# Patient Record
Sex: MALE | Race: WHITE | Employment: OTHER | URBAN - METROPOLITAN AREA
[De-identification: names, ages, dates, MRNs, and addresses within clinical notes are randomized per-mention and may not be internally consistent; named-entity substitution may affect disease eponyms.]

---

## 2017-01-12 ENCOUNTER — HOSPITAL ENCOUNTER (INPATIENT)
Age: 62
Setting detail: SURGERY ADMIT
End: 2017-01-12
Attending: ORTHOPAEDIC SURGERY | Admitting: ORTHOPAEDIC SURGERY

## 2017-02-13 ENCOUNTER — HOSPITAL ENCOUNTER (OUTPATIENT)
Dept: PREADMISSION TESTING | Age: 62
Discharge: HOME | End: 2017-02-13
Payer: COMMERCIAL

## 2017-02-13 VITALS
WEIGHT: 250 LBS | SYSTOLIC BLOOD PRESSURE: 129 MMHG | TEMPERATURE: 97.4 F | BODY MASS INDEX: 32.08 KG/M2 | HEART RATE: 70 BPM | DIASTOLIC BLOOD PRESSURE: 71 MMHG | HEIGHT: 74 IN

## 2017-02-13 LAB
ABO + RH BLD: NORMAL
ALBUMIN SERPL BCP-MCNC: 3.1 G/DL (ref 3.5–5)
ALBUMIN/GLOB SERPL: 0.8 {RATIO} (ref 1.1–2.2)
ALP SERPL-CCNC: 138 U/L (ref 45–117)
ALT SERPL-CCNC: 27 U/L (ref 12–78)
ANION GAP BLD CALC-SCNC: 10 MMOL/L (ref 5–15)
APPEARANCE UR: CLEAR
APTT PPP: 34.9 SEC (ref 22.1–32.5)
AST SERPL W P-5'-P-CCNC: 53 U/L (ref 15–37)
ATRIAL RATE: 66 BPM
BACTERIA URNS QL MICRO: NEGATIVE /HPF
BILIRUB SERPL-MCNC: 1 MG/DL (ref 0.2–1)
BILIRUB UR QL: NEGATIVE
BLOOD BANK CMNT PATIENT-IMP: NORMAL
BLOOD GROUP ANTIBODIES SERPL: NORMAL
BUN SERPL-MCNC: 26 MG/DL (ref 6–20)
BUN/CREAT SERPL: 11 (ref 12–20)
CALCIUM SERPL-MCNC: 8.2 MG/DL (ref 8.5–10.1)
CALCULATED P AXIS, ECG09: 29 DEGREES
CALCULATED R AXIS, ECG10: -37 DEGREES
CALCULATED T AXIS, ECG11: 10 DEGREES
CHLORIDE SERPL-SCNC: 107 MMOL/L (ref 97–108)
CO2 SERPL-SCNC: 21 MMOL/L (ref 21–32)
COLOR UR: NORMAL
CREAT SERPL-MCNC: 2.31 MG/DL (ref 0.7–1.3)
DIAGNOSIS, 93000: NORMAL
EPITH CASTS URNS QL MICRO: NORMAL /LPF
GLOBULIN SER CALC-MCNC: 3.9 G/DL (ref 2–4)
GLUCOSE SERPL-MCNC: 132 MG/DL (ref 65–100)
GLUCOSE UR STRIP.AUTO-MCNC: NEGATIVE MG/DL
HGB UR QL STRIP: NEGATIVE
HYALINE CASTS URNS QL MICRO: NORMAL /LPF (ref 0–5)
INR PPP: 1.6 (ref 0.9–1.1)
KETONES UR QL STRIP.AUTO: NEGATIVE MG/DL
LEUKOCYTE ESTERASE UR QL STRIP.AUTO: NEGATIVE
NITRITE UR QL STRIP.AUTO: NEGATIVE
P-R INTERVAL, ECG05: 236 MS
PH UR STRIP: 5 [PH] (ref 5–8)
POTASSIUM SERPL-SCNC: 4.6 MMOL/L (ref 3.5–5.1)
PROT SERPL-MCNC: 7 G/DL (ref 6.4–8.2)
PROT UR STRIP-MCNC: NEGATIVE MG/DL
PROTHROMBIN TIME: 16.8 SEC (ref 9–11.1)
Q-T INTERVAL, ECG07: 464 MS
QRS DURATION, ECG06: 112 MS
QTC CALCULATION (BEZET), ECG08: 486 MS
RBC #/AREA URNS HPF: NORMAL /HPF (ref 0–5)
SODIUM SERPL-SCNC: 138 MMOL/L (ref 136–145)
SP GR UR REFRACTOMETRY: 1.02 (ref 1–1.03)
SPECIMEN EXP DATE BLD: NORMAL
THERAPEUTIC RANGE,PTTT: ABNORMAL SECS (ref 58–77)
UA: UC IF INDICATED,UAUC: NORMAL
UROBILINOGEN UR QL STRIP.AUTO: 1 EU/DL (ref 0.2–1)
VENTRICULAR RATE, ECG03: 66 BPM
WBC URNS QL MICRO: NORMAL /HPF (ref 0–4)

## 2017-02-13 PROCEDURE — 85610 PROTHROMBIN TIME: CPT | Performed by: ORTHOPAEDIC SURGERY

## 2017-02-13 PROCEDURE — 85730 THROMBOPLASTIN TIME PARTIAL: CPT | Performed by: ORTHOPAEDIC SURGERY

## 2017-02-13 PROCEDURE — 86900 BLOOD TYPING SEROLOGIC ABO: CPT | Performed by: NURSE PRACTITIONER

## 2017-02-13 PROCEDURE — 81001 URINALYSIS AUTO W/SCOPE: CPT | Performed by: ORTHOPAEDIC SURGERY

## 2017-02-13 PROCEDURE — 80053 COMPREHEN METABOLIC PANEL: CPT | Performed by: ORTHOPAEDIC SURGERY

## 2017-02-13 PROCEDURE — 93005 ELECTROCARDIOGRAM TRACING: CPT

## 2017-02-13 PROCEDURE — 86870 RBC ANTIBODY IDENTIFICATION: CPT | Performed by: NURSE PRACTITIONER

## 2017-02-13 RX ORDER — METOPROLOL SUCCINATE 25 MG/1
25 TABLET, EXTENDED RELEASE ORAL
COMMUNITY
End: 2017-05-11

## 2017-02-13 NOTE — ADVANCED PRACTICE NURSE
Preoperative instructions reviewed with patient. Patient given 2-6 packs of CHG wipes. Instructions reviewed on use of CHG wipes. Patient given SSI infection FAQS sheet, as well as a  MRSA/MSSA treatment instruction sheet  With an explanation to patient that they will be notified if treatment instructions need to be initiated. Patient was given the opportunity to ask questions on the information provided. NOTICE OF SERVICES FOR DEAF AND HARD OF HEARING  FORM COMPLETED AND PLACED ON CHART.

## 2017-02-14 LAB
BACTERIA SPEC CULT: NORMAL
BACTERIA SPEC CULT: NORMAL
SERVICE CMNT-IMP: NORMAL

## 2017-02-14 RX ORDER — DILTIAZEM HYDROCHLORIDE 180 MG/1
180 CAPSULE, EXTENDED RELEASE ORAL DAILY
COMMUNITY
End: 2017-05-11

## 2017-02-14 RX ORDER — WARFARIN SODIUM 5 MG/1
5 TABLET ORAL DAILY
Status: ON HOLD | COMMUNITY
End: 2017-05-22

## 2017-02-14 NOTE — ADVANCED PRACTICE NURSE
Patient called to inform he needs to return to PAT 2/27 to have additional blood work completed (per Jcarlos from blood bank). Patient states he will return morning of 2/27, will call back to confirm time.

## 2017-02-14 NOTE — ADVANCED PRACTICE NURSE
Faxed PAT testing reports (and fax confirmation received) to Dr. Elizabeth Tapia office. Called at 0930 on 2/14/17(left message on Александр's voice mail) RE: abnormal EKG, BMP, PT/INR, CBC. Sending EKG to anesthesia for review with Nephrology notes and labs.     All labs and EKG faxed to patient PCP for preop exam.

## 2017-02-15 NOTE — ADVANCED PRACTICE NURSE
EKG returned from anesthesia review. OK to proceed without further orders per Dr. Yareli Keene, anesthesiologist. EKG faxed to Dr. Collins Bones office with confirmation of receipt.

## 2017-02-24 ENCOUNTER — HOSPITAL ENCOUNTER (OUTPATIENT)
Dept: INFUSION THERAPY | Age: 62
Discharge: HOME | End: 2017-02-24
Payer: COMMERCIAL

## 2017-02-24 VITALS
DIASTOLIC BLOOD PRESSURE: 61 MMHG | HEART RATE: 66 BPM | TEMPERATURE: 97.8 F | SYSTOLIC BLOOD PRESSURE: 109 MMHG | RESPIRATION RATE: 18 BRPM

## 2017-02-24 PROCEDURE — 86922 COMPATIBILITY TEST ANTIGLOB: CPT | Performed by: INTERNAL MEDICINE

## 2017-02-24 PROCEDURE — 86156 COLD AGGLUTININ SCREEN: CPT

## 2017-02-24 PROCEDURE — 86902 BLOOD TYPE ANTIGEN DONOR EA: CPT | Performed by: INTERNAL MEDICINE

## 2017-02-24 PROCEDURE — 86870 RBC ANTIBODY IDENTIFICATION: CPT | Performed by: INTERNAL MEDICINE

## 2017-02-24 PROCEDURE — 86880 COOMBS TEST DIRECT: CPT | Performed by: INTERNAL MEDICINE

## 2017-02-24 PROCEDURE — 86976 RBC SERUM PRETX ID DILUTION: CPT

## 2017-02-24 PROCEDURE — 86900 BLOOD TYPING SEROLOGIC ABO: CPT | Performed by: INTERNAL MEDICINE

## 2017-02-24 PROCEDURE — 86850 RBC ANTIBODY SCREEN: CPT

## 2017-02-24 PROCEDURE — 86870 RBC ANTIBODY IDENTIFICATION: CPT

## 2017-02-24 PROCEDURE — 86904 BLOOD TYPING PATIENT SERUM: CPT

## 2017-02-24 PROCEDURE — 86880 COOMBS TEST DIRECT: CPT

## 2017-02-24 PROCEDURE — 86920 COMPATIBILITY TEST SPIN: CPT | Performed by: INTERNAL MEDICINE

## 2017-02-24 PROCEDURE — 86921 COMPATIBILITY TEST INCUBATE: CPT | Performed by: INTERNAL MEDICINE

## 2017-02-24 PROCEDURE — 36415 COLL VENOUS BLD VENIPUNCTURE: CPT | Performed by: INTERNAL MEDICINE

## 2017-02-24 PROCEDURE — 86886 COOMBS TEST INDIRECT TITER: CPT

## 2017-02-24 RX ORDER — SODIUM CHLORIDE 9 MG/ML
250 INJECTION, SOLUTION INTRAVENOUS AS NEEDED
Status: DISCONTINUED | OUTPATIENT
Start: 2017-02-24 | End: 2017-02-28 | Stop reason: HOSPADM

## 2017-02-24 RX ORDER — SODIUM CHLORIDE 9 MG/ML
25 INJECTION, SOLUTION INTRAVENOUS CONTINUOUS
Status: DISCONTINUED | OUTPATIENT
Start: 2017-02-27 | End: 2017-03-03 | Stop reason: HOSPADM

## 2017-02-24 RX ORDER — DIPHENHYDRAMINE HCL 25 MG
25 CAPSULE ORAL ONCE
Status: COMPLETED | OUTPATIENT
Start: 2017-02-27 | End: 2017-02-27

## 2017-02-24 RX ORDER — ACETAMINOPHEN 325 MG/1
650 TABLET ORAL ONCE
Status: COMPLETED | OUTPATIENT
Start: 2017-02-27 | End: 2017-02-27

## 2017-02-24 RX ORDER — FUROSEMIDE 10 MG/ML
20 INJECTION INTRAMUSCULAR; INTRAVENOUS ONCE
Status: DISPENSED | OUTPATIENT
Start: 2017-02-27 | End: 2017-02-27

## 2017-02-24 NOTE — PROGRESS NOTES
[facility] short consult note:  65 Pt arrived to [facility] ambulatory and in no distress for Type and Cross. Denies any new complaints. Labs drawn x 3 tubes due to antibodies. /61  Pulse 66  Temp 97.8 °F (36.6 °C)  Resp 18  Medication given:  none  1600 Discharged home ambulatory and in no distress. Tolerated procedure well. Next appointment 2/27 for transfusion. Lab called after pt left requesting 5 total tubes. Lab called back stating that 3 full ones should be enough.

## 2017-02-27 ENCOUNTER — HOSPITAL ENCOUNTER (OUTPATIENT)
Dept: INFUSION THERAPY | Age: 62
Discharge: HOME | End: 2017-02-27
Payer: COMMERCIAL

## 2017-02-27 VITALS
DIASTOLIC BLOOD PRESSURE: 60 MMHG | RESPIRATION RATE: 18 BRPM | TEMPERATURE: 96.7 F | HEART RATE: 64 BPM | SYSTOLIC BLOOD PRESSURE: 115 MMHG

## 2017-02-27 LAB
ALBUMIN SERPL BCP-MCNC: 2.7 G/DL (ref 3.5–5)
ANION GAP BLD CALC-SCNC: 12 MMOL/L (ref 5–15)
BASOPHILS # BLD AUTO: 0.1 K/UL (ref 0–0.1)
BASOPHILS # BLD: 1 % (ref 0–1)
BUN SERPL-MCNC: 19 MG/DL (ref 6–20)
BUN/CREAT SERPL: 10 (ref 12–20)
CALCIUM SERPL-MCNC: 8.1 MG/DL (ref 8.5–10.1)
CHLORIDE SERPL-SCNC: 111 MMOL/L (ref 97–108)
CO2 SERPL-SCNC: 20 MMOL/L (ref 21–32)
CREAT SERPL-MCNC: 1.81 MG/DL (ref 0.7–1.3)
EOSINOPHIL # BLD: 0.1 K/UL (ref 0–0.4)
EOSINOPHIL NFR BLD: 4 % (ref 0–7)
ERYTHROCYTE [DISTWIDTH] IN BLOOD BY AUTOMATED COUNT: 16.5 % (ref 11.5–14.5)
GLUCOSE SERPL-MCNC: 112 MG/DL (ref 65–100)
HCT VFR BLD AUTO: 22 % (ref 36.6–50.3)
HGB BLD-MCNC: 7.6 G/DL (ref 12.1–17)
LYMPHOCYTES # BLD AUTO: 22 % (ref 12–49)
LYMPHOCYTES # BLD: 0.8 K/UL (ref 0.8–3.5)
MCH RBC QN AUTO: 30.5 PG (ref 26–34)
MCHC RBC AUTO-ENTMCNC: 34.5 G/DL (ref 30–36.5)
MCV RBC AUTO: 88.4 FL (ref 80–99)
MONOCYTES # BLD: 0.5 K/UL (ref 0–1)
MONOCYTES NFR BLD AUTO: 14 % (ref 5–13)
NEUTS SEG # BLD: 2.2 K/UL (ref 1.8–8)
NEUTS SEG NFR BLD AUTO: 59 % (ref 32–75)
PHOSPHATE SERPL-MCNC: 3 MG/DL (ref 2.6–4.7)
PLATELET # BLD AUTO: 91 K/UL (ref 150–400)
POTASSIUM SERPL-SCNC: 4 MMOL/L (ref 3.5–5.1)
RBC # BLD AUTO: 2.49 M/UL (ref 4.1–5.7)
SODIUM SERPL-SCNC: 143 MMOL/L (ref 136–145)
WBC # BLD AUTO: 3.8 K/UL (ref 4.1–11.1)

## 2017-02-27 PROCEDURE — 96374 THER/PROPH/DIAG INJ IV PUSH: CPT

## 2017-02-27 PROCEDURE — 74011250636 HC RX REV CODE- 250/636: Performed by: INTERNAL MEDICINE

## 2017-02-27 PROCEDURE — 85025 COMPLETE CBC W/AUTO DIFF WBC: CPT | Performed by: INTERNAL MEDICINE

## 2017-02-27 PROCEDURE — 80069 RENAL FUNCTION PANEL: CPT | Performed by: INTERNAL MEDICINE

## 2017-02-27 PROCEDURE — 96375 TX/PRO/DX INJ NEW DRUG ADDON: CPT

## 2017-02-27 PROCEDURE — 74011250637 HC RX REV CODE- 250/637: Performed by: INTERNAL MEDICINE

## 2017-02-27 PROCEDURE — 77030013169 SET IV BLD ICUM -A

## 2017-02-27 PROCEDURE — 36430 TRANSFUSION BLD/BLD COMPNT: CPT

## 2017-02-27 PROCEDURE — 36415 COLL VENOUS BLD VENIPUNCTURE: CPT | Performed by: INTERNAL MEDICINE

## 2017-02-27 PROCEDURE — P9016 RBC LEUKOCYTES REDUCED: HCPCS | Performed by: INTERNAL MEDICINE

## 2017-02-27 RX ORDER — SODIUM CHLORIDE 0.9 % (FLUSH) 0.9 %
10-40 SYRINGE (ML) INJECTION AS NEEDED
Status: ACTIVE | OUTPATIENT
Start: 2017-02-27 | End: 2017-02-28

## 2017-02-27 RX ORDER — FUROSEMIDE 10 MG/ML
20 INJECTION INTRAMUSCULAR; INTRAVENOUS ONCE
Status: COMPLETED | OUTPATIENT
Start: 2017-02-27 | End: 2017-02-27

## 2017-02-27 RX ORDER — SODIUM CHLORIDE 9 MG/ML
250 INJECTION, SOLUTION INTRAVENOUS AS NEEDED
Status: DISCONTINUED | OUTPATIENT
Start: 2017-02-27 | End: 2017-03-03 | Stop reason: HOSPADM

## 2017-02-27 RX ORDER — SODIUM CHLORIDE 9 MG/ML
250 INJECTION, SOLUTION INTRAVENOUS AS NEEDED
Status: DISPENSED | OUTPATIENT
Start: 2017-02-27 | End: 2017-02-28

## 2017-02-27 RX ADMIN — Medication 10 ML: at 14:50

## 2017-02-27 RX ADMIN — ACETAMINOPHEN 650 MG: 325 TABLET ORAL at 11:24

## 2017-02-27 RX ADMIN — SODIUM CHLORIDE 25 ML/HR: 900 INJECTION, SOLUTION INTRAVENOUS at 12:05

## 2017-02-27 RX ADMIN — DIPHENHYDRAMINE HYDROCHLORIDE 25 MG: 25 CAPSULE ORAL at 11:24

## 2017-02-27 RX ADMIN — FUROSEMIDE 20 MG: 10 INJECTION, SOLUTION INTRAMUSCULAR; INTRAVENOUS at 14:29

## 2017-02-27 NOTE — PROGRESS NOTES
1115 Pt arrived at [facility] ambulatory and in no distress for transfusion of 1 unit PRBCs. Assessment completed, no new complaints voiced. IV established in R forearm without difficulty. Signs/symptoms of adverse blood reaction discussed with pt, voiced understanding. Patient Vitals for the past 12 hrs:   Temp Pulse Resp BP   02/27/17 1448 - 64 18 115/60   02/27/17 1310 96.7 °F (35.9 °C) 64 18 112/61   02/27/17 1240 96.8 °F (36 °C) - 18 114/57   02/27/17 1225 97.3 °F (36.3 °C) 61 18 116/61   02/27/17 1201 96.8 °F (36 °C) 63 18 118/58     Medications received:   NS @ KVO   Tylenol 650 mg po   Benadryl 25 mg IV   1210: 1st unit PRBCs started and infusing without difficulty, will monitor. 1405: 1st unit completed without adverse reaction noted, NS flushing line. Lasix 20mg IVP    Labs obtained: CBC, Renal Panel    Recent Results (from the past 12 hour(s))   CBC WITH AUTOMATED DIFF    Collection Time: 02/27/17  2:42 PM   Result Value Ref Range    WBC 3.8 (L) 4.1 - 11.1 K/uL    RBC 2.49 (L) 4.10 - 5.70 M/uL    HGB 7.6 (L) 12.1 - 17.0 g/dL    HCT 22.0 (L) 36.6 - 50.3 %    MCV 88.4 80.0 - 99.0 FL    MCH 30.5 26.0 - 34.0 PG    MCHC 34.5 30.0 - 36.5 g/dL    RDW 16.5 (H) 11.5 - 14.5 %    PLATELET 91 (L) 544 - 400 K/uL    NEUTROPHILS 59 32 - 75 %    LYMPHOCYTES 22 12 - 49 %    MONOCYTES 14 (H) 5 - 13 %    EOSINOPHILS 4 0 - 7 %    BASOPHILS 1 0 - 1 %    ABS. NEUTROPHILS 2.2 1.8 - 8.0 K/UL    ABS. LYMPHOCYTES 0.8 0.8 - 3.5 K/UL    ABS. MONOCYTES 0.5 0.0 - 1.0 K/UL    ABS. EOSINOPHILS 0.1 0.0 - 0.4 K/UL    ABS.  BASOPHILS 0.1 0.0 - 0.1 K/UL   RENAL FUNCTION PANEL    Collection Time: 02/27/17  2:42 PM   Result Value Ref Range    Sodium 143 136 - 145 mmol/L    Potassium 4.0 3.5 - 5.1 mmol/L    Chloride 111 (H) 97 - 108 mmol/L    CO2 20 (L) 21 - 32 mmol/L    Anion gap 12 5 - 15 mmol/L    Glucose 112 (H) 65 - 100 mg/dL    BUN 19 6 - 20 MG/DL    Creatinine 1.81 (H) 0.70 - 1.30 MG/DL    BUN/Creatinine ratio 10 (L) 12 - 20      GFR est AA 46 (L) >60 ml/min/1.73m2    GFR est non-AA 38 (L) >60 ml/min/1.73m2    Calcium 8.1 (L) 8.5 - 10.1 MG/DL    Phosphorus 3.0 2.6 - 4.7 MG/DL    Albumin 2.7 (L) 3.5 - 5.0 g/dL     Tolerated transfusion well, no adverse reaction noted. D/C instructions reviewed, copy to pt, voiced understanding. Patient declined 1 hour post transfusion observation. D/Cd from [address] and in no distress accompanied by self. No further appointments.

## 2017-02-27 NOTE — DISCHARGE INSTRUCTIONS
OUTPATIENT INFUSION CENTER    DISCHARGE INSTRUCTIONS FOR:  BLOOD TRANSFUSION    We hope you are feeling better after your blood transfusion. Some mild tenderness or slight bruising at your IV site is normal.  Avoid lifting or heavy use of that extremity for the rest of the day. Drink plenty of fluids, eat a normal diet and get some rest.    There are some important signs that you need to watch for in case you experience a delayed reaction to the blood you have received. Call your physician immediately if you develop any of the following symptoms:    1. Severe headache or backache;    2. Fever above 100 degrees;    3. Chills;    4. Difficulty breathing;    5.  Blood or red color in urine;    6. The feeling of weakness or constant fatigue;    7. Yellowing of the whites of your eyes or skin (jaundice). If your physician is not available, call or go to the nearest emergency room, or dial 911. Paty Dussraine Whaley III, Signature: ___________________________ 2/27/2017  Cindy Machado RN OUTPATIENT INFUSION CENTER    DISCHARGE INSTRUCTIONS FOR:  BLOOD TRANSFUSION    We hope you are feeling better after your blood transfusion. Some mild tenderness or slight bruising at your IV site is normal.  Avoid lifting or heavy use of that extremity for the rest of the day. Drink plenty of fluids, eat a normal diet and get some rest.    There are some important signs that you need to watch for in case you experience a delayed reaction to the blood you have received. Call your physician immediately if you develop any of the following symptoms:    1. Severe headache or backache;    2. Fever above 100 degrees;    3. Chills;    4. Difficulty breathing;    5.  Blood or red color in urine;    6. The feeling of weakness or constant fatigue;    7. Yellowing of the whites of your eyes or skin (jaundice). If your physician is not available, call or go to the nearest emergency room, or dial 911.     Kofi Nguyen III, Signature: ___________________________ 2/27/2017

## 2017-02-28 LAB
ABO + RH BLD: NORMAL
ANTI-COMPLEMENT (C3B,C3D): NORMAL
ANTIGENS PRESENT RBC DONR: NORMAL
BLD PROD TYP BPU: NORMAL
BLOOD BANK CMNT PATIENT-IMP: NORMAL
BLOOD GROUP ANTIBODIES SERPL: NORMAL
BLOOD GROUP ANTIBODIES SERPL: NORMAL
BPU ID: NORMAL
CROSSMATCH RESULT,%XM: NORMAL
DAT IGG-SP REAG RBC QL: NORMAL
DAT POLY-SP REAG RBC QL: NORMAL
PHYSICIAN INSTRUCTIO,%PI: NORMAL
SPECIMEN EXP DATE BLD: NORMAL
STATUS OF UNIT,%ST: NORMAL
UNIT DIVISION, %UDIV: 0

## 2017-03-03 ENCOUNTER — HOSPITAL ENCOUNTER (OUTPATIENT)
Dept: INFUSION THERAPY | Age: 62
Discharge: HOME | End: 2017-03-03
Payer: COMMERCIAL

## 2017-03-03 VITALS
TEMPERATURE: 97.1 F | SYSTOLIC BLOOD PRESSURE: 121 MMHG | HEART RATE: 69 BPM | RESPIRATION RATE: 18 BRPM | DIASTOLIC BLOOD PRESSURE: 63 MMHG | OXYGEN SATURATION: 99 %

## 2017-03-03 LAB
ALBUMIN SERPL BCP-MCNC: 2.7 G/DL (ref 3.5–5)
ANION GAP BLD CALC-SCNC: 10 MMOL/L (ref 5–15)
BUN SERPL-MCNC: 18 MG/DL (ref 6–20)
BUN/CREAT SERPL: 10 (ref 12–20)
CALCIUM SERPL-MCNC: 8.3 MG/DL (ref 8.5–10.1)
CALCIUM SERPL-MCNC: 8.4 MG/DL (ref 8.5–10.1)
CHLORIDE SERPL-SCNC: 106 MMOL/L (ref 97–108)
CO2 SERPL-SCNC: 21 MMOL/L (ref 21–32)
CREAT SERPL-MCNC: 1.78 MG/DL (ref 0.7–1.3)
FERRITIN SERPL-MCNC: 37 NG/ML (ref 26–388)
GLUCOSE SERPL-MCNC: 233 MG/DL (ref 65–100)
HCT VFR BLD AUTO: 25.3 % (ref 36.6–50.3)
HGB BLD-MCNC: 8.1 G/DL (ref 12.1–17)
IRON SATN MFR SERPL: 20 % (ref 20–50)
IRON SERPL-MCNC: 70 UG/DL (ref 35–150)
PHOSPHATE SERPL-MCNC: 2.6 MG/DL (ref 2.6–4.7)
POTASSIUM SERPL-SCNC: 3.4 MMOL/L (ref 3.5–5.1)
PTH-INTACT SERPL-MCNC: 71 PG/ML (ref 14–72)
SODIUM SERPL-SCNC: 137 MMOL/L (ref 136–145)
TIBC SERPL-MCNC: 352 UG/DL (ref 250–450)

## 2017-03-03 PROCEDURE — 36415 COLL VENOUS BLD VENIPUNCTURE: CPT | Performed by: INTERNAL MEDICINE

## 2017-03-03 PROCEDURE — 85018 HEMOGLOBIN: CPT | Performed by: INTERNAL MEDICINE

## 2017-03-03 PROCEDURE — 83970 ASSAY OF PARATHORMONE: CPT | Performed by: INTERNAL MEDICINE

## 2017-03-03 PROCEDURE — 82728 ASSAY OF FERRITIN: CPT | Performed by: INTERNAL MEDICINE

## 2017-03-03 PROCEDURE — 83540 ASSAY OF IRON: CPT | Performed by: INTERNAL MEDICINE

## 2017-03-03 PROCEDURE — 96372 THER/PROPH/DIAG INJ SC/IM: CPT

## 2017-03-03 PROCEDURE — 80069 RENAL FUNCTION PANEL: CPT | Performed by: INTERNAL MEDICINE

## 2017-03-03 PROCEDURE — 74011250636 HC RX REV CODE- 250/636: Performed by: INTERNAL MEDICINE

## 2017-03-03 RX ADMIN — ERYTHROPOIETIN 20000 UNITS: 20000 INJECTION, SOLUTION INTRAVENOUS; SUBCUTANEOUS at 17:10

## 2017-03-03 NOTE — PROGRESS NOTES
Outpatient Infusion Center Short Visit Progress Note    1640 Pt admit to [facility] for Procrit ambulatory with walker in stable condition. Assessment completed. No new concerns voiced. Labs drawn peripherally per order and send for processing. Please review pending lab results in [address] [place] [address]. Visit Vitals    /63    Pulse 69    Temp 97.1 °F (36.2 °C)    Resp 18    SpO2 99%       Recent Results (from the past 12 hour(s))   HGB & HCT    Collection Time: 03/03/17  4:43 PM   Result Value Ref Range    HGB 8.1 (L) 12.1 - 17.0 g/dL    HCT 25.3 (L) 36.6 - 50.3 %   RENAL FUNCTION PANEL    Collection Time: 03/03/17  4:43 PM   Result Value Ref Range    Sodium 137 136 - 145 mmol/L    Potassium 3.4 (L) 3.5 - 5.1 mmol/L    Chloride 106 97 - 108 mmol/L    CO2 21 21 - 32 mmol/L    Anion gap 10 5 - 15 mmol/L    Glucose 233 (H) 65 - 100 mg/dL    BUN 18 6 - 20 MG/DL    Creatinine 1.78 (H) 0.70 - 1.30 MG/DL    BUN/Creatinine ratio 10 (L) 12 - 20      GFR est AA 47 (L) >60 ml/min/1.73m2    GFR est non-AA 39 (L) >60 ml/min/1.73m2    Calcium 8.4 (L) 8.5 - 10.1 MG/DL    Phosphorus 2.6 2.6 - 4.7 MG/DL    Albumin 2.7 (L) 3.5 - 5.0 g/dL   FERRITIN    Collection Time: 03/03/17  4:43 PM   Result Value Ref Range    Ferritin 37 26 - 388 NG/ML   IRON PROFILE    Collection Time: 03/03/17  4:43 PM   Result Value Ref Range    Iron 70 35 - 150 ug/dL    TIBC 352 250 - 450 ug/dL    Iron % saturation 20 20 - 50 %   PTH INTACT    Collection Time: 03/03/17  4:43 PM   Result Value Ref Range    Calcium 8.3 (L) 8.5 - 10.1 MG/DL    PTH, Intact 71.0 14.0 - 72.0 pg/mL         Medications:  Procrit SQ Right Arm    1715 Pt tolerated treatment well. D/c home ambulatory with walker in no distress. Pt aware of next appointment scheduled for 3/9/17.

## 2017-03-06 ENCOUNTER — APPOINTMENT (OUTPATIENT)
Dept: INFUSION THERAPY | Age: 62
End: 2017-03-06
Payer: COMMERCIAL

## 2017-03-09 ENCOUNTER — HOSPITAL ENCOUNTER (OUTPATIENT)
Dept: ULTRASOUND IMAGING | Age: 62
Discharge: HOME | End: 2017-03-09
Attending: INTERNAL MEDICINE
Payer: COMMERCIAL

## 2017-03-09 ENCOUNTER — HOSPITAL ENCOUNTER (OUTPATIENT)
Dept: INFUSION THERAPY | Age: 62
Discharge: HOME | End: 2017-03-09
Payer: COMMERCIAL

## 2017-03-09 VITALS
DIASTOLIC BLOOD PRESSURE: 62 MMHG | TEMPERATURE: 97.2 F | SYSTOLIC BLOOD PRESSURE: 120 MMHG | OXYGEN SATURATION: 99 % | HEART RATE: 72 BPM | RESPIRATION RATE: 18 BRPM

## 2017-03-09 DIAGNOSIS — N18.30 CHRONIC KIDNEY DISEASE, STAGE III (MODERATE) (HCC): ICD-10-CM

## 2017-03-09 PROCEDURE — 36415 COLL VENOUS BLD VENIPUNCTURE: CPT | Performed by: INTERNAL MEDICINE

## 2017-03-09 PROCEDURE — 86880 COOMBS TEST DIRECT: CPT | Performed by: INTERNAL MEDICINE

## 2017-03-09 PROCEDURE — 86870 RBC ANTIBODY IDENTIFICATION: CPT

## 2017-03-09 PROCEDURE — 86860 RBC ANTIBODY ELUTION: CPT

## 2017-03-09 PROCEDURE — 86920 COMPATIBILITY TEST SPIN: CPT | Performed by: INTERNAL MEDICINE

## 2017-03-09 PROCEDURE — 86885 COOMBS TEST INDIRECT QUAL: CPT

## 2017-03-09 PROCEDURE — 86904 BLOOD TYPING PATIENT SERUM: CPT

## 2017-03-09 PROCEDURE — 86850 RBC ANTIBODY SCREEN: CPT | Performed by: INTERNAL MEDICINE

## 2017-03-09 PROCEDURE — 76770 US EXAM ABDO BACK WALL COMP: CPT

## 2017-03-09 PROCEDURE — 86880 COOMBS TEST DIRECT: CPT

## 2017-03-09 PROCEDURE — 86860 RBC ANTIBODY ELUTION: CPT | Performed by: INTERNAL MEDICINE

## 2017-03-09 PROCEDURE — 86870 RBC ANTIBODY IDENTIFICATION: CPT | Performed by: INTERNAL MEDICINE

## 2017-03-09 PROCEDURE — 86850 RBC ANTIBODY SCREEN: CPT

## 2017-03-09 RX ORDER — DIPHENHYDRAMINE HCL 25 MG
25 CAPSULE ORAL ONCE
Status: DISCONTINUED | OUTPATIENT
Start: 2017-03-10 | End: 2017-03-09

## 2017-03-09 RX ORDER — DIPHENHYDRAMINE HCL 25 MG
25 CAPSULE ORAL ONCE
Status: COMPLETED | OUTPATIENT
Start: 2017-03-10 | End: 2017-03-10

## 2017-03-09 RX ORDER — FUROSEMIDE 10 MG/ML
20 INJECTION INTRAMUSCULAR; INTRAVENOUS ONCE
Status: COMPLETED | OUTPATIENT
Start: 2017-03-10 | End: 2017-03-10

## 2017-03-09 RX ORDER — SODIUM CHLORIDE 9 MG/ML
25 INJECTION, SOLUTION INTRAVENOUS CONTINUOUS
Status: DISPENSED | OUTPATIENT
Start: 2017-03-10 | End: 2017-03-10

## 2017-03-09 RX ORDER — SODIUM CHLORIDE 9 MG/ML
25 INJECTION, SOLUTION INTRAVENOUS CONTINUOUS
Status: DISCONTINUED | OUTPATIENT
Start: 2017-03-10 | End: 2017-03-09

## 2017-03-09 RX ORDER — ACETAMINOPHEN 325 MG/1
650 TABLET ORAL ONCE
Status: DISCONTINUED | OUTPATIENT
Start: 2017-03-10 | End: 2017-03-09

## 2017-03-09 RX ORDER — ACETAMINOPHEN 325 MG/1
650 TABLET ORAL ONCE
Status: COMPLETED | OUTPATIENT
Start: 2017-03-10 | End: 2017-03-10

## 2017-03-09 RX ORDER — FUROSEMIDE 10 MG/ML
20 INJECTION INTRAMUSCULAR; INTRAVENOUS ONCE
Status: DISCONTINUED | OUTPATIENT
Start: 2017-03-10 | End: 2017-03-09

## 2017-03-09 NOTE — PROGRESS NOTES
[facility]    1430 Patient arrived ambulatory for type and cross without any acute problems, labs drawn peripherally by Dylan Payan phlebotomist      Patient discharged ambulatory without incident. Patient is aware aware of next Health system appointment on 3/9/2017.      Vital Signs:   Patient Vitals for the past 12 hrs:   Temp Pulse Resp BP SpO2   03/09/17 1434 97.2 °F (36.2 °C) 72 18 120/62 99 %       Lab Results: See connect care

## 2017-03-10 ENCOUNTER — HOSPITAL ENCOUNTER (OUTPATIENT)
Dept: INFUSION THERAPY | Age: 62
Discharge: HOME | End: 2017-03-10
Payer: COMMERCIAL

## 2017-03-10 VITALS
DIASTOLIC BLOOD PRESSURE: 61 MMHG | OXYGEN SATURATION: 98 % | SYSTOLIC BLOOD PRESSURE: 126 MMHG | RESPIRATION RATE: 20 BRPM | TEMPERATURE: 98 F | HEART RATE: 73 BPM

## 2017-03-10 LAB
ALBUMIN SERPL BCP-MCNC: 2.7 G/DL (ref 3.5–5)
ANION GAP BLD CALC-SCNC: 9 MMOL/L (ref 5–15)
BUN SERPL-MCNC: 20 MG/DL (ref 6–20)
BUN/CREAT SERPL: 11 (ref 12–20)
CALCIUM SERPL-MCNC: 8.2 MG/DL (ref 8.5–10.1)
CHLORIDE SERPL-SCNC: 108 MMOL/L (ref 97–108)
CO2 SERPL-SCNC: 25 MMOL/L (ref 21–32)
CREAT SERPL-MCNC: 1.88 MG/DL (ref 0.7–1.3)
ERYTHROCYTE [DISTWIDTH] IN BLOOD BY AUTOMATED COUNT: 15.6 % (ref 11.5–14.5)
GLUCOSE SERPL-MCNC: 112 MG/DL (ref 65–100)
HCT VFR BLD AUTO: 24.3 % (ref 36.6–50.3)
HGB BLD-MCNC: 7.8 G/DL (ref 12.1–17)
MCH RBC QN AUTO: 28.5 PG (ref 26–34)
MCHC RBC AUTO-ENTMCNC: 32.1 G/DL (ref 30–36.5)
MCV RBC AUTO: 88.7 FL (ref 80–99)
PHOSPHATE SERPL-MCNC: 3.2 MG/DL (ref 2.6–4.7)
PLATELET # BLD AUTO: 103 K/UL (ref 150–400)
POTASSIUM SERPL-SCNC: 3.5 MMOL/L (ref 3.5–5.1)
RBC # BLD AUTO: 2.74 M/UL (ref 4.1–5.7)
SODIUM SERPL-SCNC: 142 MMOL/L (ref 136–145)
WBC # BLD AUTO: 3.9 K/UL (ref 4.1–11.1)

## 2017-03-10 PROCEDURE — 86921 COMPATIBILITY TEST INCUBATE: CPT | Performed by: INTERNAL MEDICINE

## 2017-03-10 PROCEDURE — 85027 COMPLETE CBC AUTOMATED: CPT | Performed by: INTERNAL MEDICINE

## 2017-03-10 PROCEDURE — P9016 RBC LEUKOCYTES REDUCED: HCPCS | Performed by: INTERNAL MEDICINE

## 2017-03-10 PROCEDURE — 86922 COMPATIBILITY TEST ANTIGLOB: CPT | Performed by: INTERNAL MEDICINE

## 2017-03-10 PROCEDURE — 96372 THER/PROPH/DIAG INJ SC/IM: CPT

## 2017-03-10 PROCEDURE — 96374 THER/PROPH/DIAG INJ IV PUSH: CPT

## 2017-03-10 PROCEDURE — 36415 COLL VENOUS BLD VENIPUNCTURE: CPT | Performed by: INTERNAL MEDICINE

## 2017-03-10 PROCEDURE — 36430 TRANSFUSION BLD/BLD COMPNT: CPT

## 2017-03-10 PROCEDURE — 74011250636 HC RX REV CODE- 250/636: Performed by: INTERNAL MEDICINE

## 2017-03-10 PROCEDURE — 80069 RENAL FUNCTION PANEL: CPT | Performed by: INTERNAL MEDICINE

## 2017-03-10 PROCEDURE — 74011250637 HC RX REV CODE- 250/637: Performed by: INTERNAL MEDICINE

## 2017-03-10 RX ORDER — SODIUM CHLORIDE 0.9 % (FLUSH) 0.9 %
5-10 SYRINGE (ML) INJECTION EVERY 8 HOURS
Status: DISCONTINUED | OUTPATIENT
Start: 2017-03-10 | End: 2017-03-14 | Stop reason: HOSPADM

## 2017-03-10 RX ORDER — SODIUM CHLORIDE 9 MG/ML
250 INJECTION, SOLUTION INTRAVENOUS AS NEEDED
Status: DISCONTINUED | OUTPATIENT
Start: 2017-03-10 | End: 2017-03-14 | Stop reason: HOSPADM

## 2017-03-10 RX ORDER — HEPARIN 100 UNIT/ML
500 SYRINGE INTRAVENOUS AS NEEDED
Status: ACTIVE | OUTPATIENT
Start: 2017-03-10 | End: 2017-03-11

## 2017-03-10 RX ADMIN — SODIUM CHLORIDE 25 ML/HR: 900 INJECTION, SOLUTION INTRAVENOUS at 10:28

## 2017-03-10 RX ADMIN — ACETAMINOPHEN 650 MG: 325 TABLET ORAL at 10:17

## 2017-03-10 RX ADMIN — ERYTHROPOIETIN 20000 UNITS: 20000 INJECTION, SOLUTION INTRAVENOUS; SUBCUTANEOUS at 14:11

## 2017-03-10 RX ADMIN — FUROSEMIDE 20 MG: 10 INJECTION, SOLUTION INTRAVENOUS at 13:18

## 2017-03-10 RX ADMIN — DIPHENHYDRAMINE HYDROCHLORIDE 25 MG: 25 CAPSULE ORAL at 10:17

## 2017-03-10 NOTE — DISCHARGE INSTRUCTIONS
OUTPATIENT INFUSION CENTER    DISCHARGE INSTRUCTIONS FOR:  BLOOD TRANSFUSION    We hope you are feeling better after your blood transfusion. Some mild tenderness or slight bruising at your IV site is normal.  Avoid lifting or heavy use of that extremity for the rest of the day. Drink plenty of fluids, eat a normal diet and get some rest.    There are some important signs that you need to watch for in case you experience a delayed reaction to the blood you have received. Call your physician immediately if you develop any of the following symptoms:    1. Severe headache or backache;    2. Fever above 100 degrees;    3. Chills;    4. Difficulty breathing;    5.  Blood or red color in urine;    6. The feeling of weakness or constant fatigue;    7. Yellowing of the whites of your eyes or skin (jaundice). If your physician is not available, call or go to the nearest emergency room, or dial 911.     Maurene Oppenheim III, Signature: ___________________________ 3/10/2017  Ricarda Robbins RN

## 2017-03-10 NOTE — PROGRESS NOTES
1000: Pt arrived at [facility] ambulatory and in no distress for transfusion of 1 unit PRBCs and week 2 of 8 Procrit injections. Assessment completed, no new complaints voiced. IV established in RAC without difficulty. Signs/symptoms of adverse blood reaction discussed with pt, voiced understanding. Medications received:  NS @ KVO  Tylenol 650 mg po   Benadryl 25 mg IV  Lasix 20 mg IV  Procrit 20,000 units    1100:  1st unit PRBCs started and infusing without difficulty, will monitor. 1305:  1st unit completed without adverse reaction noted, NS flushing line. Recent Results (from the past 24 hour(s))   CBC W/O DIFF    Collection Time: 03/10/17  1:47 PM   Result Value Ref Range    WBC 3.9 (L) 4.1 - 11.1 K/uL    RBC 2.74 (L) 4.10 - 5.70 M/uL    HGB 7.8 (L) 12.1 - 17.0 g/dL    HCT 24.3 (L) 36.6 - 50.3 %    MCV 88.7 80.0 - 99.0 FL    MCH 28.5 26.0 - 34.0 PG    MCHC 32.1 30.0 - 36.5 g/dL    RDW 15.6 (H) 11.5 - 14.5 %    PLATELET 161 (L) 225 - 400 K/uL   RENAL FUNCTION PANEL    Collection Time: 03/10/17  1:47 PM   Result Value Ref Range    Sodium 142 136 - 145 mmol/L    Potassium 3.5 3.5 - 5.1 mmol/L    Chloride 108 97 - 108 mmol/L    CO2 25 21 - 32 mmol/L    Anion gap 9 5 - 15 mmol/L    Glucose 112 (H) 65 - 100 mg/dL    BUN 20 6 - 20 MG/DL    Creatinine 1.88 (H) 0.70 - 1.30 MG/DL    BUN/Creatinine ratio 11 (L) 12 - 20      GFR est AA 44 (L) >60 ml/min/1.73m2    GFR est non-AA 36 (L) >60 ml/min/1.73m2    Calcium 8.2 (L) 8.5 - 10.1 MG/DL    Phosphorus 3.2 2.6 - 4.7 MG/DL    Albumin 2.7 (L) 3.5 - 5.0 g/dL     1420:  hgb noted to be 7.8 post transfusion, notified MD office, spoke with Jose Melton, no orders obtained at this time. 1430: Tolerated transfusion  well, no adverse reaction noted. D/C instructions reviewed, copy to pt, voiced understanding. D/Cd from [facility] ambulatory and in no distress accompanied by spouse. Next appt 3/17.

## 2017-03-11 LAB
ABO + RH BLD: NORMAL
ANTI-COMPLEMENT (C3B,C3D): NORMAL
BLD GP AB SCN SERPL QL ELUTION: NORMAL
BLD PROD TYP BPU: NORMAL
BLOOD BANK CMNT PATIENT-IMP: NORMAL
BLOOD GROUP ANTIBODIES SERPL: NORMAL
BLOOD GROUP ANTIBODIES SERPL: NORMAL
BPU ID: NORMAL
CROSSMATCH RESULT,%XM: NORMAL
DAT IGG-SP REAG RBC QL: NORMAL
DAT POLY-SP REAG RBC QL: NORMAL
SPECIMEN EXP DATE BLD: NORMAL
STATUS OF UNIT,%ST: NORMAL
UNIT DIVISION, %UDIV: 0

## 2017-03-17 ENCOUNTER — HOSPITAL ENCOUNTER (OUTPATIENT)
Dept: INFUSION THERAPY | Age: 62
Discharge: HOME | End: 2017-03-17
Payer: COMMERCIAL

## 2017-03-17 VITALS
HEART RATE: 64 BPM | SYSTOLIC BLOOD PRESSURE: 120 MMHG | RESPIRATION RATE: 20 BRPM | DIASTOLIC BLOOD PRESSURE: 61 MMHG | TEMPERATURE: 96.8 F

## 2017-03-17 LAB
ALBUMIN SERPL BCP-MCNC: 2.9 G/DL (ref 3.5–5)
ANION GAP BLD CALC-SCNC: 11 MMOL/L (ref 5–15)
BUN SERPL-MCNC: 22 MG/DL (ref 6–20)
BUN/CREAT SERPL: 12 (ref 12–20)
CALCIUM SERPL-MCNC: 8.7 MG/DL (ref 8.5–10.1)
CHLORIDE SERPL-SCNC: 105 MMOL/L (ref 97–108)
CO2 SERPL-SCNC: 22 MMOL/L (ref 21–32)
CREAT SERPL-MCNC: 1.81 MG/DL (ref 0.7–1.3)
GLUCOSE SERPL-MCNC: 121 MG/DL (ref 65–100)
HCT VFR BLD AUTO: 27.1 % (ref 36.6–50.3)
HGB BLD-MCNC: 8.7 G/DL (ref 12.1–17)
PHOSPHATE SERPL-MCNC: 2.8 MG/DL (ref 2.6–4.7)
POTASSIUM SERPL-SCNC: 3.3 MMOL/L (ref 3.5–5.1)
SODIUM SERPL-SCNC: 138 MMOL/L (ref 136–145)

## 2017-03-17 PROCEDURE — 80069 RENAL FUNCTION PANEL: CPT | Performed by: INTERNAL MEDICINE

## 2017-03-17 PROCEDURE — 85018 HEMOGLOBIN: CPT | Performed by: INTERNAL MEDICINE

## 2017-03-17 PROCEDURE — 36415 COLL VENOUS BLD VENIPUNCTURE: CPT | Performed by: INTERNAL MEDICINE

## 2017-03-17 PROCEDURE — 96372 THER/PROPH/DIAG INJ SC/IM: CPT

## 2017-03-17 PROCEDURE — 74011250636 HC RX REV CODE- 250/636: Performed by: INTERNAL MEDICINE

## 2017-03-17 RX ADMIN — ERYTHROPOIETIN 40000 UNITS: 40000 INJECTION, SOLUTION INTRAVENOUS; SUBCUTANEOUS at 16:42

## 2017-03-17 NOTE — PROGRESS NOTES
Outpatient Infusion Center Short Visit Progress Note    1600 Pt admit to [facility] for labs and possible Procrit injection ambulatory in stable condition. Assessment completed. No new concerns voiced. Visit Vitals    /61 (BP 1 Location: Right arm, BP Patient Position: Sitting)    Pulse 64    Temp 96.8 °F (36 °C)    Resp 20       Labs drawn peripherally and sent for processing. Medications:  Procrit SQ    1645 Pt tolerated treatment well. D/c home ambulatory in no distress. Pt aware of next appointment scheduled for 3/24/17 at 4:30 for weekly labs and possible Procrit injection.     Recent Results (from the past 12 hour(s))   HGB & HCT    Collection Time: 03/17/17  4:06 PM   Result Value Ref Range    HGB 8.7 (L) 12.1 - 17.0 g/dL    HCT 27.1 (L) 36.6 - 50.3 %   RENAL FUNCTION PANEL    Collection Time: 03/17/17  4:06 PM   Result Value Ref Range    Sodium 138 136 - 145 mmol/L    Potassium 3.3 (L) 3.5 - 5.1 mmol/L    Chloride 105 97 - 108 mmol/L    CO2 22 21 - 32 mmol/L    Anion gap 11 5 - 15 mmol/L    Glucose 121 (H) 65 - 100 mg/dL    BUN 22 (H) 6 - 20 MG/DL    Creatinine 1.81 (H) 0.70 - 1.30 MG/DL    BUN/Creatinine ratio 12 12 - 20      GFR est AA 46 (L) >60 ml/min/1.73m2    GFR est non-AA 38 (L) >60 ml/min/1.73m2    Calcium 8.7 8.5 - 10.1 MG/DL    Phosphorus 2.8 2.6 - 4.7 MG/DL    Albumin 2.9 (L) 3.5 - 5.0 g/dL

## 2017-03-23 ENCOUNTER — HOSPITAL ENCOUNTER (OUTPATIENT)
Dept: INFUSION THERAPY | Age: 62
Discharge: HOME | End: 2017-03-23
Payer: COMMERCIAL

## 2017-03-23 VITALS
HEART RATE: 63 BPM | DIASTOLIC BLOOD PRESSURE: 54 MMHG | OXYGEN SATURATION: 97 % | SYSTOLIC BLOOD PRESSURE: 113 MMHG | RESPIRATION RATE: 20 BRPM | TEMPERATURE: 97.8 F

## 2017-03-23 LAB
ALBUMIN SERPL BCP-MCNC: 2.8 G/DL (ref 3.5–5)
ANION GAP BLD CALC-SCNC: 7 MMOL/L (ref 5–15)
BUN SERPL-MCNC: 22 MG/DL (ref 6–20)
BUN/CREAT SERPL: 11 (ref 12–20)
CALCIUM SERPL-MCNC: 8.6 MG/DL (ref 8.5–10.1)
CHLORIDE SERPL-SCNC: 109 MMOL/L (ref 97–108)
CO2 SERPL-SCNC: 25 MMOL/L (ref 21–32)
CREAT SERPL-MCNC: 1.96 MG/DL (ref 0.7–1.3)
GLUCOSE SERPL-MCNC: 172 MG/DL (ref 65–100)
HCT VFR BLD AUTO: 28.3 % (ref 36.6–50.3)
HGB BLD-MCNC: 8.9 G/DL (ref 12.1–17)
PHOSPHATE SERPL-MCNC: 2.8 MG/DL (ref 2.6–4.7)
POTASSIUM SERPL-SCNC: 3.5 MMOL/L (ref 3.5–5.1)
SODIUM SERPL-SCNC: 141 MMOL/L (ref 136–145)

## 2017-03-23 PROCEDURE — 85018 HEMOGLOBIN: CPT | Performed by: INTERNAL MEDICINE

## 2017-03-23 PROCEDURE — 74011250636 HC RX REV CODE- 250/636: Performed by: INTERNAL MEDICINE

## 2017-03-23 PROCEDURE — 36415 COLL VENOUS BLD VENIPUNCTURE: CPT | Performed by: INTERNAL MEDICINE

## 2017-03-23 PROCEDURE — 96372 THER/PROPH/DIAG INJ SC/IM: CPT

## 2017-03-23 PROCEDURE — 80069 RENAL FUNCTION PANEL: CPT | Performed by: INTERNAL MEDICINE

## 2017-03-23 RX ADMIN — ERYTHROPOIETIN 40000 UNITS: 40000 INJECTION, SOLUTION INTRAVENOUS; SUBCUTANEOUS at 17:06

## 2017-03-23 NOTE — PROGRESS NOTES
26 Pt admit to [facility] for labs, possible Procrit ambulatory with walker in stable condition. Assessment completed. No new concerns voiced. Labs drawn peripherally and sent. Not all labs resulted at time of note. Visit Vitals    /54 (BP 1 Location: Right arm, BP Patient Position: Sitting)    Pulse 63    Temp 97.8 °F (36.6 °C)    Resp 20    SpO2 97%       Medications:  Procrit 40,000 Units SQ in right upper arm    1515 Pt tolerated treatment well. D/c home ambulatory in no distress. Pt aware of next appointment scheduled for 3/31/17.     Recent Results (from the past 12 hour(s))   HGB & HCT    Collection Time: 03/23/17  4:42 PM   Result Value Ref Range    HGB 8.9 (L) 12.1 - 17.0 g/dL    HCT 28.3 (L) 36.6 - 50.3 %

## 2017-03-24 ENCOUNTER — HOSPITAL ENCOUNTER (OUTPATIENT)
Dept: INFUSION THERAPY | Age: 62
End: 2017-03-24
Payer: COMMERCIAL

## 2017-03-31 ENCOUNTER — HOSPITAL ENCOUNTER (OUTPATIENT)
Dept: INFUSION THERAPY | Age: 62
Discharge: HOME | End: 2017-03-31
Payer: COMMERCIAL

## 2017-03-31 VITALS
DIASTOLIC BLOOD PRESSURE: 65 MMHG | TEMPERATURE: 97.3 F | OXYGEN SATURATION: 97 % | RESPIRATION RATE: 18 BRPM | HEART RATE: 67 BPM | SYSTOLIC BLOOD PRESSURE: 119 MMHG

## 2017-03-31 LAB
ALBUMIN SERPL BCP-MCNC: 2.8 G/DL (ref 3.5–5)
ANION GAP BLD CALC-SCNC: 11 MMOL/L (ref 5–15)
BUN SERPL-MCNC: 23 MG/DL (ref 6–20)
BUN/CREAT SERPL: 13 (ref 12–20)
CALCIUM SERPL-MCNC: 8.5 MG/DL (ref 8.5–10.1)
CHLORIDE SERPL-SCNC: 106 MMOL/L (ref 97–108)
CO2 SERPL-SCNC: 22 MMOL/L (ref 21–32)
CREAT SERPL-MCNC: 1.71 MG/DL (ref 0.7–1.3)
GLUCOSE SERPL-MCNC: 198 MG/DL (ref 65–100)
HCT VFR BLD AUTO: 27.9 % (ref 36.6–50.3)
HGB BLD-MCNC: 8.8 G/DL (ref 12.1–17)
PHOSPHATE SERPL-MCNC: 2.8 MG/DL (ref 2.6–4.7)
POTASSIUM SERPL-SCNC: 3.7 MMOL/L (ref 3.5–5.1)
SODIUM SERPL-SCNC: 139 MMOL/L (ref 136–145)

## 2017-03-31 PROCEDURE — 96365 THER/PROPH/DIAG IV INF INIT: CPT

## 2017-03-31 PROCEDURE — 36415 COLL VENOUS BLD VENIPUNCTURE: CPT | Performed by: INTERNAL MEDICINE

## 2017-03-31 PROCEDURE — 96374 THER/PROPH/DIAG INJ IV PUSH: CPT

## 2017-03-31 PROCEDURE — 74011250636 HC RX REV CODE- 250/636: Performed by: INTERNAL MEDICINE

## 2017-03-31 PROCEDURE — 80069 RENAL FUNCTION PANEL: CPT | Performed by: INTERNAL MEDICINE

## 2017-03-31 PROCEDURE — 96372 THER/PROPH/DIAG INJ SC/IM: CPT

## 2017-03-31 PROCEDURE — 85018 HEMOGLOBIN: CPT | Performed by: INTERNAL MEDICINE

## 2017-03-31 PROCEDURE — 74011000258 HC RX REV CODE- 258: Performed by: INTERNAL MEDICINE

## 2017-03-31 RX ORDER — SODIUM CHLORIDE 9 MG/ML
25 INJECTION, SOLUTION INTRAVENOUS AS NEEDED
Status: DISPENSED | OUTPATIENT
Start: 2017-03-31 | End: 2017-04-01

## 2017-03-31 RX ORDER — SODIUM CHLORIDE 0.9 % (FLUSH) 0.9 %
5-10 SYRINGE (ML) INJECTION AS NEEDED
Status: ACTIVE | OUTPATIENT
Start: 2017-03-31 | End: 2017-04-01

## 2017-03-31 RX ADMIN — ERYTHROPOIETIN 40000 UNITS: 40000 INJECTION, SOLUTION INTRAVENOUS; SUBCUTANEOUS at 16:17

## 2017-03-31 RX ADMIN — FERUMOXYTOL 510 MG: 510 INJECTION INTRAVENOUS at 16:10

## 2017-03-31 RX ADMIN — SODIUM CHLORIDE 25 ML/HR: 900 INJECTION, SOLUTION INTRAVENOUS at 16:12

## 2017-03-31 RX ADMIN — Medication 10 ML: at 16:12

## 2017-03-31 NOTE — PROGRESS NOTES
Outpatient Infusion Center - Chemotherapy Progress Note    4477 Pt admit to [facility] for Procrit/Feraheme ambulatory with walker in stable condition. Assessment completed. No new concerns voiced. PIV started with positive blood return. Labs drawn per order and sent. Line flushed, NS infusing KVO. Visit Vitals    /65 (BP 1 Location: Right arm, BP Patient Position: At rest)    Pulse 67    Temp 97.3 °F (36.3 °C)    Resp 18    SpO2 97%       Medications:  Procrit 40,000 Units SQ in R upper arm. Feraheme    1645 Pt tolerated treatment well. D/c home ambulatory in no distress. Pt aware of next [facility] appointment scheduled for 4/7/17.     Recent Results (from the past 12 hour(s))   HGB & HCT    Collection Time: 03/31/17  3:34 PM   Result Value Ref Range    HGB 8.8 (L) 12.1 - 17.0 g/dL    HCT 27.9 (L) 36.6 - 50.3 %   RENAL FUNCTION PANEL    Collection Time: 03/31/17  3:34 PM   Result Value Ref Range    Sodium 139 136 - 145 mmol/L    Potassium 3.7 3.5 - 5.1 mmol/L    Chloride 106 97 - 108 mmol/L    CO2 22 21 - 32 mmol/L    Anion gap 11 5 - 15 mmol/L    Glucose 198 (H) 65 - 100 mg/dL    BUN 23 (H) 6 - 20 MG/DL    Creatinine 1.71 (H) 0.70 - 1.30 MG/DL    BUN/Creatinine ratio 13 12 - 20      GFR est AA 49 (L) >60 ml/min/1.73m2    GFR est non-AA 41 (L) >60 ml/min/1.73m2    Calcium 8.5 8.5 - 10.1 MG/DL    Phosphorus 2.8 2.6 - 4.7 MG/DL    Albumin 2.8 (L) 3.5 - 5.0 g/dL

## 2017-04-06 ENCOUNTER — HOSPITAL ENCOUNTER (OUTPATIENT)
Dept: INFUSION THERAPY | Age: 62
Discharge: HOME | End: 2017-04-06
Payer: COMMERCIAL

## 2017-04-06 VITALS
HEART RATE: 66 BPM | SYSTOLIC BLOOD PRESSURE: 120 MMHG | DIASTOLIC BLOOD PRESSURE: 77 MMHG | RESPIRATION RATE: 18 BRPM | TEMPERATURE: 98 F

## 2017-04-06 LAB
ALBUMIN SERPL BCP-MCNC: 2.8 G/DL (ref 3.5–5)
ANION GAP BLD CALC-SCNC: 9 MMOL/L (ref 5–15)
BUN SERPL-MCNC: 20 MG/DL (ref 6–20)
BUN/CREAT SERPL: 11 (ref 12–20)
CALCIUM SERPL-MCNC: 8.7 MG/DL (ref 8.5–10.1)
CHLORIDE SERPL-SCNC: 107 MMOL/L (ref 97–108)
CO2 SERPL-SCNC: 23 MMOL/L (ref 21–32)
CREAT SERPL-MCNC: 1.77 MG/DL (ref 0.7–1.3)
GLUCOSE SERPL-MCNC: 134 MG/DL (ref 65–100)
HCT VFR BLD AUTO: 28.7 % (ref 36.6–50.3)
HGB BLD-MCNC: 8.9 G/DL (ref 12.1–17)
PHOSPHATE SERPL-MCNC: 3.5 MG/DL (ref 2.6–4.7)
POTASSIUM SERPL-SCNC: 3.6 MMOL/L (ref 3.5–5.1)
SODIUM SERPL-SCNC: 139 MMOL/L (ref 136–145)

## 2017-04-06 PROCEDURE — 85018 HEMOGLOBIN: CPT

## 2017-04-06 PROCEDURE — 96374 THER/PROPH/DIAG INJ IV PUSH: CPT

## 2017-04-06 PROCEDURE — 96372 THER/PROPH/DIAG INJ SC/IM: CPT

## 2017-04-06 PROCEDURE — 36415 COLL VENOUS BLD VENIPUNCTURE: CPT

## 2017-04-06 PROCEDURE — 74011000258 HC RX REV CODE- 258: Performed by: INTERNAL MEDICINE

## 2017-04-06 PROCEDURE — 74011250636 HC RX REV CODE- 250/636: Performed by: INTERNAL MEDICINE

## 2017-04-06 PROCEDURE — 80069 RENAL FUNCTION PANEL: CPT

## 2017-04-06 RX ORDER — SODIUM CHLORIDE 0.9 % (FLUSH) 0.9 %
5-10 SYRINGE (ML) INJECTION AS NEEDED
Status: ACTIVE | OUTPATIENT
Start: 2017-04-06 | End: 2017-04-07

## 2017-04-06 RX ORDER — SODIUM CHLORIDE 9 MG/ML
25 INJECTION, SOLUTION INTRAVENOUS AS NEEDED
Status: DISPENSED | OUTPATIENT
Start: 2017-04-06 | End: 2017-04-07

## 2017-04-06 RX ADMIN — FERUMOXYTOL 510 MG: 510 INJECTION INTRAVENOUS at 18:18

## 2017-04-06 RX ADMIN — ERYTHROPOIETIN 40000 UNITS: 40000 INJECTION, SOLUTION INTRAVENOUS; SUBCUTANEOUS at 18:41

## 2017-04-06 NOTE — PROGRESS NOTES
Outpatient Infusion Center Short Visit Progress Note    4647 Pt admit to [facility] for Procrit and Feraheme ambulatory in stable condition. Assessment completed. No new concerns voiced. Please review pending lab results in [address] [place] [address]. Visit Vitals    /77    Pulse 66    Temp 98 °F (36.7 °C)    Resp 18       PIV with positive blood return. Lab drawn, flushed and de-accessed per protocol. Medications:  Procrit  Feraheme     1845 Pt tolerated treatment well. D/c home ambulatory in no distress. Pt aware of next appointment scheduled for 4/13/17.     Recent Results (from the past 12 hour(s))   HGB & HCT    Collection Time: 04/06/17  6:01 PM   Result Value Ref Range    HGB 8.9 (L) 12.1 - 17.0 g/dL    HCT 28.7 (L) 36.6 - 50.3 %   RENAL FUNCTION PANEL    Collection Time: 04/06/17  6:01 PM   Result Value Ref Range    Sodium 139 136 - 145 mmol/L    Potassium 3.6 3.5 - 5.1 mmol/L    Chloride 107 97 - 108 mmol/L    CO2 23 21 - 32 mmol/L    Anion gap 9 5 - 15 mmol/L    Glucose 134 (H) 65 - 100 mg/dL    BUN 20 6 - 20 MG/DL    Creatinine 1.77 (H) 0.70 - 1.30 MG/DL    BUN/Creatinine ratio 11 (L) 12 - 20      GFR est AA 47 (L) >60 ml/min/1.73m2    GFR est non-AA 39 (L) >60 ml/min/1.73m2    Calcium 8.7 8.5 - 10.1 MG/DL    Phosphorus 3.5 2.6 - 4.7 MG/DL    Albumin 2.8 (L) 3.5 - 5.0 g/dL

## 2017-04-07 ENCOUNTER — APPOINTMENT (OUTPATIENT)
Dept: INFUSION THERAPY | Age: 62
End: 2017-04-07
Payer: COMMERCIAL

## 2017-04-13 ENCOUNTER — HOSPITAL ENCOUNTER (OUTPATIENT)
Dept: INFUSION THERAPY | Age: 62
Discharge: HOME | End: 2017-04-13
Payer: COMMERCIAL

## 2017-04-13 VITALS
TEMPERATURE: 96.8 F | OXYGEN SATURATION: 98 % | SYSTOLIC BLOOD PRESSURE: 115 MMHG | RESPIRATION RATE: 18 BRPM | DIASTOLIC BLOOD PRESSURE: 62 MMHG | HEART RATE: 67 BPM

## 2017-04-13 LAB
ALBUMIN SERPL BCP-MCNC: 2.7 G/DL (ref 3.5–5)
ANION GAP BLD CALC-SCNC: 6 MMOL/L (ref 5–15)
BUN SERPL-MCNC: 17 MG/DL (ref 6–20)
BUN/CREAT SERPL: 10 (ref 12–20)
CALCIUM SERPL-MCNC: 8.7 MG/DL (ref 8.5–10.1)
CHLORIDE SERPL-SCNC: 108 MMOL/L (ref 97–108)
CO2 SERPL-SCNC: 26 MMOL/L (ref 21–32)
CREAT SERPL-MCNC: 1.64 MG/DL (ref 0.7–1.3)
GLUCOSE SERPL-MCNC: 139 MG/DL (ref 65–100)
HCT VFR BLD AUTO: 32.2 % (ref 36.6–50.3)
HGB BLD-MCNC: 10.1 G/DL (ref 12.1–17)
PHOSPHATE SERPL-MCNC: 2.9 MG/DL (ref 2.6–4.7)
POTASSIUM SERPL-SCNC: 3.4 MMOL/L (ref 3.5–5.1)
SODIUM SERPL-SCNC: 140 MMOL/L (ref 136–145)

## 2017-04-13 PROCEDURE — 96372 THER/PROPH/DIAG INJ SC/IM: CPT

## 2017-04-13 PROCEDURE — 80069 RENAL FUNCTION PANEL: CPT | Performed by: INTERNAL MEDICINE

## 2017-04-13 PROCEDURE — 74011250636 HC RX REV CODE- 250/636: Performed by: INTERNAL MEDICINE

## 2017-04-13 PROCEDURE — 36415 COLL VENOUS BLD VENIPUNCTURE: CPT | Performed by: INTERNAL MEDICINE

## 2017-04-13 PROCEDURE — 85018 HEMOGLOBIN: CPT | Performed by: INTERNAL MEDICINE

## 2017-04-13 RX ADMIN — ERYTHROPOIETIN 40000 UNITS: 40000 INJECTION, SOLUTION INTRAVENOUS; SUBCUTANEOUS at 17:40

## 2017-04-13 NOTE — PROGRESS NOTES
Outpatient Infusion Center Short Visit Progress Note    0031 Pt admit to [facility] for Procrit ambulatory in stable condition. Assessment completed. No new concerns voiced. Labs drawn peripherally. Labs reviewed and procrit required per order. Hgb 10.1 and Hct 32.2. Visit Vitals    /62    Pulse 67    Temp 96.8 °F (36 °C)    Resp 18    SpO2 98%       Medications:  Procrit SQ right arm    1740 Pt tolerated treatment well. D/c home ambulatory in no distress. Pt aware of next appointment scheduled for 4/20/17.

## 2017-04-14 ENCOUNTER — APPOINTMENT (OUTPATIENT)
Dept: INFUSION THERAPY | Age: 62
End: 2017-04-14
Payer: COMMERCIAL

## 2017-04-20 ENCOUNTER — HOSPITAL ENCOUNTER (OUTPATIENT)
Dept: INFUSION THERAPY | Age: 62
Discharge: HOME | End: 2017-04-20
Payer: COMMERCIAL

## 2017-04-20 VITALS
SYSTOLIC BLOOD PRESSURE: 120 MMHG | DIASTOLIC BLOOD PRESSURE: 74 MMHG | HEART RATE: 70 BPM | RESPIRATION RATE: 18 BRPM | TEMPERATURE: 96.7 F

## 2017-04-20 LAB
ALBUMIN SERPL BCP-MCNC: 2.7 G/DL (ref 3.5–5)
ANION GAP BLD CALC-SCNC: 9 MMOL/L (ref 5–15)
BUN SERPL-MCNC: 26 MG/DL (ref 6–20)
BUN/CREAT SERPL: 14 (ref 12–20)
CALCIUM SERPL-MCNC: 8.6 MG/DL (ref 8.5–10.1)
CHLORIDE SERPL-SCNC: 107 MMOL/L (ref 97–108)
CO2 SERPL-SCNC: 23 MMOL/L (ref 21–32)
CREAT SERPL-MCNC: 1.81 MG/DL (ref 0.7–1.3)
GLUCOSE SERPL-MCNC: 154 MG/DL (ref 65–100)
HCT VFR BLD AUTO: 31 % (ref 36.6–50.3)
HGB BLD-MCNC: 10 G/DL (ref 12.1–17)
PHOSPHATE SERPL-MCNC: 2.7 MG/DL (ref 2.6–4.7)
POTASSIUM SERPL-SCNC: 3.8 MMOL/L (ref 3.5–5.1)
SODIUM SERPL-SCNC: 139 MMOL/L (ref 136–145)

## 2017-04-20 PROCEDURE — 85018 HEMOGLOBIN: CPT | Performed by: INTERNAL MEDICINE

## 2017-04-20 PROCEDURE — 80069 RENAL FUNCTION PANEL: CPT | Performed by: INTERNAL MEDICINE

## 2017-04-20 PROCEDURE — 74011250636 HC RX REV CODE- 250/636: Performed by: INTERNAL MEDICINE

## 2017-04-20 PROCEDURE — 36415 COLL VENOUS BLD VENIPUNCTURE: CPT | Performed by: INTERNAL MEDICINE

## 2017-04-20 PROCEDURE — 96372 THER/PROPH/DIAG INJ SC/IM: CPT

## 2017-04-20 RX ADMIN — ERYTHROPOIETIN 40000 UNITS: 40000 INJECTION, SOLUTION INTRAVENOUS; SUBCUTANEOUS at 17:41

## 2017-04-21 ENCOUNTER — APPOINTMENT (OUTPATIENT)
Dept: INFUSION THERAPY | Age: 62
End: 2017-04-21
Payer: COMMERCIAL

## 2017-05-11 PROBLEM — T84.012A FAILED TOTAL RIGHT KNEE REPLACEMENT (HCC): Status: ACTIVE | Noted: 2017-05-11

## 2017-05-11 RX ORDER — FUROSEMIDE 40 MG/1
40 TABLET ORAL EVERY OTHER DAY
COMMUNITY
End: 2017-05-23 | Stop reason: ALTCHOICE

## 2017-05-11 RX ORDER — METOPROLOL TARTRATE 25 MG/1
12.5 TABLET, FILM COATED ORAL 2 TIMES DAILY
COMMUNITY

## 2017-05-11 NOTE — PERIOP NOTES
Pre-operative instructions reviewed and patient verbalizes understanding of instructions. Patient has been given the opportunity to ask additional questions. Patient STATES HAS SIX packs of CHG wipes FORM PREVIOUS CANCELED SURGERY. STATES HAS NO QUESTIONS on use of CHG wipes.

## 2017-05-12 ENCOUNTER — ANESTHESIA EVENT (OUTPATIENT)
Dept: SURGERY | Age: 62
DRG: 466 | End: 2017-05-12
Payer: COMMERCIAL

## 2017-05-12 ENCOUNTER — HOSPITAL ENCOUNTER (INPATIENT)
Age: 62
LOS: 10 days | Discharge: HOME HEALTH | DRG: 466 | End: 2017-05-22
Attending: ORTHOPAEDIC SURGERY | Admitting: ORTHOPAEDIC SURGERY
Payer: COMMERCIAL

## 2017-05-12 ENCOUNTER — ANESTHESIA (OUTPATIENT)
Dept: SURGERY | Age: 62
DRG: 466 | End: 2017-05-12
Payer: COMMERCIAL

## 2017-05-12 ENCOUNTER — APPOINTMENT (OUTPATIENT)
Dept: GENERAL RADIOLOGY | Age: 62
DRG: 466 | End: 2017-05-12
Attending: ORTHOPAEDIC SURGERY
Payer: COMMERCIAL

## 2017-05-12 LAB
ALBUMIN SERPL BCP-MCNC: 2.9 G/DL (ref 3.5–5)
ALBUMIN/GLOB SERPL: 0.7 {RATIO} (ref 1.1–2.2)
ALP SERPL-CCNC: 136 U/L (ref 45–117)
ALT SERPL-CCNC: 17 U/L (ref 12–78)
ANION GAP BLD CALC-SCNC: 7 MMOL/L (ref 5–15)
ARTERIAL PATENCY WRIST A: YES
AST SERPL W P-5'-P-CCNC: 28 U/L (ref 15–37)
BASE DEFICIT BLD-SCNC: 6 MMOL/L
BDY SITE: ABNORMAL
BILIRUB DIRECT SERPL-MCNC: 0.8 MG/DL (ref 0–0.2)
BILIRUB SERPL-MCNC: 1.8 MG/DL (ref 0.2–1)
BUN SERPL-MCNC: 32 MG/DL (ref 6–20)
BUN/CREAT SERPL: 17 (ref 12–20)
CALCIUM SERPL-MCNC: 8.6 MG/DL (ref 8.5–10.1)
CHLORIDE SERPL-SCNC: 108 MMOL/L (ref 97–108)
CO2 SERPL-SCNC: 24 MMOL/L (ref 21–32)
CREAT SERPL-MCNC: 1.84 MG/DL (ref 0.7–1.3)
ERYTHROCYTE [DISTWIDTH] IN BLOOD BY AUTOMATED COUNT: 18.1 % (ref 11.5–14.5)
GAS FLOW.O2 O2 DELIVERY SYS: ABNORMAL L/MIN
GLOBULIN SER CALC-MCNC: 3.9 G/DL (ref 2–4)
GLUCOSE BLD STRIP.AUTO-MCNC: 115 MG/DL (ref 65–100)
GLUCOSE BLD STRIP.AUTO-MCNC: 119 MG/DL (ref 65–100)
GLUCOSE BLD STRIP.AUTO-MCNC: 143 MG/DL (ref 65–100)
GLUCOSE SERPL-MCNC: 114 MG/DL (ref 65–100)
HCO3 BLD-SCNC: 20.4 MMOL/L (ref 22–26)
HCT VFR BLD AUTO: 30.2 % (ref 36.6–50.3)
HGB BLD-MCNC: 9.8 G/DL (ref 12.1–17)
INR BLD: 1.3 (ref 0.9–1.2)
MCH RBC QN AUTO: 27.4 PG (ref 26–34)
MCHC RBC AUTO-ENTMCNC: 32.5 G/DL (ref 30–36.5)
MCV RBC AUTO: 84.4 FL (ref 80–99)
O2/TOTAL GAS SETTING VFR VENT: 100 %
PCO2 BLD: 40.6 MMHG (ref 35–45)
PH BLD: 7.31 [PH] (ref 7.35–7.45)
PLATELET # BLD AUTO: 83 K/UL (ref 150–400)
PO2 BLD: 132 MMHG (ref 80–100)
POTASSIUM SERPL-SCNC: 3.8 MMOL/L (ref 3.5–5.1)
PROT SERPL-MCNC: 6.8 G/DL (ref 6.4–8.2)
RBC # BLD AUTO: 3.58 M/UL (ref 4.1–5.7)
SAO2 % BLD: 99 % (ref 92–97)
SERVICE CMNT-IMP: ABNORMAL
SODIUM SERPL-SCNC: 139 MMOL/L (ref 136–145)
SPECIMEN TYPE: ABNORMAL
TOTAL RESP. RATE, ITRR: 19
VENTILATION MODE VENT: ABNORMAL
WBC # BLD AUTO: 4.5 K/UL (ref 4.1–11.1)

## 2017-05-12 PROCEDURE — 82962 GLUCOSE BLOOD TEST: CPT

## 2017-05-12 PROCEDURE — 86900 BLOOD TYPING SEROLOGIC ABO: CPT | Performed by: ORTHOPAEDIC SURGERY

## 2017-05-12 PROCEDURE — 86870 RBC ANTIBODY IDENTIFICATION: CPT | Performed by: ORTHOPAEDIC SURGERY

## 2017-05-12 PROCEDURE — 77030018822 HC SLV COMPR FT COVD -B

## 2017-05-12 PROCEDURE — 86920 COMPATIBILITY TEST SPIN: CPT | Performed by: ORTHOPAEDIC SURGERY

## 2017-05-12 PROCEDURE — 77030011640 HC PAD GRND REM COVD -A: Performed by: ORTHOPAEDIC SURGERY

## 2017-05-12 PROCEDURE — 0SPC0JZ REMOVAL OF SYNTHETIC SUBSTITUTE FROM RIGHT KNEE JOINT, OPEN APPROACH: ICD-10-PCS | Performed by: ORTHOPAEDIC SURGERY

## 2017-05-12 PROCEDURE — 77030034850: Performed by: ORTHOPAEDIC SURGERY

## 2017-05-12 PROCEDURE — 77030010788: Performed by: ORTHOPAEDIC SURGERY

## 2017-05-12 PROCEDURE — 86904 BLOOD TYPING PATIENT SERUM: CPT

## 2017-05-12 PROCEDURE — 86885 COOMBS TEST INDIRECT QUAL: CPT

## 2017-05-12 PROCEDURE — 77030012935 HC DRSG AQUACEL BMS -B: Performed by: ORTHOPAEDIC SURGERY

## 2017-05-12 PROCEDURE — 77030028224 HC PDNG CST BSNM -A: Performed by: ORTHOPAEDIC SURGERY

## 2017-05-12 PROCEDURE — 74011000250 HC RX REV CODE- 250: Performed by: PHYSICIAN ASSISTANT

## 2017-05-12 PROCEDURE — 74011000258 HC RX REV CODE- 258: Performed by: PHYSICIAN ASSISTANT

## 2017-05-12 PROCEDURE — 74011000250 HC RX REV CODE- 250: Performed by: ANESTHESIOLOGY

## 2017-05-12 PROCEDURE — 85610 PROTHROMBIN TIME: CPT

## 2017-05-12 PROCEDURE — 86922 COMPATIBILITY TEST ANTIGLOB: CPT | Performed by: ORTHOPAEDIC SURGERY

## 2017-05-12 PROCEDURE — 36600 WITHDRAWAL OF ARTERIAL BLOOD: CPT

## 2017-05-12 PROCEDURE — 86880 COOMBS TEST DIRECT: CPT

## 2017-05-12 PROCEDURE — 77030012894

## 2017-05-12 PROCEDURE — 76060000038 HC ANESTHESIA 3.5 TO 4 HR: Performed by: ORTHOPAEDIC SURGERY

## 2017-05-12 PROCEDURE — 77030020365 HC SOL INJ SOD CL 0.9% 50ML: Performed by: ORTHOPAEDIC SURGERY

## 2017-05-12 PROCEDURE — 77030008467 HC STPLR SKN COVD -B: Performed by: ORTHOPAEDIC SURGERY

## 2017-05-12 PROCEDURE — 86921 COMPATIBILITY TEST INCUBATE: CPT | Performed by: ORTHOPAEDIC SURGERY

## 2017-05-12 PROCEDURE — 74011250636 HC RX REV CODE- 250/636

## 2017-05-12 PROCEDURE — 82248 BILIRUBIN DIRECT: CPT | Performed by: ANESTHESIOLOGY

## 2017-05-12 PROCEDURE — 77030006835 HC BLD SAW SAG STRY -B: Performed by: ORTHOPAEDIC SURGERY

## 2017-05-12 PROCEDURE — 77030000032 HC CUF TRNQT ZIMM -B: Performed by: ORTHOPAEDIC SURGERY

## 2017-05-12 PROCEDURE — 77030031139 HC SUT VCRL2 J&J -A: Performed by: ORTHOPAEDIC SURGERY

## 2017-05-12 PROCEDURE — 82803 BLOOD GASES ANY COMBINATION: CPT

## 2017-05-12 PROCEDURE — 36415 COLL VENOUS BLD VENIPUNCTURE: CPT | Performed by: ORTHOPAEDIC SURGERY

## 2017-05-12 PROCEDURE — 77030013079 HC BLNKT BAIR HGGR 3M -A: Performed by: ANESTHESIOLOGY

## 2017-05-12 PROCEDURE — L1830 KO IMMOB CANVAS LONG PRE OTS: HCPCS | Performed by: ORTHOPAEDIC SURGERY

## 2017-05-12 PROCEDURE — 85027 COMPLETE CBC AUTOMATED: CPT | Performed by: ANESTHESIOLOGY

## 2017-05-12 PROCEDURE — 74011250637 HC RX REV CODE- 250/637: Performed by: ORTHOPAEDIC SURGERY

## 2017-05-12 PROCEDURE — 74011250636 HC RX REV CODE- 250/636: Performed by: ORTHOPAEDIC SURGERY

## 2017-05-12 PROCEDURE — 74011250636 HC RX REV CODE- 250/636: Performed by: PHYSICIAN ASSISTANT

## 2017-05-12 PROCEDURE — 80053 COMPREHEN METABOLIC PANEL: CPT | Performed by: ANESTHESIOLOGY

## 2017-05-12 PROCEDURE — 74011250637 HC RX REV CODE- 250/637: Performed by: PHYSICIAN ASSISTANT

## 2017-05-12 PROCEDURE — 77030020788: Performed by: ORTHOPAEDIC SURGERY

## 2017-05-12 PROCEDURE — C9290 INJ, BUPIVACAINE LIPOSOME: HCPCS | Performed by: PHYSICIAN ASSISTANT

## 2017-05-12 PROCEDURE — 74011000250 HC RX REV CODE- 250: Performed by: ORTHOPAEDIC SURGERY

## 2017-05-12 PROCEDURE — C1776 JOINT DEVICE (IMPLANTABLE): HCPCS | Performed by: ORTHOPAEDIC SURGERY

## 2017-05-12 PROCEDURE — 65270000029 HC RM PRIVATE

## 2017-05-12 PROCEDURE — 77030018836 HC SOL IRR NACL ICUM -A: Performed by: ORTHOPAEDIC SURGERY

## 2017-05-12 PROCEDURE — 0SRC0J9 REPLACEMENT OF RIGHT KNEE JOINT WITH SYNTHETIC SUBSTITUTE, CEMENTED, OPEN APPROACH: ICD-10-PCS | Performed by: ORTHOPAEDIC SURGERY

## 2017-05-12 PROCEDURE — 86850 RBC ANTIBODY SCREEN: CPT

## 2017-05-12 PROCEDURE — 76210000002 HC OR PH I REC 3 TO 3.5 HR: Performed by: ORTHOPAEDIC SURGERY

## 2017-05-12 PROCEDURE — 76010000174 HC OR TIME 3.5 TO 4 HR INTENSV-TIER 1: Performed by: ORTHOPAEDIC SURGERY

## 2017-05-12 PROCEDURE — 77030008684 HC TU ET CUF COVD -B: Performed by: ANESTHESIOLOGY

## 2017-05-12 PROCEDURE — 73560 X-RAY EXAM OF KNEE 1 OR 2: CPT

## 2017-05-12 PROCEDURE — 77030012406 HC DRN WND PENRS BARD -A: Performed by: ORTHOPAEDIC SURGERY

## 2017-05-12 PROCEDURE — P9045 ALBUMIN (HUMAN), 5%, 250 ML: HCPCS

## 2017-05-12 PROCEDURE — 77030026438 HC STYL ET INTUB CARD -A: Performed by: ANESTHESIOLOGY

## 2017-05-12 PROCEDURE — 86870 RBC ANTIBODY IDENTIFICATION: CPT

## 2017-05-12 PROCEDURE — 74011000250 HC RX REV CODE- 250

## 2017-05-12 PROCEDURE — 77030018846 HC SOL IRR STRL H20 ICUM -A: Performed by: ORTHOPAEDIC SURGERY

## 2017-05-12 PROCEDURE — 74011250636 HC RX REV CODE- 250/636: Performed by: ANESTHESIOLOGY

## 2017-05-12 PROCEDURE — C1713 ANCHOR/SCREW BN/BN,TIS/BN: HCPCS | Performed by: ORTHOPAEDIC SURGERY

## 2017-05-12 DEVICE — IMPLANTABLE DEVICE: Type: IMPLANTABLE DEVICE | Site: KNEE | Status: FUNCTIONAL

## 2017-05-12 DEVICE — CEMENT BNE 40GM FULL DOSE PMMA W/ GENT HI VISC RADPQ LNG: Type: IMPLANTABLE DEVICE | Site: KNEE | Status: FUNCTIONAL

## 2017-05-12 DEVICE — PAT INST NXGN 38MM POLYETH --: Type: IMPLANTABLE DEVICE | Site: KNEE | Status: FUNCTIONAL

## 2017-05-12 RX ORDER — HYDROXYZINE HYDROCHLORIDE 10 MG/1
10 TABLET, FILM COATED ORAL
Status: DISCONTINUED | OUTPATIENT
Start: 2017-05-12 | End: 2017-05-22 | Stop reason: HOSPADM

## 2017-05-12 RX ORDER — MIDAZOLAM HYDROCHLORIDE 1 MG/ML
0.5 INJECTION, SOLUTION INTRAMUSCULAR; INTRAVENOUS
Status: DISCONTINUED | OUTPATIENT
Start: 2017-05-12 | End: 2017-05-12 | Stop reason: HOSPADM

## 2017-05-12 RX ORDER — MIDAZOLAM HYDROCHLORIDE 1 MG/ML
1 INJECTION, SOLUTION INTRAMUSCULAR; INTRAVENOUS AS NEEDED
Status: DISCONTINUED | OUTPATIENT
Start: 2017-05-12 | End: 2017-05-12 | Stop reason: HOSPADM

## 2017-05-12 RX ORDER — DIPHENHYDRAMINE HYDROCHLORIDE 50 MG/ML
12.5 INJECTION, SOLUTION INTRAMUSCULAR; INTRAVENOUS AS NEEDED
Status: DISCONTINUED | OUTPATIENT
Start: 2017-05-12 | End: 2017-05-12 | Stop reason: HOSPADM

## 2017-05-12 RX ORDER — METOPROLOL TARTRATE 25 MG/1
25 TABLET, FILM COATED ORAL
Status: DISCONTINUED | OUTPATIENT
Start: 2017-05-12 | End: 2017-05-22 | Stop reason: HOSPADM

## 2017-05-12 RX ORDER — VANCOMYCIN 2 GRAM/500 ML IN 0.9 % SODIUM CHLORIDE INTRAVENOUS
2000 ONCE
Status: COMPLETED | OUTPATIENT
Start: 2017-05-12 | End: 2017-05-12

## 2017-05-12 RX ORDER — NALOXONE HYDROCHLORIDE 0.4 MG/ML
0.4 INJECTION, SOLUTION INTRAMUSCULAR; INTRAVENOUS; SUBCUTANEOUS AS NEEDED
Status: DISCONTINUED | OUTPATIENT
Start: 2017-05-12 | End: 2017-05-22 | Stop reason: HOSPADM

## 2017-05-12 RX ORDER — SODIUM CHLORIDE 0.9 % (FLUSH) 0.9 %
5-10 SYRINGE (ML) INJECTION EVERY 8 HOURS
Status: DISCONTINUED | OUTPATIENT
Start: 2017-05-13 | End: 2017-05-22 | Stop reason: HOSPADM

## 2017-05-12 RX ORDER — ONDANSETRON 2 MG/ML
4 INJECTION INTRAMUSCULAR; INTRAVENOUS AS NEEDED
Status: DISCONTINUED | OUTPATIENT
Start: 2017-05-12 | End: 2017-05-12 | Stop reason: HOSPADM

## 2017-05-12 RX ORDER — FENTANYL CITRATE 50 UG/ML
25 INJECTION, SOLUTION INTRAMUSCULAR; INTRAVENOUS
Status: DISCONTINUED | OUTPATIENT
Start: 2017-05-12 | End: 2017-05-12 | Stop reason: HOSPADM

## 2017-05-12 RX ORDER — MORPHINE SULFATE 10 MG/ML
2 INJECTION, SOLUTION INTRAMUSCULAR; INTRAVENOUS
Status: DISCONTINUED | OUTPATIENT
Start: 2017-05-12 | End: 2017-05-12 | Stop reason: HOSPADM

## 2017-05-12 RX ORDER — FENTANYL CITRATE 50 UG/ML
50 INJECTION, SOLUTION INTRAMUSCULAR; INTRAVENOUS AS NEEDED
Status: DISCONTINUED | OUTPATIENT
Start: 2017-05-12 | End: 2017-05-12 | Stop reason: HOSPADM

## 2017-05-12 RX ORDER — SODIUM CHLORIDE 9 MG/ML
125 INJECTION, SOLUTION INTRAVENOUS CONTINUOUS
Status: DISCONTINUED | OUTPATIENT
Start: 2017-05-12 | End: 2017-05-13

## 2017-05-12 RX ORDER — NEOSTIGMINE METHYLSULFATE 1 MG/ML
INJECTION INTRAVENOUS AS NEEDED
Status: DISCONTINUED | OUTPATIENT
Start: 2017-05-12 | End: 2017-05-12 | Stop reason: HOSPADM

## 2017-05-12 RX ORDER — GLYCOPYRROLATE 0.2 MG/ML
INJECTION INTRAMUSCULAR; INTRAVENOUS AS NEEDED
Status: DISCONTINUED | OUTPATIENT
Start: 2017-05-12 | End: 2017-05-12 | Stop reason: HOSPADM

## 2017-05-12 RX ORDER — WARFARIN SODIUM 5 MG/1
5 TABLET ORAL ONCE
Status: COMPLETED | OUTPATIENT
Start: 2017-05-12 | End: 2017-05-12

## 2017-05-12 RX ORDER — SODIUM CHLORIDE, SODIUM LACTATE, POTASSIUM CHLORIDE, CALCIUM CHLORIDE 600; 310; 30; 20 MG/100ML; MG/100ML; MG/100ML; MG/100ML
125 INJECTION, SOLUTION INTRAVENOUS CONTINUOUS
Status: DISCONTINUED | OUTPATIENT
Start: 2017-05-12 | End: 2017-05-12 | Stop reason: HOSPADM

## 2017-05-12 RX ORDER — FUROSEMIDE 40 MG/1
40 TABLET ORAL EVERY OTHER DAY
Status: DISCONTINUED | OUTPATIENT
Start: 2017-05-12 | End: 2017-05-13

## 2017-05-12 RX ORDER — KETAMINE HYDROCHLORIDE 10 MG/ML
INJECTION, SOLUTION INTRAMUSCULAR; INTRAVENOUS AS NEEDED
Status: DISCONTINUED | OUTPATIENT
Start: 2017-05-12 | End: 2017-05-12 | Stop reason: HOSPADM

## 2017-05-12 RX ORDER — ALBUMIN HUMAN 50 G/1000ML
SOLUTION INTRAVENOUS AS NEEDED
Status: DISCONTINUED | OUTPATIENT
Start: 2017-05-12 | End: 2017-05-12 | Stop reason: HOSPADM

## 2017-05-12 RX ORDER — KETOROLAC TROMETHAMINE 30 MG/ML
30 INJECTION, SOLUTION INTRAMUSCULAR; INTRAVENOUS EVERY 6 HOURS
Status: DISCONTINUED | OUTPATIENT
Start: 2017-05-12 | End: 2017-05-13

## 2017-05-12 RX ORDER — LIDOCAINE HYDROCHLORIDE 10 MG/ML
0.1 INJECTION, SOLUTION EPIDURAL; INFILTRATION; INTRACAUDAL; PERINEURAL AS NEEDED
Status: DISCONTINUED | OUTPATIENT
Start: 2017-05-12 | End: 2017-05-12 | Stop reason: HOSPADM

## 2017-05-12 RX ORDER — ONDANSETRON 2 MG/ML
INJECTION INTRAMUSCULAR; INTRAVENOUS AS NEEDED
Status: DISCONTINUED | OUTPATIENT
Start: 2017-05-12 | End: 2017-05-12 | Stop reason: HOSPADM

## 2017-05-12 RX ORDER — DEXAMETHASONE SODIUM PHOSPHATE 10 MG/ML
10 INJECTION INTRAMUSCULAR; INTRAVENOUS ONCE
Status: ACTIVE | OUTPATIENT
Start: 2017-05-13 | End: 2017-05-13

## 2017-05-12 RX ORDER — PANTOPRAZOLE SODIUM 40 MG/1
40 TABLET, DELAYED RELEASE ORAL
Status: DISCONTINUED | OUTPATIENT
Start: 2017-05-13 | End: 2017-05-22 | Stop reason: HOSPADM

## 2017-05-12 RX ORDER — ACETAMINOPHEN 325 MG/1
650 TABLET ORAL
Status: DISCONTINUED | OUTPATIENT
Start: 2017-05-12 | End: 2017-05-22 | Stop reason: HOSPADM

## 2017-05-12 RX ORDER — OXYCODONE HYDROCHLORIDE 5 MG/1
5-10 TABLET ORAL
Qty: 90 TAB | Refills: 0 | OUTPATIENT
Start: 2017-05-12 | End: 2017-05-22

## 2017-05-12 RX ORDER — PROPOFOL 10 MG/ML
INJECTION, EMULSION INTRAVENOUS AS NEEDED
Status: DISCONTINUED | OUTPATIENT
Start: 2017-05-12 | End: 2017-05-12 | Stop reason: HOSPADM

## 2017-05-12 RX ORDER — ACETAMINOPHEN 325 MG/1
650 TABLET ORAL EVERY 6 HOURS
Status: DISCONTINUED | OUTPATIENT
Start: 2017-05-12 | End: 2017-05-22 | Stop reason: HOSPADM

## 2017-05-12 RX ORDER — SODIUM CHLORIDE, SODIUM LACTATE, POTASSIUM CHLORIDE, CALCIUM CHLORIDE 600; 310; 30; 20 MG/100ML; MG/100ML; MG/100ML; MG/100ML
INJECTION, SOLUTION INTRAVENOUS
Status: DISCONTINUED | OUTPATIENT
Start: 2017-05-12 | End: 2017-05-12 | Stop reason: HOSPADM

## 2017-05-12 RX ORDER — OXYCODONE HYDROCHLORIDE 5 MG/1
10 TABLET ORAL
Status: DISCONTINUED | OUTPATIENT
Start: 2017-05-12 | End: 2017-05-17

## 2017-05-12 RX ORDER — ALLOPURINOL 100 MG/1
100 TABLET ORAL DAILY
Status: DISCONTINUED | OUTPATIENT
Start: 2017-05-13 | End: 2017-05-22 | Stop reason: HOSPADM

## 2017-05-12 RX ORDER — ROCURONIUM BROMIDE 10 MG/ML
INJECTION, SOLUTION INTRAVENOUS AS NEEDED
Status: DISCONTINUED | OUTPATIENT
Start: 2017-05-12 | End: 2017-05-12 | Stop reason: HOSPADM

## 2017-05-12 RX ORDER — SODIUM CHLORIDE 0.9 % (FLUSH) 0.9 %
5-10 SYRINGE (ML) INJECTION AS NEEDED
Status: DISCONTINUED | OUTPATIENT
Start: 2017-05-12 | End: 2017-05-22 | Stop reason: HOSPADM

## 2017-05-12 RX ORDER — MIDAZOLAM HYDROCHLORIDE 1 MG/ML
INJECTION, SOLUTION INTRAMUSCULAR; INTRAVENOUS AS NEEDED
Status: DISCONTINUED | OUTPATIENT
Start: 2017-05-12 | End: 2017-05-12 | Stop reason: HOSPADM

## 2017-05-12 RX ORDER — HYDROMORPHONE HYDROCHLORIDE 1 MG/ML
0.5 INJECTION, SOLUTION INTRAMUSCULAR; INTRAVENOUS; SUBCUTANEOUS
Status: ACTIVE | OUTPATIENT
Start: 2017-05-12 | End: 2017-05-13

## 2017-05-12 RX ORDER — SODIUM CHLORIDE 0.9 % (FLUSH) 0.9 %
5-10 SYRINGE (ML) INJECTION AS NEEDED
Status: DISCONTINUED | OUTPATIENT
Start: 2017-05-12 | End: 2017-05-12 | Stop reason: HOSPADM

## 2017-05-12 RX ORDER — SIMVASTATIN 40 MG/1
40 TABLET, FILM COATED ORAL
Status: DISCONTINUED | OUTPATIENT
Start: 2017-05-12 | End: 2017-05-13

## 2017-05-12 RX ORDER — DILTIAZEM HYDROCHLORIDE 120 MG/1
120 CAPSULE, COATED, EXTENDED RELEASE ORAL DAILY
Status: DISCONTINUED | OUTPATIENT
Start: 2017-05-13 | End: 2017-05-22 | Stop reason: HOSPADM

## 2017-05-12 RX ORDER — VANCOMYCIN 2 GRAM/500 ML IN 0.9 % SODIUM CHLORIDE INTRAVENOUS
2000 EVERY 12 HOURS
Status: COMPLETED | OUTPATIENT
Start: 2017-05-12 | End: 2017-05-12

## 2017-05-12 RX ORDER — SODIUM CHLORIDE 9 MG/ML
250 INJECTION, SOLUTION INTRAVENOUS AS NEEDED
Status: DISCONTINUED | OUTPATIENT
Start: 2017-05-12 | End: 2017-05-12 | Stop reason: HOSPADM

## 2017-05-12 RX ORDER — PREGABALIN 75 MG/1
75 CAPSULE ORAL ONCE
Status: COMPLETED | OUTPATIENT
Start: 2017-05-12 | End: 2017-05-12

## 2017-05-12 RX ORDER — ACETAMINOPHEN 325 MG/1
650 TABLET ORAL ONCE
Status: DISCONTINUED | OUTPATIENT
Start: 2017-05-12 | End: 2017-05-12 | Stop reason: HOSPADM

## 2017-05-12 RX ORDER — HYDROMORPHONE HYDROCHLORIDE 2 MG/ML
INJECTION, SOLUTION INTRAMUSCULAR; INTRAVENOUS; SUBCUTANEOUS AS NEEDED
Status: DISCONTINUED | OUTPATIENT
Start: 2017-05-12 | End: 2017-05-12 | Stop reason: HOSPADM

## 2017-05-12 RX ORDER — AMOXICILLIN 250 MG
1 CAPSULE ORAL 2 TIMES DAILY
Status: DISCONTINUED | OUTPATIENT
Start: 2017-05-12 | End: 2017-05-17

## 2017-05-12 RX ORDER — ONDANSETRON 2 MG/ML
4 INJECTION INTRAMUSCULAR; INTRAVENOUS
Status: ACTIVE | OUTPATIENT
Start: 2017-05-12 | End: 2017-05-13

## 2017-05-12 RX ORDER — OXYCODONE HYDROCHLORIDE 5 MG/1
5 TABLET ORAL
Status: DISCONTINUED | OUTPATIENT
Start: 2017-05-12 | End: 2017-05-17

## 2017-05-12 RX ORDER — SODIUM CHLORIDE, SODIUM LACTATE, POTASSIUM CHLORIDE, CALCIUM CHLORIDE 600; 310; 30; 20 MG/100ML; MG/100ML; MG/100ML; MG/100ML
25 INJECTION, SOLUTION INTRAVENOUS CONTINUOUS
Status: DISCONTINUED | OUTPATIENT
Start: 2017-05-12 | End: 2017-05-12 | Stop reason: HOSPADM

## 2017-05-12 RX ORDER — URSODIOL 300 MG/1
300 CAPSULE ORAL 2 TIMES DAILY
Status: DISCONTINUED | OUTPATIENT
Start: 2017-05-12 | End: 2017-05-22 | Stop reason: HOSPADM

## 2017-05-12 RX ORDER — CELECOXIB 200 MG/1
200 CAPSULE ORAL ONCE
Status: DISCONTINUED | OUTPATIENT
Start: 2017-05-12 | End: 2017-05-12 | Stop reason: HOSPADM

## 2017-05-12 RX ORDER — SODIUM CHLORIDE 0.9 % (FLUSH) 0.9 %
5-10 SYRINGE (ML) INJECTION EVERY 8 HOURS
Status: DISCONTINUED | OUTPATIENT
Start: 2017-05-12 | End: 2017-05-12 | Stop reason: HOSPADM

## 2017-05-12 RX ORDER — PHENOL/SODIUM PHENOLATE
20 AEROSOL, SPRAY (ML) MUCOUS MEMBRANE DAILY
Status: DISCONTINUED | OUTPATIENT
Start: 2017-05-13 | End: 2017-05-12 | Stop reason: CLARIF

## 2017-05-12 RX ORDER — SODIUM CHLORIDE 9 MG/ML
25 INJECTION, SOLUTION INTRAVENOUS CONTINUOUS
Status: DISCONTINUED | OUTPATIENT
Start: 2017-05-12 | End: 2017-05-12 | Stop reason: HOSPADM

## 2017-05-12 RX ORDER — SODIUM CHLORIDE 9 MG/ML
INJECTION, SOLUTION INTRAVENOUS
Status: DISCONTINUED | OUTPATIENT
Start: 2017-05-12 | End: 2017-05-12 | Stop reason: HOSPADM

## 2017-05-12 RX ORDER — POLYETHYLENE GLYCOL 3350 17 G/17G
17 POWDER, FOR SOLUTION ORAL DAILY
Status: DISCONTINUED | OUTPATIENT
Start: 2017-05-13 | End: 2017-05-17

## 2017-05-12 RX ORDER — DEXAMETHASONE SODIUM PHOSPHATE 4 MG/ML
4 INJECTION, SOLUTION INTRA-ARTICULAR; INTRALESIONAL; INTRAMUSCULAR; INTRAVENOUS; SOFT TISSUE ONCE
Status: DISCONTINUED | OUTPATIENT
Start: 2017-05-12 | End: 2017-05-12 | Stop reason: HOSPADM

## 2017-05-12 RX ORDER — FACIAL-BODY WIPES
10 EACH TOPICAL DAILY PRN
Status: DISCONTINUED | OUTPATIENT
Start: 2017-05-14 | End: 2017-05-22 | Stop reason: HOSPADM

## 2017-05-12 RX ORDER — LANOLIN ALCOHOL/MO/W.PET/CERES
325 CREAM (GRAM) TOPICAL
Status: DISCONTINUED | OUTPATIENT
Start: 2017-05-12 | End: 2017-05-22 | Stop reason: HOSPADM

## 2017-05-12 RX ORDER — HYDROMORPHONE HYDROCHLORIDE 1 MG/ML
0.2 INJECTION, SOLUTION INTRAMUSCULAR; INTRAVENOUS; SUBCUTANEOUS
Status: DISCONTINUED | OUTPATIENT
Start: 2017-05-12 | End: 2017-05-12 | Stop reason: HOSPADM

## 2017-05-12 RX ORDER — OXYCODONE HYDROCHLORIDE 5 MG/1
5 TABLET ORAL AS NEEDED
Status: DISCONTINUED | OUTPATIENT
Start: 2017-05-12 | End: 2017-05-12 | Stop reason: HOSPADM

## 2017-05-12 RX ADMIN — KETAMINE HYDROCHLORIDE 10 MG: 10 INJECTION, SOLUTION INTRAMUSCULAR; INTRAVENOUS at 11:29

## 2017-05-12 RX ADMIN — ACETAMINOPHEN 650 MG: 325 TABLET, FILM COATED ORAL at 19:14

## 2017-05-12 RX ADMIN — SODIUM CHLORIDE 125 ML/HR: 900 INJECTION, SOLUTION INTRAVENOUS at 14:48

## 2017-05-12 RX ADMIN — ROCURONIUM BROMIDE 20 MG: 10 INJECTION, SOLUTION INTRAVENOUS at 10:48

## 2017-05-12 RX ADMIN — Medication 325 MG: at 21:42

## 2017-05-12 RX ADMIN — HYDROMORPHONE HYDROCHLORIDE 0.5 MG: 2 INJECTION, SOLUTION INTRAMUSCULAR; INTRAVENOUS; SUBCUTANEOUS at 10:06

## 2017-05-12 RX ADMIN — MIDAZOLAM HYDROCHLORIDE 3 MG: 1 INJECTION, SOLUTION INTRAMUSCULAR; INTRAVENOUS at 10:02

## 2017-05-12 RX ADMIN — SIMVASTATIN 40 MG: 40 TABLET, FILM COATED ORAL at 21:42

## 2017-05-12 RX ADMIN — SODIUM CHLORIDE: 9 INJECTION, SOLUTION INTRAVENOUS at 10:25

## 2017-05-12 RX ADMIN — ALBUMIN HUMAN 250 ML: 50 SOLUTION INTRAVENOUS at 13:44

## 2017-05-12 RX ADMIN — VANCOMYCIN HYDROCHLORIDE 2000 MG: 10 INJECTION, POWDER, LYOPHILIZED, FOR SOLUTION INTRAVENOUS at 19:28

## 2017-05-12 RX ADMIN — RIFAXIMIN 550 MG: 550 TABLET ORAL at 21:41

## 2017-05-12 RX ADMIN — ALBUMIN HUMAN 250 ML: 50 SOLUTION INTRAVENOUS at 10:50

## 2017-05-12 RX ADMIN — PREGABALIN 75 MG: 75 CAPSULE ORAL at 08:09

## 2017-05-12 RX ADMIN — KETAMINE HYDROCHLORIDE 60 MG: 10 INJECTION, SOLUTION INTRAMUSCULAR; INTRAVENOUS at 10:15

## 2017-05-12 RX ADMIN — WARFARIN SODIUM 5 MG: 5 TABLET ORAL at 19:15

## 2017-05-12 RX ADMIN — SODIUM CHLORIDE, SODIUM LACTATE, POTASSIUM CHLORIDE, CALCIUM CHLORIDE: 600; 310; 30; 20 INJECTION, SOLUTION INTRAVENOUS at 10:02

## 2017-05-12 RX ADMIN — ROCURONIUM BROMIDE 40 MG: 10 INJECTION, SOLUTION INTRAVENOUS at 10:15

## 2017-05-12 RX ADMIN — PROPOFOL 150 MG: 10 INJECTION, EMULSION INTRAVENOUS at 10:15

## 2017-05-12 RX ADMIN — ONDANSETRON 4 MG: 2 INJECTION INTRAMUSCULAR; INTRAVENOUS at 13:15

## 2017-05-12 RX ADMIN — NEOSTIGMINE METHYLSULFATE 1 MG: 1 INJECTION INTRAVENOUS at 13:33

## 2017-05-12 RX ADMIN — HYDROMORPHONE HYDROCHLORIDE 0.5 MG: 2 INJECTION, SOLUTION INTRAMUSCULAR; INTRAVENOUS; SUBCUTANEOUS at 10:02

## 2017-05-12 RX ADMIN — URSODIOL 300 MG: 300 CAPSULE ORAL at 19:14

## 2017-05-12 RX ADMIN — VANCOMYCIN HYDROCHLORIDE 2000 MG: 10 INJECTION, POWDER, LYOPHILIZED, FOR SOLUTION INTRAVENOUS at 08:15

## 2017-05-12 RX ADMIN — KETOROLAC TROMETHAMINE 30 MG: 30 INJECTION, SOLUTION INTRAMUSCULAR at 19:15

## 2017-05-12 RX ADMIN — LACTULOSE 10 G: 10 SOLUTION ORAL at 19:24

## 2017-05-12 RX ADMIN — LIDOCAINE HYDROCHLORIDE 0.1 ML: 10 INJECTION, SOLUTION EPIDURAL; INFILTRATION; INTRACAUDAL; PERINEURAL at 08:01

## 2017-05-12 RX ADMIN — GLYCOPYRROLATE 0.2 MG: 0.2 INJECTION INTRAMUSCULAR; INTRAVENOUS at 13:33

## 2017-05-12 RX ADMIN — METOPROLOL TARTRATE 25 MG: 25 TABLET ORAL at 21:42

## 2017-05-12 RX ADMIN — ROCURONIUM BROMIDE 10 MG: 10 INJECTION, SOLUTION INTRAVENOUS at 10:25

## 2017-05-12 RX ADMIN — DOCUSATE SODIUM AND SENNOSIDES 1 TABLET: 8.6; 5 TABLET, FILM COATED ORAL at 19:15

## 2017-05-12 RX ADMIN — SODIUM CHLORIDE, SODIUM LACTATE, POTASSIUM CHLORIDE, AND CALCIUM CHLORIDE 25 ML/HR: 600; 310; 30; 20 INJECTION, SOLUTION INTRAVENOUS at 08:01

## 2017-05-12 RX ADMIN — RIFAXIMIN 550 MG: 550 TABLET ORAL at 19:25

## 2017-05-12 NOTE — BRIEF OP NOTE
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  Postoperative Diagnosis: STATUS POST RIGHT TOTAL KNEE REPLACEMENT    Procedure(s):  RIGHT TOTAL KNEE REVISION WITH HARDWARE REMOVAL  Surgeon(s) and Role:     * Argenis Ambrocio MD - Primary         Assistant Staff:  Physician Assistant: Vanessa Arzola PA-C    Surgical Staff:  Circ-1: Sharyle Erps, RN  Circ-Relief: Aníbal Quan RN  Physician Assistant: Vanessa Arzola PA-C  Scrub RN-1: Bryn Conde RN  Surg Asst-1: [place] List  Surg Asst-Relief: Daniellindaya Allen  Event Time In   Incision Start 1050   Incision Close      Anesthesia: Spinal   Estimated Blood Loss: 150 mL  Specimens: * No specimens in log *   Findings: prostalac antibiotic spacer s/p right total knee infection  Complications: none  Implants:   Implant Name Type Inv.  Item Serial No.  Lot No. LRB No. Used Action   CEMENT BNE GENTAMC GHV 40GM -- SMARTSET - SNA  CEMENT BNE GENTAMC GHV 40GM -- SMARTSET NA [facility] [employer] [id number] Right 2 Implanted   CEMENT BNE GENTAMC GHV 40GM -- SMARTSET - SNA  CEMENT BNE GENTAMC GHV 40GM -- SMARTSET NA [facility] [employer] [id number] Right 2 Implanted   SPACER TIB AUG NXGN SZ 6 10MM --  - SNA  SPACER TIB AUG NXGN SZ 6 10MM --  NA [employer] [id number] Right 1 Implanted   SPACER TIB AUG NXGN SZ 6 10MM --  - SNA  SPACER TIB AUG NXGN SZ 6 10MM --  NA [employer] [id number] Right 1 Implanted   Stem Extension, Offset, Long, 15mm diameter, 155mm length Joint Component  [employer] [id number] Right 1 Implanted   Tibial Cone, medium, 46mm x 34mm, 25mm height Joint Component  [employer] [id number] Right 1 Implanted   PLATE TIB WDGD NXGN PC 6 TIV --  - SNA  PLATE TIB WDGD NXGN PC 6 TIV --  NA [employer] [id number] Right 1 Implanted   SPACER FEM POST PC NXGN SZ G --  - SNA  SPACER FEM POST PC NXGN SZ G --  NA [employer] [id number] Right 1 Implanted   SPACER FEM POST PC NXGN SZ G --  - SNA  SPACER FEM POST PC NXGN SZ G --  [employer] [id number] Right 1 Implanted   Stem Extension, straight, long, 17mm diameter, 155mm length Joint Component  [employer] [id number] Right 1 Implanted   BLOCK FEM AUG DST PC G 5MM --  - SNA  BLOCK FEM AUG DST PC G 5MM --  [employer] [id number] Right 1 Implanted   COMPNT KNEE FEM LCCK SZ G RT --  - SNA  COMPNT KNEE FEM LCCK SZ G RT --  NA [employer] [id number] Right 1 Implanted

## 2017-05-12 NOTE — DISCHARGE INSTRUCTIONS
After Hospital Care Plan:  Discharge Instructions Knee Replacement  Dr. Jo Ann Brandt    Patient Name: Ayo Mcdaniels III  Date of procedure: 5/12/2017   Procedure: Procedure(s):  RIGHT TOTAL KNEE REVISION WITH HARDWARE REMOVAL  Surgeon: Amador Hernandez) and Role:     * Amos Hobbs MD - Primary  PCP: Marguerite Marie MD  Date of discharge: No discharge date for patient encounter. Follow up appointments   Follow up with Dr. Jo Ann Brandt in 3 weeks. Call [phone] to make an appointment.  If home health has been arranged for you the agency will contact you to arrange dates/times for visits. Please call them if you do not hear from them within 24 hours after you are discharged    When to call your Orthopaedic Surgeon: Call [phone]. If you call after 5pm or on a weekend, the on call physician will be contacted   Unrelieved pain   Signs of infection-if your incision is red; continues to have drainage; drainage has a foul odor or if you have a persistent fever over 101 degrees   Signs of a blood clot in your leg-calf pain, tenderness, redness, swelling of lower leg    When to call your Primary Care Physician:   Concerns about medical conditions such as diabetes, high blood pressure, asthma, congestive heart failure   Call if blood sugars are elevated, persistent headache or dizziness, coughing or congestion, constipation or diarrhea, burning with urination, abnormal heart rate    When to call [id number] go to the nearest emergency room   Acute onset of chest pain, shortness of breath, difficulty breathing    Activity   Weight bearing as tolerated with walker or crutches.  Refer to pages 23-31 of your handbook for instructions and pictures   Complete your Home Exercise Program daily as instructed by your therapist.  Refer to pages 33-41 of your handbook for instructions and pictures   Get up every one hour and walk (except at night when sleeping)   Do not drive or operate heavy machinery    Incision Care   The Aquacel (brown, waterproof) surgical dressing is to remain on your knee for 7 days. On the 7th day have someone gently peel the dressing off by carefully lifting the edge and stretching it slightly to break the adhesive seal and leave incision open to air. You may take a shower with the Aquacel dressing in place.  You have staples in your knee incision; they will be removed by the [address] [place] [address] [facility] staff in 10-14 days and steri-strips will be applied. Leave the steri-strips on until they fall off      Preventing blood clots    Take Coumadin as prescribed  by Dr. Reji Moore for one month following surgery   Wear elastic stockings (TEDS) for 4 weeks. You may remove them for approximately 1 hour daily for showering/sponge bathing    Pain management   Take pain medication as prescribed; decrease the amount you use as your pain lessens   Avoid alcoholic beverages while taking pain medication   Do not take any over-the-counter medication for pain except Tylenol (acetaminophen)   Please be aware that many medications contain Tylenol. We do not want you to over medicate so please read the information below as a guide. Do not take more than 4 Grams of Tylenol in a 24 hour period. (There are 1000 milligrams in one Gram)  o Percocet contains 325 mg of Tylenol per tablet (do not take more than 12 tablets in 24 hours)  o Lortab contains 500 mg of Tylenol per tablet (do not take more than 8 tablets in 24 hours)  o Norco contains 325 mg of Tylenol per tablet (do not take more than 12 tablets in 24 hours).  You may place an ice bag on your knee for 15-20 minutes after exercising and as needed throughout the day and night    Diet   Resume usual diet; drink plenty of fluids; eat foods high in fiber   You may want to take a stool softener (such as Senokot-S or Colace) to prevent constipation while you are taking pain medication.   If constipation occurs, take a laxative (such as Dulcolax tablets, Milk of Magnesia, or a suppository)    [facility] Protocol (to be followed by [facility])    Nursing-per physicians order  Roxy Darielo 20 a PT/INR per physicians order and call results to Dr. Wilton Cronin at [phone], extension [phone]    Remove staples on Thursday 5/25/17 and apply steri-strips   Complete head to toe assessment, vital signs   Medication reconciliation   Review pain management   Manage chronic medical conditions    Physical Therapy-per physicians order    Weight bearing status:             Mobility Status:                Gait:                ADL status overall composite:                   Physical Therapy   Assessment and evaluation-bed mobility; functional transfers (bed, chair, bathroom, stairs); ambulation with equipment, car transfers, shower transfers, safety and ability to get out of house in the event of an emergency       ** KNEE IMMOBILIZER x 1 WEEK - GENTLE RANGE OF MOTION UNTIL AFTER PATIENT SEEN IN THE OFFICE ON 5/30/17**   Review and maintain weight bearing as tolerated   Discuss pain management   Review how to do ADLs. Refer to page 42 of patient handbook    Home Exercise Program-refer to pages 33-41 of the patient handbook for exercises         Discharge Instructions       PATIENT ID: Екатерина Fall III  MRN: [id number]   [facility]: 1955    DATE OF ADMISSION: 5/12/2017  6:42 AM    DATE OF DISCHARGE: 5/22/2017    PRIMARY CARE PROVIDER: Mac Hernandez MD     ATTENDING PHYSICIAN: Naya Garnica MD  DISCHARGING PROVIDER: Edger Schlatter, MD    To contact this individual call [phone] and ask the  to page. If unavailable ask to be transferred the Adult Hospitalist Department.     DISCHARGE DIAGNOSES  See dc note    CONSULTATIONS: IP CONSULT TO HOSPITALIST  IP CONSULT TO NEPHROLOGY    PROCEDURES/SURGERIES: Procedure(s):  REMOVAL OF PROSTALAC SPACER, RIGHT TOTAL KNEE REVISION    PENDING TEST RESULTS:   At the time of discharge the following test results are still pending: na    FOLLOW UP APPOINTMENTS:   Follow-up Information     Follow up With Details Comments [address] [place] [facility]   [phone]    Susana Velez MD On 5/22/2017 For discharge follow up at 10:30AM  [facility] Ronald Oliva MD In 1 week  [address]      Jadyn Linda MD In 1 week  [facility] [address] [phone]             ADDITIONAL CARE RECOMMENDATIONS: na    DIET: Cardiac Diet       ACTIVITY: Activity as tolerated    WOUND CARE: as directed by orthopedics    EQUIPMENT needed: na      DISCHARGE MEDICATIONS:   See Medication Reconciliation Form    · It is important that you take the medication exactly as they are prescribed. · Keep your medication in the bottles provided by the pharmacist and keep a list of the medication names, dosages, and times to be taken in your wallet. · Do not take other medications without consulting your doctor. NOTIFY YOUR PHYSICIAN FOR ANY OF THE FOLLOWING:   Fever over 101 degrees for 24 hours. Chest pain, shortness of breath, fever, chills, nausea, vomiting, diarrhea, change in mentation, falling, weakness, bleeding. Severe pain or pain not relieved by medications. Or, any other signs or symptoms that you may have questions about.       DISPOSITION:    Home With:   OT  PT  HH  RN       SNF/Inpatient Rehab/LTAC   x Independent/assisted living    Hospice    Other:           Signed:   Andrés Meyer MD  5/22/2017  2:06 PM

## 2017-05-12 NOTE — OP NOTES
[address]   [address] [place] [address], [address]   OP NOTE       Name:  Zane Brooks   MR#:  [id number]   :  1955   Account #:  [de-identified]    Surgery Date:  2017   Date of Adm:  2017       PREOPERATIVE DIAGNOSES   1. History of right knee replacement. 2. Implant loosening, right knee. POSTOPERATIVE DIAGNOSES   1. History of right knee replacement. 2. Implant loosening, right knee. PROCEDURES PERFORMED: Revision of femoral, tibial, and patellar   components. SURGEON: Gilmer Mcardle, MD     ASSISTANT: Keila Aguilar. Hemalatha Alegre PA-C     ANESTHESIA: General.    ESTIMATED BLOOD LOSS: 150 mL. DRAINS: None. COMPLICATIONS: None. SPECIMENS REMOVED: None. PREOPERATIVE ANTIBIOTIC: Ancef 2 grams. IMPLANT: Orion LCCK size G femur with +5 posterior augments, a   +5 distal medial augment, a size 6 tibia with large trabecular metal   sleeve, and double +10 augments. Both implants were stemmed. COUNTS: Sponge, instrument, and needle counts were correct at the   end of the procedure. INDICATIONS: A 77-year-old man who underwent removal of an   infected knee and placement of a temporary knee. We were leaving his   knee resected with a temporary knee for a significant time due to his   history of chronic liver disease and kidney disease. The patient began   experiencing increasing pain; and, after evaluation, we felt that his   implant was at this point giving him pain secondary to loosening. He   was ultimately stabilized from his liver and kidney issues and deemed   stable for surgery. DESCRIPTION OF PROCEDURE: Anesthetic was initiated. IV   antibiotic dose was given. The right side was confirmed as the   operative side, prepped and draped in the usual sterile fashion. The   limb was exsanguinated, tourniquet was inflated. A medial parapatellar approach using his old incision was made. Complete synovectomy was performed.  Medial and lateral gutters were   reconstituted. The polyethylene was removed and all cement was   removed from the tibia. The femur was exposed and the femoral   component was removed. The knee was exposed, and the medullary   canal of the femur was cleared out. The tibia was exposed. The   medullary canal of the tibia was cleared out. Reamed the tibia to 15   mm. Placed a trial stem. Broached for a large sleeve, and used the size   6 tibial component with 2+10 augments and a 15 stem. Trial was left in   place. Medullary canal of the femur was entered, reamed to 17. And   then, off of a 17 stem, prepared for a size G femoral component with a   +5 distal medial augment and two +5 posterior augments. At this point,   I trialed and the knee was well balanced with a 20 mm polyethylene. Patella was cut freehand. The patellar bone stock was assessed and a   patellar component trial was placed. [place] bearing was trialed and the   knee was stable and tracked well. Bent to about 85 degrees. Trials   were removed. Bony surfaces were lavaged and dried. All excess scar   and debris were removed down to normal-appearing bone. On the   tibial side, I impacted the sleeve and cemented the stem and tray   through the sleeve. This cement was allowed to fully cure. The rotation   of the implant was based on the tibial tubercle. After this cement had   fully cured, I cemented the femur in place and the patella in place. A 17   poly trial was left in place while the cement was hardening. After this   cement had fully cured, I trialed and decided on a 20 polyethylene,   placed a 20 LCCK polyethylene, tightened the screw, copiously   irrigated the deep wound, infiltrated the soft tissues with local   anesthetic, and closed the arthrotomy with #2 Vicryl sutures and 0   Vicryl sutures, watertight.  Skin and subcutaneous were irrigated and   closed in standard fashion, with the final skin closure distally with   horizontal mattress nylon sutures and proximally with staples. Sterile   dressing was applied. A medium Hemovac was left in the deep wound. Each wound layer was irrigated copiously prior to closure. The procedure was revision of femoral, tibial, and patellar components. Gilberto Mendes assisted for the procedure. There were no   complications. A Orion implant was used. The patient was taken to   the recovery room, stable.         Hu Lewis MD MD / Tammy Ayala   D:  05/12/2017   13:22   T:  05/12/2017   14:06   Job #:  [id number]

## 2017-05-12 NOTE — PROGRESS NOTES
TRANSFER - IN REPORT:    Verbal report received from Evangelina(name) on [employer]  being received from PACU(unit) for routine post - op      Report consisted of patients Situation, Background, Assessment and   Recommendations(SBAR). Information from the following report(s) SBAR, Kardex, Intake/Output, MAR and Recent Results was reviewed with the receiving nurse. Opportunity for questions and clarification was provided. Assessment completed upon patients arrival to unit and care assumed.

## 2017-05-12 NOTE — PROGRESS NOTES
TRANSFER - OUT REPORT:    Verbal report given to Roslyn SANTAMARIA(name) on [employer]  being transferred to 5S(unit) for routine post - op       Report consisted of patients Situation, Background, Assessment and   Recommendations(SBAR). Time Pre op antibiotic [id number]  Anesthesia Stop time: 8134  Echavarria Present on Transfer to floor:y  Order for Echavarria on Chart:y    Information from the following report(s) SBAR, Kardex, OR Summary, Procedure Summary, Intake/Output and MAR was reviewed with the receiving nurse. Opportunity for questions and clarification was provided. Is the patient on 02? NO        Is the patient on a monitor? NO    Is the nurse transporting with the patient? NO    Surgical Waiting Area notified of patient's transfer from PACU? YES      The following personal items collected during your admission accompanied patient upon transfer:   Dental Appliance: Dental Appliances: None  Vision: Visual Aid: Glasses  Hearing Aid:    Jewelry:    Clothing: Clothing:  At bedside, With patient (clothing bag, glasses, walker to pacu; immobilizer to OR w/pt)  Other Valuables:    Valuables sent to safe:

## 2017-05-12 NOTE — H&P
Aneudy January III  Location: [place] [address] [address]  Patient #: [id number]  : 1955   / Language: English / Race: White  Male      History of Present Illness   The patient is a 57year old male who presents for a recheck of Post-Operative. Patient is months (Resection of right total knee arthroplasty non 10/31/16) postop procedure. Patient's symptoms are slightly improved compared to preoperative. Post operative pain has been mild. No pain medications are being taken .  The patient is on the following DVT prophylaxis treatment: Coumadin .  Post operative treatment includes splint immobilization. Weight-bearing status: nonweight-bearing. Patient has been compliant with post operative instructions. Note for \"Post-Operative\": Patient presents today after finishing his IV antibiotics for an infected right total knee.  He has done extremely well.  His pain has resolved and swelling has resolved. Problem List/Past Medical   History of total right knee replacement (V43.65  Z96.651)   REVIEW OF SYSTEMS: Systems were reviewed by the provider.   Weight above 97th percentile (V49.89  Z78.9)   Essential Hypertension   Effusion of right knee (719.06  M25.461)   DIETARY COUNSELING (V65.3)   HAND, TENDONITIS (727.05)   Pain due to total left knee replacement (996.77  T84.84XA)   HAND, PAIN (719.43)   Gout (274.9  M10.9)     Allergies  Penicillins   Atropine *CHEMICALS*     Family History  Diabetes Mellitus   Arthritis   Hypercholesterolemia     Social History   Tobacco use  Never smoker. 2013: Never smoker. Seat Belt Use  : always  Illicit drug use  : none  Exercise  2013: Bicycles 3-4 times a week. Marcheta Amend Exposure  2013: occasionally  HIV risk factors  2013: no  Alcohol use  2013: Drinks liquor 2 times per week having 1-2 drinks per occasion, never having more than 5 drinks per occasion.   Caffeine use  2013: Drinks soft drinks 1-2 times a day.    Medication History   Cleocin  (300MG Capsule, 1 (one) Oral four times daily, take until finished, Taken starting 04/02/2014) Active. Colchicine  (0.6MG Tablet, 1 (one) Tablet Oral every 8 hours, as needed, Taken starting 10/19/2016) Active. Hydrocod Polst-CPM Polst ER  (10-8MG/5ML Liquid ER, Oral) Active. Lisinopril  (10MG Tablet, Oral) Active. MetFORMIN HCl ER  (500MG Tablet ER 24HR, Oral) Active. Omeprazole  (20MG Capsule DR, Oral) Active. Warfarin Sodium  (2.5MG Tablet, Oral) Active. Medications Reconciled     Past Surgical History  Total Knee Replacement  right  Arthroscopy of Knee  left  Carpal Tunnel Repair  right    Diagnostic Studies History   Knee X-ray  Date: 4/18/2013, Results: Knee x-rays today 3 views show good position of his total knee without issue. Other Problems  Unspecified Diagnosis   Unspecified Diagnosis   Blood Clot   Gout   Diabetes Mellitus         Review of Systems  General Present- Seasonal Allergies. Not Present- Appetite Loss, Chills, Fatigue, Fever, HIV Exposure, Night Sweats, Persistent Infections, Weight Gain and Weight Loss. Skin Not Present- Itching, Nail Changes, Poor Wound Healing, Rash, Skin Color Changes, Suspicious Lesions and Yellowish Skin Color. HEENT Not Present- Decreased Hearing, Earache, Hoarseness, Loose Teeth, Nose Bleed, Ringing in the Ears and Sore Throat. Respiratory Not Present- Bloody sputum, Chronic Cough, Difficulty Breathing, Snoring, Wakes up from Sleep Wheezing or Short of Breath and Wheezing. Cardiovascular Not Present- Bluish Discoloration Of Lips Or Nails, Chest Pain, Difficulty Breathing Lying Down, Difficulty Breathing On Exertion, Leg Cramps With Exertion, Palpitations and Swelling of Extremities. Gastrointestinal Not Present- Abdominal Pain, Black, Tarry Stool, Change in Bowel Habits, Cirrhosis, Constipation, Diarrhea, Difficulty Swallowing, Nausea and Vomiting.   Male Genitourinary Not Present- Blood in Urine, Frequency, Painful Urination, Pelvic Pain, Trouble Starting Urinary Stream and Urgency. Musculoskeletal Present- Joint Pain and Joint Swelling. Not Present- Back Pain and Joint Stiffness. Neurological Not Present- Fainting, Headaches, Memory Loss, Numbness, Seizures, Tingling, Tremor, Unsteadiness and Weakness. Psychiatric Not Present- Anxiety, Bipolar and Depression. Endocrine Not Present- Cold Intolerance, Excessive Hunger, Excessive Thirst, Excessive Urination and Heat Intolerance. Vitals  Weight: 300 lb   Height: 74 in   Weight was reported by patient. Height was reported by patient. Body Surface Area: 2.58 m²   Body Mass Index: 38.52 kg/m²                Physical Exam  Musculoskeletal  Global Assessment  Right Lower Extremity - Note: Right knee exam shows well-healed incision. Range of motion is about . Assessment & Plan   REVIEW OF SYSTEMS: Systems were reviewed by the provider.(V49.9)  Current Plans  Pt Education - How to access health information online: discussed with patient and provided information. Pt Education - Educational materials were provided.: discussed with patient and provided information. History of total right knee replacement (V43.65  Z96.651)  Impression: Good progress with his treatment. At this point plan is for him to have a him back free interval and had his knee reimplanted 90 days after the initial takeout. Surgery was scheduled.

## 2017-05-12 NOTE — IP AVS SNAPSHOT
[address] 
[phone] Patient: Srini Duran 
MRN: [id number] TFH:2/14/2083 You are allergic to the following Allergen Reactions Pcn (Penicillins) Anaphylaxis Shortness of Breath Atropine Other (comments) UNSURE OF REACTION, 50 YRS AGO Recent Documentation Height Weight BMI Smoking Status 1.854 m 127 kg 36.94 kg/m2 Never Smoker Emergency Contacts Name Discharge Info Relation Home Work Mobile [address] CAREGIVER [3] Spouse [3] [phone] About your hospitalization You were admitted on:  May 12, 2017 You last received care in the:  [address] [place] [address] You were discharged on:  May 22, 2017 Unit phone number:  [phone] Why you were hospitalized 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 
[phone] Russel Galindo MD In 1 week  [facility] [address] [facility] [address] 
[phone] Neil Fernández MD In 1 week  [address] [place] [address] [address] [facility] 7 [address] 
[phone] Your Appointments Tuesday May 23, 2017  8:30 AM EDT  
PT EVALUATION with DEWEY Fraga 39 ([address]) [place] [address] ([address] [facility]) Tuesday May 23, 2017 To Be Determined START OF CARE with HINA Madsen 39 ([address]) Francisco 39 ([address] [facility]) Current Discharge Medication List  
  
START taking these medications Dose & Instructions Dispensing Information Comments Morning Noon Evening Bedtime  
 oxyCODONE IR 5 mg immediate release tablet Commonly known as:  Ranjeet Longs Your last dose was: Your next dose is:    
   
   
 Dose:  5-10 mg Take 1-2 Tabs by mouth every four (4) hours as needed for Pain. Max Daily Amount: 60 mg.  
 Quantity:  90 Tab Refills:  0 CONTINUE these medications which have CHANGED Dose & Instructions Dispensing Information Comments Morning Noon Evening Bedtime  
 furosemide 40 mg tablet Commonly known as:  LASIX What changed:   
- how much to take 
- how to take this - when to take this 
- additional instructions Your last dose was: Your next dose is: One tablet 2 times a day Quantity:  60 Tab Refills:  0  
     
   
   
   
  
 simvastatin 10 mg tablet Commonly known as:  ZOCOR What changed:   
- medication strength 
- how much to take Your last dose was: Your next dose is:    
   
   
 Dose:  10 mg Take 1 Tab by mouth nightly. Quantity:  30 Tab Refills:  0  
     
   
   
   
  
 warfarin 5 mg tablet Commonly known as:  COUMADIN What changed:   
- how much to take 
- how to take this - when to take this 
- additional instructions Your last dose was: Your next dose is: Take one on M, W, F,  Take 1/2 of a tablet on Tuesday. , Thursday, and Saturday;  and none on Sunday. ( note:  no coumadin today 5/22) Quantity:  30 Tab Refills:  0 CONTINUE these medications which have NOT CHANGED Dose & Instructions Dispensing Information Comments Morning Noon Evening Bedtime  
 allopurinol 100 mg tablet Commonly known as:  Geremias Hunterranjeet Your last dose was: Your next dose is:    
   
   
 Dose:  100 mg Take 100 mg by mouth daily. Refills:  0  
     
   
   
   
  
 dilTIAZem  mg tablet Commonly known as:  CARDIZEM LA Your last dose was: Your next dose is:    
   
   
 Dose:  120 mg Take 120 mg by mouth every morning. Refills:  0  
     
   
   
   
  
 lactulose 10 gram/15 mL solution Commonly known as:  Ebbie Pander Your last dose was: Your next dose is: Take  by mouth three (3) times daily. 2 table spoons 3 times per day as needed Refills:  0  
     
   
   
   
  
 metoprolol tartrate 25 mg tablet Commonly known as:  LOPRESSOR Your last dose was: Your next dose is:    
   
   
 Dose:  25 mg Take 25 mg by mouth nightly. Refills:  0 Omeprazole delayed release 20 mg tablet Commonly known as:  PRILOSEC D/R Your last dose was: Your next dose is:    
   
   
 Dose:  20 mg Take 20 mg by mouth daily. Refills:  0 SLOW  mg (45 mg iron) ER tablet Generic drug:  ferrous sulfate Your last dose was: Your next dose is: Take  by mouth nightly. Refills:  0  
     
   
   
   
  
 ursodiol 300 mg capsule Commonly known as:  ACTIGALL Your last dose was: Your next dose is:    
   
   
 Dose:  300 mg Take 300 mg by mouth two (2) times a day. Refills:  0  
     
   
   
   
  
 XIFAXAN 550 mg tablet Generic drug:  rifAXIMin Your last dose was: Your next dose is:    
   
   
 Dose:  550 mg Take 550 mg by mouth three (3) times daily. Indications: HEPATIC ENCEPHALOPATHY Refills:  0 Where to Get Your Medications These medications were sent to [address]  [address] [place] [address], [address] [place] [address] Phone:  [phone]  
  furosemide 40 mg tablet  
 simvastatin 10 mg tablet  
 warfarin 5 mg tablet Information on where to get these meds will be given to you by the nurse or doctor. ! Ask your nurse or doctor about these medications  
  oxyCODONE IR 5 mg immediate release tablet Discharge Instructions After Hospital Care Plan:  Discharge Instructions Knee Replacement Dr. Andrea Sevilla Patient Name: Joseph Jefferson III Date of procedure: 5/12/2017 Procedure: Procedure(s): RIGHT TOTAL KNEE REVISION WITH HARDWARE REMOVAL Surgeon: Surgeon(s) and Role: Evan Castillo MD - Primary PCP: Karina Breaux MD 
Date of discharge: No discharge date for patient encounter. Follow up appointments ? Follow up with Dr. Andrea Sevilla in 3 weeks. Call [phone] to make an appointment. ? If home health has been arranged for you the agency will contact you to arrange dates/times for visits. Please call them if you do not hear from them within 24 hours after you are discharged When to call your Orthopaedic Surgeon: Call [phone]. If you call after 5pm or on a weekend, the on call physician will be contacted ? Unrelieved pain ? Signs of infection-if your incision is red; continues to have drainage; drainage has a foul odor or if you have a persistent fever over 101 degrees ? Signs of a blood clot in your leg-calf pain, tenderness, redness, swelling of lower leg When to call your Primary Care Physician: 
? Concerns about medical conditions such as diabetes, high blood pressure, asthma, congestive heart failure ? Call if blood sugars are elevated, persistent headache or dizziness, coughing or congestion, constipation or diarrhea, burning with urination, abnormal heart rate When to call 911and go to the nearest emergency room ? Acute onset of chest pain, shortness of breath, difficulty breathing Activity ? Weight bearing as tolerated with walker or crutches. Refer to pages 23-31 of your handbook for instructions and pictures ? Complete your Home Exercise Program daily as instructed by your therapist.  Refer to pages 33-41 of your handbook for instructions and pictures ? Get up every one hour and walk (except at night when sleeping) ? Do not drive or operate heavy machinery Incision Care ? The Aquacel (brown, waterproof) surgical dressing is to remain on your knee for 7 days. On the 7th day have someone gently peel the dressing off by carefully lifting the edge and stretching it slightly to break the adhesive seal and leave incision open to air. You may take a shower with the Aquacel dressing in place. ? You have staples in your knee incision; they will be removed by the Home Health staff in 10-14 days and steri-strips will be applied. Leave the steri-strips on until they fall off Preventing blood clots ? Take Coumadin as prescribed  by Dr. Sam Mckenna for one month following surgery ? Wear elastic stockings (TEDS) for 4 weeks. You may remove them for approximately 1 hour daily for showering/sponge bathing Pain management ? Take pain medication as prescribed; decrease the amount you use as your pain lessens ? Avoid alcoholic beverages while taking pain medication ? Do not take any over-the-counter medication for pain except Tylenol (acetaminophen) ? Please be aware that many medications contain Tylenol. We do not want you to over medicate so please read the information below as a guide. Do not take more than 4 Grams of Tylenol in a 24 hour period. (There are 1000 milligrams in one Gram) o Percocet contains 325 mg of Tylenol per tablet (do not take more than 12 tablets in 24 hours) 
o Lortab contains 500 mg of Tylenol per tablet (do not take more than 8 tablets in 24 hours) o Norco contains 325 mg of Tylenol per tablet (do not take more than 12 tablets in 24 hours). ? You may place an ice bag on your knee for 15-20 minutes after exercising and as needed throughout the day and night Diet ? Resume usual diet; drink plenty of fluids; eat foods high in fiber ? You may want to take a stool softener (such as Senokot-S or Colace) to prevent constipation while you are taking pain medication. If constipation occurs, take a laxative (such as Dulcolax tablets, Milk of Magnesia, or a suppository) Home Health Care Protocol (to be followed by [address]) Nursing-per physicians order ? Draw a PT/INR per physicians order and call results to Dr. Mili Eric at [phone], extension [phone] ? Remove staples on Thursday 5/25/17 and apply steri-strips ? Complete head to toe assessment, vital signs ? Medication reconciliation ? Review pain management ? Manage chronic medical conditions Physical Therapy-per physicians order Weight bearing status: 
  
  
  
 
Mobility Status: 
  
  
  
  
 
Gait: 
  
  
  
  
 
ADL status overall composite: 
  
  
  
  
  
 
Physical Therapy ? Assessment and evaluation-bed mobility; functional transfers (bed, chair, bathroom, stairs); ambulation with equipment, car transfers, shower transfers, safety and ability to get out of house in the event of an emergency ** KNEE IMMOBILIZER x 1 WEEK - GENTLE RANGE OF MOTION UNTIL AFTER PATIENT SEEN IN THE OFFICE ON 5/30/17** 
? Review and maintain weight bearing as tolerated ? Discuss pain management ? Review how to do ADLs. Refer to page 42 of patient handbook Home Exercise Program-refer to pages 33-41 of the patient handbook for exercises Discharge Instructions PATIENT ID: Edgar Velazquez III 
MRN: [id number] YOB: 1955 DATE OF ADMISSION: 5/12/2017  6:42 AM   
DATE OF DISCHARGE: 5/22/2017 PRIMARY CARE PROVIDER: Alisa West MD  
 
 ATTENDING PHYSICIAN: Phan Emmanuel MD 
DISCHARGING PROVIDER: José Miguel Osborn MD   
To contact this individual call [phone] and ask the  to page. If unavailable ask to be transferred the Adult Hospitalist Department. DISCHARGE DIAGNOSES  See dc note CONSULTATIONS: IP CONSULT TO HOSPITALIST 
IP CONSULT TO NEPHROLOGY PROCEDURES/SURGERIES: Procedure(s): REMOVAL OF PROSTALAC SPACER, RIGHT TOTAL KNEE REVISION PENDING TEST RESULTS:  
At the time of discharge the following test results are still pending: na 
 
FOLLOW UP APPOINTMENTS:  
Follow-up Information Follow up With Details Comments Contact [address] Office   [phone] Wally Ugarte MD On 5/22/2017 For discharge follow up at 10:30AM  [address] [place] [address] 
[phone] Elli Singh MD In 1 week  [facility] [address] [facility] [address] 
[phone] Phan Emmanuel MD In 1 week  [address] [facility] [address] [facility] [address] 
[phone] ADDITIONAL CARE RECOMMENDATIONS: na 
 
DIET: Cardiac Diet ACTIVITY: Activity as tolerated WOUND CARE: as directed by orthopedics EQUIPMENT needed: na 
 
 
 
 
DISPOSITION: 
  Home With: 
 OT  PT  [facility]  RN  
  
 SNF/Inpatient Rehab/LTAC  
x Independent/assisted living Hospice Other:  
 
   
 
Signed:  
Yaya Stoner MD 
5/22/2017 
2:06 PM 
 
 
Discharge Orders None [employer] Announcement We are excited to announce that we are making your provider's discharge notes available to you in Bramasol. You will see these notes when they are completed and signed by the physician that discharged you from your recent hospital stay. If you have any questions or concerns about any information you see in Bramasol, please call the Health Information Department where you were seen or reach out to your Primary Care Provider for more information about your plan of care. Introducing [facility]! Dear Ángel Blanco: Thank you for requesting a Bramasol account. Our records indicate that you already have an active Bramasol account. You can access your account anytime at [email or web address] Did you know that you can access your hospital and ER discharge instructions at any time in Bramasol? You can also review all of your test results from your hospital stay or ER visit. Additional Information If you have questions, please visit the Frequently Asked Questions section of the Bramasol website at [email or web address] Remember, Bramasol is NOT to be used for urgent needs. For medical emergencies, dial 911. Now available from your iPhone and Android! General Information Please provide this summary of care documentation to your next provider. Patient Signature:  ____________________________________________________________ Date:  ____________________________________________________________  
  
Noelle De Santiago Provider Signature:  ____________________________________________________________ Date:  ____________________________________________________________

## 2017-05-12 NOTE — PERIOP NOTES
12: Dr Shady Dunbar and Dr Ren Aburto aware of blood bank status: having to send type/screen sample to [facility] because of number of antibodies and lack of blood availability today; Also aware of PT/INR=1.3. Await CBC/ Chemistry results;   1402: CBC, chemistry results to Dr Rue Kussmaul; orders rec'd;   1000: Talked w/ Blood bank ([facility]): re: Type/Crossmatch. Pink top tubes being sent to [place] for antibody testing, as there are many. Dr Rue Kussmaul aware of this and will talk w/ Dr Ren Aburto.

## 2017-05-12 NOTE — PROGRESS NOTES
Pharmacist Note  Warfarin Dosing  Consult provided for this 60 y.o. male to manage warfarin for A Fib; s/p ortho surgery    INR Goal: 2-3  Home dose: Warfarin 5 mg daily      Preop Dose: Dobz[place]- none    Drugs that may increase INR: None  Drugs that may decrease INR: None  Other current anticoagulants/ drugs that may increase bleeding risk: None  Risk factors: None  Daily INR ordered: YES    Recent Labs      05/12/17   0757  05/12/17   0751   HGB   --   9.8*   INR  1.3*   --        Date               INR                 Dose  5/12  1.3  5 mg    Assessment/ Plan: Will order warfarin 5 mg PO x 1 dose. Pharmacy will continue to monitor daily and adjust therapy as indicated.

## 2017-05-12 NOTE — ANESTHESIA PREPROCEDURE EVALUATION
Anesthetic History   No history of anesthetic complications            Review of Systems / Medical History  Patient summary reviewed, nursing notes reviewed and pertinent labs reviewed    Pulmonary  Within defined limits                 Neuro/Psych   Within defined limits           Cardiovascular            Dysrhythmias : sinus tachycardia           GI/Hepatic/Renal     GERD    Renal disease: CRI  Liver disease     Endo/Other    Diabetes    Obesity, arthritis and anemia     Other Findings            Physical Exam    Airway  Mallampati: II  TM Distance: > 6 cm  Neck ROM: normal range of motion   Mouth opening: Normal     Cardiovascular  Regular rate and rhythm,  S1 and S2 normal,  no murmur, click, rub, or gallop             Dental  No notable dental hx       Pulmonary  Breath sounds clear to auscultation               Abdominal  GI exam deferred       Other Findings            Anesthetic Plan    ASA: 3  Anesthesia type: general          Induction: Intravenous  Anesthetic plan and risks discussed with: Patient

## 2017-05-12 NOTE — IP AVS SNAPSHOT
Current Discharge Medication List  
  
START taking these medications Dose & Instructions Dispensing Information Comments Morning Noon Evening Bedtime  
 oxyCODONE IR 5 mg immediate release tablet Commonly known as:  Sherial Blade Your last dose was: Your next dose is:    
   
   
 Dose:  5-10 mg Take 1-2 Tabs by mouth every four (4) hours as needed for Pain. Max Daily Amount: 60 mg.  
 Quantity:  90 Tab Refills:  0 CONTINUE these medications which have CHANGED Dose & Instructions Dispensing Information Comments Morning Noon Evening Bedtime  
 furosemide 40 mg tablet Commonly known as:  LASIX What changed:   
- how much to take 
- how to take this - when to take this 
- additional instructions Your last dose was: Your next dose is: One tablet 2 times a day Quantity:  60 Tab Refills:  0  
     
   
   
   
  
 simvastatin 10 mg tablet Commonly known as:  ZOCOR What changed:   
- medication strength 
- how much to take Your last dose was: Your next dose is:    
   
   
 Dose:  10 mg Take 1 Tab by mouth nightly. Quantity:  30 Tab Refills:  0  
     
   
   
   
  
 warfarin 5 mg tablet Commonly known as:  COUMADIN What changed:   
- how much to take 
- how to take this - when to take this 
- additional instructions Your last dose was: Your next dose is: Take one on M, W, F,  Take 1/2 of a tablet on Tuesday. , Thursday, and Saturday;  and none on Sunday. ( note:  no coumadin today 5/22) Quantity:  30 Tab Refills:  0 CONTINUE these medications which have NOT CHANGED Dose & Instructions Dispensing Information Comments Morning Noon Evening Bedtime  
 allopurinol 100 mg tablet Commonly known as:  Alexandra Torres Your last dose was: Your next dose is:    
   
   
 Dose:  100 mg Take 100 mg by mouth daily. Refills:  0  
     
   
   
   
  
 dilTIAZem  mg tablet Commonly known as:  CARDIZEM LA Your last dose was: Your next dose is:    
   
   
 Dose:  120 mg Take 120 mg by mouth every morning. Refills:  0  
     
   
   
   
  
 lactulose 10 gram/15 mL solution Commonly known as:  Wendall Stan Your last dose was: Your next dose is: Take  by mouth three (3) times daily. 2 table spoons 3 times per day as needed Refills:  0  
     
   
   
   
  
 metoprolol tartrate 25 mg tablet Commonly known as:  LOPRESSOR Your last dose was: Your next dose is:    
   
   
 Dose:  25 mg Take 25 mg by mouth nightly. Refills:  0 Omeprazole delayed release 20 mg tablet Commonly known as:  PRILOSEC D/R Your last dose was: Your next dose is:    
   
   
 Dose:  20 mg Take 20 mg by mouth daily. Refills:  0 SLOW  mg (45 mg iron) ER tablet Generic drug:  ferrous sulfate Your last dose was: Your next dose is: Take  by mouth nightly. Refills:  0  
     
   
   
   
  
 ursodiol 300 mg capsule Commonly known as:  ACTIGALL Your last dose was: Your next dose is:    
   
   
 Dose:  300 mg Take 300 mg by mouth two (2) times a day. Refills:  0  
     
   
   
   
  
 XIFAXAN 550 mg tablet Generic drug:  rifAXIMin Your last dose was: Your next dose is:    
   
   
 Dose:  550 mg Take 550 mg by mouth three (3) times daily. Indications: HEPATIC ENCEPHALOPATHY Refills:  0 Where to Get Your Medications These medications were sent to [address], [address] [place] [id number] Phone:  [phone]  
  furosemide 40 mg tablet  
 simvastatin 10 mg tablet  
 warfarin 5 mg tablet Information on where to get these meds will be given to you by the nurse or doctor. ! Ask your nurse or doctor about these medications  
  oxyCODONE IR 5 mg immediate release tablet

## 2017-05-13 LAB
ANION GAP BLD CALC-SCNC: 9 MMOL/L (ref 5–15)
BUN SERPL-MCNC: 34 MG/DL (ref 6–20)
BUN/CREAT SERPL: 15 (ref 12–20)
CALCIUM SERPL-MCNC: 7.7 MG/DL (ref 8.5–10.1)
CHLORIDE SERPL-SCNC: 109 MMOL/L (ref 97–108)
CO2 SERPL-SCNC: 20 MMOL/L (ref 21–32)
CREAT SERPL-MCNC: 2.21 MG/DL (ref 0.7–1.3)
GLUCOSE BLD STRIP.AUTO-MCNC: 117 MG/DL (ref 65–100)
GLUCOSE BLD STRIP.AUTO-MCNC: 152 MG/DL (ref 65–100)
GLUCOSE BLD STRIP.AUTO-MCNC: 173 MG/DL (ref 65–100)
GLUCOSE BLD STRIP.AUTO-MCNC: 220 MG/DL (ref 65–100)
GLUCOSE SERPL-MCNC: 118 MG/DL (ref 65–100)
HGB BLD-MCNC: 7.9 G/DL (ref 12.1–17)
INR PPP: 1.4 (ref 0.9–1.1)
POTASSIUM SERPL-SCNC: 4.9 MMOL/L (ref 3.5–5.1)
PROTHROMBIN TIME: 14.8 SEC (ref 9–11.1)
SERVICE CMNT-IMP: ABNORMAL
SODIUM SERPL-SCNC: 138 MMOL/L (ref 136–145)

## 2017-05-13 PROCEDURE — 30233N0 TRANSFUSION OF AUTOLOGOUS RED BLOOD CELLS INTO PERIPHERAL VEIN, PERCUTANEOUS APPROACH: ICD-10-PCS | Performed by: ORTHOPAEDIC SURGERY

## 2017-05-13 PROCEDURE — 80048 BASIC METABOLIC PNL TOTAL CA: CPT | Performed by: ORTHOPAEDIC SURGERY

## 2017-05-13 PROCEDURE — 36430 TRANSFUSION BLD/BLD COMPNT: CPT

## 2017-05-13 PROCEDURE — 74011250637 HC RX REV CODE- 250/637: Performed by: PHYSICIAN ASSISTANT

## 2017-05-13 PROCEDURE — 74011250636 HC RX REV CODE- 250/636: Performed by: PHYSICIAN ASSISTANT

## 2017-05-13 PROCEDURE — 85018 HEMOGLOBIN: CPT | Performed by: ORTHOPAEDIC SURGERY

## 2017-05-13 PROCEDURE — G8978 MOBILITY CURRENT STATUS: HCPCS

## 2017-05-13 PROCEDURE — 74011000258 HC RX REV CODE- 258: Performed by: INTERNAL MEDICINE

## 2017-05-13 PROCEDURE — 74011250637 HC RX REV CODE- 250/637: Performed by: NURSE PRACTITIONER

## 2017-05-13 PROCEDURE — 97110 THERAPEUTIC EXERCISES: CPT

## 2017-05-13 PROCEDURE — G8979 MOBILITY GOAL STATUS: HCPCS

## 2017-05-13 PROCEDURE — P9016 RBC LEUKOCYTES REDUCED: HCPCS | Performed by: ORTHOPAEDIC SURGERY

## 2017-05-13 PROCEDURE — 65270000029 HC RM PRIVATE

## 2017-05-13 PROCEDURE — 74011250637 HC RX REV CODE- 250/637: Performed by: ORTHOPAEDIC SURGERY

## 2017-05-13 PROCEDURE — 36415 COLL VENOUS BLD VENIPUNCTURE: CPT | Performed by: ORTHOPAEDIC SURGERY

## 2017-05-13 PROCEDURE — 85610 PROTHROMBIN TIME: CPT | Performed by: ORTHOPAEDIC SURGERY

## 2017-05-13 PROCEDURE — 97161 PT EVAL LOW COMPLEX 20 MIN: CPT

## 2017-05-13 PROCEDURE — 74011000250 HC RX REV CODE- 250: Performed by: INTERNAL MEDICINE

## 2017-05-13 PROCEDURE — 74011250636 HC RX REV CODE- 250/636: Performed by: ORTHOPAEDIC SURGERY

## 2017-05-13 PROCEDURE — 82962 GLUCOSE BLOOD TEST: CPT

## 2017-05-13 RX ORDER — SODIUM CHLORIDE 9 MG/ML
250 INJECTION, SOLUTION INTRAVENOUS AS NEEDED
Status: DISCONTINUED | OUTPATIENT
Start: 2017-05-13 | End: 2017-05-22 | Stop reason: HOSPADM

## 2017-05-13 RX ORDER — WARFARIN SODIUM 5 MG/1
5 TABLET ORAL ONCE
Status: COMPLETED | OUTPATIENT
Start: 2017-05-13 | End: 2017-05-13

## 2017-05-13 RX ORDER — BUMETANIDE 0.25 MG/ML
2 INJECTION INTRAMUSCULAR; INTRAVENOUS ONCE
Status: DISPENSED | OUTPATIENT
Start: 2017-05-13 | End: 2017-05-13

## 2017-05-13 RX ORDER — SIMVASTATIN 10 MG/1
10 TABLET, FILM COATED ORAL
Status: DISCONTINUED | OUTPATIENT
Start: 2017-05-13 | End: 2017-05-22 | Stop reason: HOSPADM

## 2017-05-13 RX ADMIN — DOCUSATE SODIUM AND SENNOSIDES 1 TABLET: 8.6; 5 TABLET, FILM COATED ORAL at 10:46

## 2017-05-13 RX ADMIN — RIFAXIMIN 550 MG: 550 TABLET ORAL at 21:30

## 2017-05-13 RX ADMIN — ACETAMINOPHEN 650 MG: 325 TABLET, FILM COATED ORAL at 12:54

## 2017-05-13 RX ADMIN — DOCUSATE SODIUM AND SENNOSIDES 1 TABLET: 8.6; 5 TABLET, FILM COATED ORAL at 17:43

## 2017-05-13 RX ADMIN — RIFAXIMIN 550 MG: 550 TABLET ORAL at 10:54

## 2017-05-13 RX ADMIN — ALLOPURINOL 100 MG: 100 TABLET ORAL at 10:47

## 2017-05-13 RX ADMIN — SODIUM CHLORIDE 500 ML: 900 INJECTION, SOLUTION INTRAVENOUS at 02:50

## 2017-05-13 RX ADMIN — ACETAMINOPHEN 650 MG: 325 TABLET, FILM COATED ORAL at 06:43

## 2017-05-13 RX ADMIN — PANTOPRAZOLE SODIUM 40 MG: 40 TABLET, DELAYED RELEASE ORAL at 06:43

## 2017-05-13 RX ADMIN — Medication 10 ML: at 15:50

## 2017-05-13 RX ADMIN — URSODIOL 300 MG: 300 CAPSULE ORAL at 10:47

## 2017-05-13 RX ADMIN — SODIUM CHLORIDE: 450 INJECTION, SOLUTION INTRAVENOUS at 15:48

## 2017-05-13 RX ADMIN — WARFARIN SODIUM 5 MG: 5 TABLET ORAL at 12:54

## 2017-05-13 RX ADMIN — Medication 325 MG: at 21:30

## 2017-05-13 RX ADMIN — ACETAMINOPHEN 650 MG: 325 TABLET, FILM COATED ORAL at 17:43

## 2017-05-13 RX ADMIN — RIFAXIMIN 550 MG: 550 TABLET ORAL at 15:48

## 2017-05-13 RX ADMIN — POLYETHYLENE GLYCOL 3350 17 G: 17 POWDER, FOR SOLUTION ORAL at 10:48

## 2017-05-13 RX ADMIN — SODIUM CHLORIDE 125 ML/HR: 900 INJECTION, SOLUTION INTRAVENOUS at 04:56

## 2017-05-13 RX ADMIN — SIMVASTATIN 10 MG: 10 TABLET, FILM COATED ORAL at 22:24

## 2017-05-13 RX ADMIN — URSODIOL 300 MG: 300 CAPSULE ORAL at 17:43

## 2017-05-13 NOTE — PROGRESS NOTES
Problem: Mobility Impaired (Adult and Pediatric)  Goal: *Acute Goals and Plan of Care (Insert Text)  Physical Therapy Goals  Initiated 5/13/2017    1. Patient will move from supine to sit and sit to supine and scoot up and down in bed with modified independence within 4 days. 2. Patient will perform sit to stand with minimal assistance/contact guard assist within 4 days. 3. Patient will ambulate with modified independence for 300 feet with the least restrictive device within 4 days. 4. Patient will ascend/descend 5 stairs with 1 handrail(s) with modified independence within 4 days. 5. Patient will perform home exercise program per protocol with independence within 4 days. 6. Patient will demonstrate AROM 0-90 degrees in operative joint within 4 days. PHYSICAL THERAPY KNEE EVALUATION  Patient: Eduard Giron III (20 y.o. male)  Date: 5/13/2017  Primary Diagnosis: STATUS POST RIGHT TOTAL KNEE REPLACEMENT  Failed total right knee replacement, sequela  Procedure(s) (LRB):  REMOVAL OF PROSTALAC SPACER, RIGHT TOTAL KNEE REVISION (Right) 1 Day Post-Op   Precautions: kidney disease, Fall, WBAT      ASSESSMENT :  Based on the objective data described below, the patient presents with right knee immobilized, global LQ edema s/p failed right knee TKA s/p revision. He presented in supine head of bed elevated in no apparent distress and agreeable to participation in physical therapy. The patient's BP measures were low, and therapist cued the patient to complete ankle pumps and breathing. In general transfers wer SBG-Min A with cuing for safety. Patient transferred to sitting and orthostatics revealed persisting low BP measures, as such mobility was deferred correlating with slow speech and patient reporting \"I don't feel great, a little woozy and off\". WB status discussed with patient.  Therapist instructed patient in therapeutic exercise; quad sets, ankle pumps, glute sets and encouraged patient to complete regularly while in bed or chair. Patient transferred to supine HOB elevated, and left in no apparent distress, call button within reach. For transfers with nursing: Min-Mod A, mobility assessment forthcoming with stable vital signs     Patient will benefit from skilled intervention to address the above impairments. Patients rehabilitation potential is considered to be Good  Factors which may influence rehabilitation potential include:   [ ]         None noted  [ ]         Mental ability/status  [X]         Medical condition  [ ]         Home/family situation and support systems  [X]         Safety awareness  [ ]         Pain tolerance/management  [ ]         Other:        PLAN :  Recommendations and Planned Interventions:  [X]           Bed Mobility Training             [X]    Neuromuscular Re-Education  [X]           Transfer Training                   [ ]    Orthotic/Prosthetic Training  [X]           Gait Training                         [ ]    Modalities  [X]           Therapeutic Exercises           [X]    Edema Management/Control  [X]           Therapeutic Activities            [X]    Patient and Family Training/Education  [ ]           Other (comment):     Frequency/Duration: Patient will be followed by physical therapy twice daily to address goals. Discharge Recommendations: Rehab vs  with 24 hour assist for all transfers and mobility  Further Equipment Recommendations for Discharge: patient reports having RW at home       SUBJECTIVE:   Patient stated I feel a little off.       OBJECTIVE DATA SUMMARY:   HISTORY:    Past Medical History:   Diagnosis Date    Acute kidney failure with tubular necrosis ([facility] 2017     DR Hemalatha Sultana    Adverse effect of anesthesia       violent with anesthesia in 1969    Anesthesia complication 2031     violent after anesthesia     Arrhythmia      Arthritis      Atrial tachycardia (HCC)       DR L[facility]    Cataract       LEFT    Chronic cough 2014-present    Chronic kidney disease       elevated creatine level    Chronic pain      Cirrhosis of liver (HCC)      CKD (chronic kidney disease) stage 3, GFR 30-59 ml/min        [id number]    Coagulation defects       COUMADIN    Diabetes ([address]      Esophageal tear 2011    Gastric varices 2016     Dr. Judith Talavera    GERD (gastroesophageal reflux disease) 2007     TORE ESOPHAGUS    Gouty arthropathy      Iron deficiency anemia       RECEIVED IRON INFUSIONS     Lymphedema       RLL    CAMPOVERDE (nonalcoholic steatohepatitis) 2016     Dr. Lexy Cuevas Spider angioma 2016     upper torso- Dr. Marcelino Galvez [facility]) 1980, 1995     RLE, LLE    Umbilical hernia 9271     Past Surgical History:   Procedure Laterality Date    HX GI   2007     EGD X3 FOR ESOPHAGUS REPAIR    HX HERNIA REPAIR Right 2015    HX KNEE REPLACEMENT Right 2011    HX KNEE REPLACEMENT Right 10/31/2016     REVISION WITH SPACER    HX ORTHOPAEDIC   2010     LEFT KNEE ARTHROSCOPY    HX ORTHOPAEDIC   2011     RT WRIST, ELBOW CARTILIDGE REPAIR    HX ORTHOPAEDIC         RIGHT KNEE SURGERY  X4    HX OTHER SURGICAL Bilateral       TEAR DUCTS    HX OTHER SURGICAL Right 1978     fatty tumor arm    HX TONSILLECTOMY         Prior Level of Function/Home Situation: Independent, home with spouse  Personal factors and/or comorbidities impacting plan of care:      Home Situation  Home Environment: Private residence  # Steps to Enter: 4  Rails to Enter: Yes  Hand Rails : Right  One/Two Story Residence: Split level  # of Interior Steps: 5  Interior Rails: Right  Lift Chair Available: No  Living Alone: No  Support Systems: Spouse/Significant Other/Partner  Patient Expects to be Discharged to[de-identified] Private residence  Current DME Used/Available at Home: Walker, rolling     EXAMINATION/PRESENTATION/DECISION MAKING:   Critical Behavior:  Neurologic State: Alert, Appropriate for age           Hearing:   Auditory  Auditory Impairment: Hard of hearing, bilateral  Hearing Aids/Status: Bilateral     Range Of Motion:                          Strength: Tone & Sensation:                                  Coordination:     Vision:      Functional Mobility:  Bed Mobility:  Rolling: Minimum assistance;Contact guard assistance; Additional time  Supine to Sit: Contact guard assistance;Minimum assistance;Assist x1  Sit to Supine: Contact guard assistance;Minimum assistance;Assist x1  Scooting: Contact guard assistance  Transfers:                             Balance:   Sitting: Impaired; With support  Ambulation/Gait Training:        Therapeutic Exercises:   Quad sets x 10  Glute sets x 10  Ankle pumps x 20     Functional Measure:  Tinetti test:      Sitting Balance: 0  Arises: 0  Attempts to Rise: 0  Immediate Standing Balance: 0  Standing Balance: 1  Nudged: 0  Eyes Closed: 0  Turn 360 Degrees - Continuous/Discontinuous: 0  Turn 360 Degrees - Steady/Unsteady: 0  Sitting Down: 1  Balance Score: 2  Indication of Gait: 0  R Step Length/Height: 0  L Step Length/Height: 0  R Foot Clearance: 1  L Foot Clearance: 1  Step Symmetry: 0  Step Continuity: 0  Path: 1  Trunk: 0  Walking Time: 0  Gait Score: 3  Total Score: 5         Tinetti Test and G-code impairment scale:  Percentage of Impairment CH     0%    CI     1-19% CJ     20-39% CK     40-59% CL     60-79% CM     80-99% CN      100%   Tinetti  Score 0-28 28 23-27 17-22 12-16 6-11 1-5 0          Tinetti Tool Score Risk of Falls  <19 = High Fall Risk  19-24 = Moderate Fall Risk  25-28 = Low Fall Risk  Tinetti ME. Performance-Oriented Assessment of Mobility Problems in Elderly Patients. [facility]; [id number].  (Scoring Description: PT Bulletin Feb. 10, 1993)     Older adults: Aj Urias et al, 2009; n = [address] [email or web address] elderly evaluated with ABC, ALANNA, ADL, and IADL)  · Mean ALANNA score for males aged 69-68 years = 26.21(3.40)  · Mean ALANNA score for females age 69-68 years = 25.16(4.30)  · Mean ALANNA score for males over 80 years = 23.29(6.02)  · Mean ALANNA score for females over 80 years = 17.20(8.32)         G codes: In compliance with CMSs Claims Based Outcome Reporting, the following G-code set was chosen for this patient based on their primary functional limitation being treated: The outcome measure chosen to determine the severity of the functional limitation was the Tinetti Test with a score of 5/28\ which was correlated with the impairment scale. · Mobility - Walking and Moving Around:               - CURRENT STATUS:   CM:- 80-99% impaired, limited or restricted               - GOAL STATUS:          CL:- 60-79% impaired, limited or restricted               - D/C STATUS:                       ---------------To be determined---------------         Physical Therapy Evaluation Charge Determination   History Examination Presentation Decision-Making   HIGH Complexity :3+ comorbidities / personal factors will impact the outcome/ POC  MEDIUM Complexity : 3 Standardized tests and measures addressing body structure, function, activity limitation and / or participation in recreation  LOW Complexity : Stable, uncomplicated  LOW Complexity : FOTO score of       Based on the above components, the patient evaluation is determined to be of the following complexity level: LOW      Pain:                    Activity Tolerance:   Fair; hypotensive             Please refer to the flowsheet for vital signs taken during this treatment. After treatment:   [ ]         Patient left in no apparent distress sitting up in chair  [X]         Patient left in no apparent distress in bed  [X]         Call bell left within reach  [X]         Nursing notified  [ ]         Caregiver present  [ ]         Bed alarm activated      COMMUNICATION/EDUCATION:   The patients plan of care was discussed with: Registered Nurse.  [X]         Fall prevention education was provided and the patient/caregiver indicated understanding.   [X] Patient/family have participated as able in goal setting and plan of care. [X]         Patient/family agree to work toward stated goals and plan of care. [ ]         Patient understands intent and goals of therapy, but is neutral about his/her participation. [ ]         Patient is unable to participate in goal setting and plan of care.      Thank you for this referral.  Lucrecia Lin, PT   Time Calculation: 22 mins

## 2017-05-13 NOTE — CONSULTS
[facility] - Consult    Subjective:     HPI:   61 yo white male with Staph infection of right TKR, S/P removal 5/12, now with HERBIE. He has CKD with baseline creat~1.8. Renal insults include Toradol and relative hypotension.     Patient Active Problem List    Diagnosis Date Noted    Failed total right knee replacement ([address] 05/11/2017    Infected prosthetic knee joint ([address] 10/31/2016    Infection of total knee replacement ([address] 10/30/2016    Thromboembolus ([address]     Rosacea     Lymphedema     Iron deficiency anemia     Diabetes ([address]     Cirrhosis of liver (HCC)     Chronic pain     Chronic cough     Atrial tachycardia ([address]     Arthritis     Arrhythmia     Spider angioma 01/01/2016    CAMPOVERDE (nonalcoholic steatohepatitis) 01/01/2016    Gastric varices 01/01/2016    Osteoarthritis of knee 04/10/2012    Esophageal tear 01/01/2011    GERD (gastroesophageal reflux disease) 01/01/2007    Anesthesia complication 88/14/8736     Past Medical History:   Diagnosis Date    Acute kidney failure with tubular necrosis ([address] 2017    DR Marti Howard    Adverse effect of anesthesia     violent with anesthesia in 1969    Anesthesia complication 2360    violent after anesthesia     Arrhythmia     Arthritis     Atrial tachycardia ([address]     DR Batres Dapper    Cataract     LEFT    Chronic cough 2014-present    Chronic kidney disease     elevated creatine level    Chronic pain     Cirrhosis of liver (HCC)     CKD (chronic kidney disease) stage 3, GFR 30-59 ml/min      [id number]    Coagulation defects     COUMADIN    Diabetes ([address]     Esophageal tear 2011    Gastric varices 2016    Dr. Morgan Yanez    GERD (gastroesophageal reflux disease) 2007    TORE ESOPHAGUS    Gouty arthropathy     Iron deficiency anemia     RECEIVED IRON INFUSIONS     Lymphedema     RLL    CAMPOVERDE (nonalcoholic steatohepatitis) 2016    Dr. Morgan Yanez [facility]    Rosacea     Spider angioma 2016    upper torso-  [facility]    Thromboembolus ([facility] [address] 1980, 1995    RLE, LLE    Umbilical hernia 4166      Past Surgical History:   Procedure Laterality Date    HX GI  2007    EGD X3 FOR ESOPHAGUS REPAIR    HX HERNIA REPAIR Right 2015    HX KNEE REPLACEMENT Right 2011    HX KNEE REPLACEMENT Right 10/31/2016    REVISION WITH SPACER    HX ORTHOPAEDIC  2010    LEFT KNEE ARTHROSCOPY    HX ORTHOPAEDIC  2011    RT WRIST, ELBOW CARTILIDGE REPAIR    HX ORTHOPAEDIC      RIGHT KNEE SURGERY  X4    HX OTHER SURGICAL Bilateral     TEAR DUCTS    HX OTHER SURGICAL Right 1978    fatty tumor arm    HX TONSILLECTOMY       Family History   Problem Relation Age of Onset    Stroke Mother     Diabetes Mother     Cancer Mother      UNKNOWN    Arthritis-osteo Father     Bleeding Prob Father      DVT    Diabetes Father     Heart Attack Father     Thyroid Disease Sister     No Known Problems Brother     Obesity Son     Bleeding Prob Son 29     DVT     Other Son      SYNCOPE D/T UNKNOWN NERVE DISORDER    Anesth Problems Neg Hx       Social History   Substance Use Topics    Smoking status: Never Smoker    Smokeless tobacco: Former User     Quit date: 1/15/2007    Alcohol use No      Comment: NO ETOH IN LAST YEAR, 1 DAILY PRIOR      Allergies   Allergen Reactions    Pcn [Penicillins] Anaphylaxis and Shortness of Breath    Atropine Other (comments)     UNSURE OF REACTION, 50 YRS AGO      Prior to Admission medications    Medication Sig Start Date End Date Taking? Authorizing Provider   oxyCODONE IR (ROXICODONE) 5 mg immediate release tablet Take 1-2 Tabs by mouth every four (4) hours as needed for Pain. Max Daily Amount: 60 mg. 5/12/17  Yes Liz Kincaid MD   dilTIAZem ER (CARDIZEM LA) 120 mg tablet Take 120 mg by mouth every morning. Yes Historical Provider   metoprolol tartrate (LOPRESSOR) 25 mg tablet Take 25 mg by mouth nightly. Yes Historical Provider   furosemide (LASIX) 40 mg tablet Take 40 mg by mouth every other day. Yes Historical Provider   ferrous sulfate (SLOW FE) 142 mg (45 mg iron) ER tablet Take  by mouth nightly. Yes Historical Provider   warfarin (COUMADIN) 5 mg tablet Take 5 mg by mouth daily. Yes Historical Provider   ursodiol (ACTIGALL) 300 mg capsule Take 300 mg by mouth two (2) times a day. Yes Historical Provider   lactulose (CHRONULAC) 10 gram/15 mL solution Take  by mouth three (3) times daily. 2 table spoons 3 times per day as needed   Yes Historical Provider   rifAXIMin (XIFAXAN) 550 mg tablet Take 550 mg by mouth three (3) times daily. Indications: HEPATIC ENCEPHALOPATHY   Yes Historical Provider   allopurinol (ZYLOPRIM) 100 mg tablet Take 100 mg by mouth daily. Yes Historical Provider   simvastatin (ZOCOR) 40 mg tablet Take 40 mg by mouth nightly. Yes Historical Provider   Omeprazole delayed release (PRILOSEC D/R) 20 mg tablet Take 20 mg by mouth daily.      Yes Historical Provider     Current Facility-Administered Medications   Medication Dose Route Frequency    0.9% sodium chloride infusion 250 mL  250 mL IntraVENous PRN    sodium bicarbonate (8.4%) 75 mEq in 0.45% sodium chloride 1,000 mL infusion   IntraVENous CONTINUOUS    warfarin (COUMADIN) tablet 5 mg  5 mg Oral ONCE    bumetanide (BUMEX) injection 2 mg  2 mg IntraVENous ONCE    sodium chloride (NS) flush 5-10 mL  5-10 mL IntraVENous Q8H    sodium chloride (NS) flush 5-10 mL  5-10 mL IntraVENous PRN    acetaminophen (TYLENOL) tablet 650 mg  650 mg Oral Q6H    acetaminophen (TYLENOL) tablet 650 mg  650 mg Oral Q6H PRN    oxyCODONE IR (ROXICODONE) tablet 5 mg  5 mg Oral Q3H PRN    oxyCODONE IR (ROXICODONE) tablet 10 mg  10 mg Oral Q3H PRN    HYDROmorphone (PF) (DILAUDID) injection 0.5 mg  0.5 mg IntraVENous Q4H PRN    naloxone (NARCAN) injection 0.4 mg  0.4 mg IntraVENous PRN    dexamethasone (PF) (DECADRON) injection 10 mg  10 mg IntraVENous ONCE    ondansetron (ZOFRAN) injection 4 mg  4 mg IntraVENous Q4H PRN    hydrOXYzine HCl (ATARAX) tablet 10 mg  10 mg Oral Q8H PRN    senna-docusate (PERICOLACE) 8.6-50 mg per tablet 1 Tab  1 Tab Oral BID    polyethylene glycol (MIRALAX) packet 17 g  17 g Oral DAILY    [START ON 2017] bisacodyl (DULCOLAX) suppository 10 mg  10 mg Rectal DAILY PRN    simvastatin (ZOCOR) tablet 40 mg  40 mg Oral QHS    metoprolol tartrate (LOPRESSOR) tablet 25 mg  25 mg Oral QHS    ursodiol (ACTIGALL) capsule 300 mg  300 mg Oral BID    dilTIAZem CD (CARDIZEM CD) capsule 120 mg  120 mg Oral DAILY    ferrous sulfate tablet 325 mg  325 mg Oral QHS    lactulose (CHRONULAC) solution 10 g  10 g Oral TID    rifAXIMin (XIFAXAN) tablet 550 mg  550 mg Oral TID    allopurinol (ZYLOPRIM) tablet 100 mg  100 mg Oral DAILY    pantoprazole (PROTONIX) tablet 40 mg  40 mg Oral ACB    Warfarin- pharmacy to dose   Other Rx Dosing/Monitoring       Review of Systems:  A comprehensive review of systems was negative except for that written in the HPI. Objective:     Patient Vitals for the past 8 hrs:   BP Temp Pulse Resp SpO2   17 0313 91/51 97.4 °F (36.3 °C) (!) 52 16 95 %      Temp (24hrs), Av.6 °F (36.4 °C), Min:97.4 °F (36.3 °C), Max:98 °F (36.7 °C)    Intake and Output:   1901 -  0700  In: 9172 [P.O.:240; I.V.:2000]  Out: 1720 [Urine:300; Drains:1070]       Physical Exam:  Visit Vitals    BP 91/51    Pulse (!) 52    Temp 97.4 °F (36.3 °C)    Resp 16    Ht 6' 1\" (1.854 m)    Wt 127 kg (280 lb)    SpO2 95%    BMI 36.94 kg/m2     General:  Alert, cooperative, no distress, appears stated age. Head:  Normocephalic, without obvious abnormality, atraumatic. Throat: Moist mucosa   Neck: Supple, symmetrical, trachea midline, no adenopathy, thyroid: no enlargement/tenderness/nodules, some JVD. Back:   Symmetric, no curvature. ROM normal. No CVA tenderness. Lungs:   Clear to auscultation bilaterally. Chest wall:  No tenderness or deformity.    Heart:  Regular rate and rhythm, S1, S2 normal, no murmur, click, rub or gallop. Abdomen:   Soft, non-tender. Bowel sounds normal. No masses,  No organomegaly. Extremities: Right leg bandaged; 2+ edema LLE               Neurologic:  non focal       Reviewed: labs    Assessment:       Principal Problem:    Failed total right knee replacement ([address] (5/11/2017)      Overview: REMOVAL OF HARDWARE & REVISION RIGHT TOTAL KNEE REPLACEMENT 5/12/17    Cirrhosis, CAMPOVERDE  DM  HTN  Gout  Staph infection of right TKR, S/P removal 5/12/17  CKD~1.8    HERBIE - Toradol and relative hypotension    Plan:     Stop Toradol and all NSAIDs  Stop diuretic for now except after transfusion  Agree with holding BP meds for relative hypotension  Serial albs      Thank you for allowing me to participate in the care of your patient. Will follow closely.     Signed By: Lisette Maier MD                       May 13, 2017

## 2017-05-13 NOTE — ANESTHESIA POSTPROCEDURE EVALUATION
Post-Anesthesia Evaluation and Assessment    Patient: Boyd Logan MRN: [id number]  SSN: [id number]    YOB: 1955  Age: 60 y.o. Sex: male       Cardiovascular Function/Vital Signs  Visit Vitals    BP (!) 89/39 (BP 1 Location: Left arm, BP Patient Position: At rest)    Pulse 63    Temp 36.3 °C (97.4 °F)    Resp 16    Ht 6' 1\" (1.854 m)    Wt 127 kg (280 lb)    SpO2 97%    BMI 36.94 kg/m2       Patient is status post general anesthesia for Procedure(s):  REMOVAL OF PROSTALAC SPACER, RIGHT TOTAL KNEE REVISION. Nausea/Vomiting: None    Postoperative hydration reviewed and adequate. Pain:  Pain Scale 1: Numeric (0 - 10) (05/12/17 2124)  Pain Intensity 1: 0 (05/12/17 2124)   Managed    Neurological Status:   Neuro (WDL): Exceptions to WDL (05/12/17 1515)  Neuro  Neurologic State: Alert; Appropriate for age (05/13/17 1039)  LLE Motor Response: Purposeful (05/13/17 1039)  RLE Motor Response: Purposeful (05/13/17 1039)   At baseline    Mental Status and Level of Consciousness: Arousable    Pulmonary Status:   O2 Device: Room air (05/13/17 1039)   Adequate oxygenation and airway patent    Complications related to anesthesia: None    Post-anesthesia assessment completed.  No concerns    Signed By: Cedric Green MD     May 13, 2017

## 2017-05-13 NOTE — PROGRESS NOTES
Bedside shift change report given to Anaid KumarRN (oncoming nurse) by Edmund Hemphill RN(offgoing nurse). Report given with SBAR.

## 2017-05-13 NOTE — PROGRESS NOTES
Physical Therapy  Attempted to see for BID session. Pt receiving blood transfusion in right wrist. Will follow up tomorrow for PT, recommend OOB to chair with nursing assist tonight and in AM as tolerated. Thanks.    Hardik Stafford, PT, DPT

## 2017-05-13 NOTE — PROGRESS NOTES
Pharmacist Note  Warfarin Dosing  Consult provided for this 62 y.o.male to manage warfarin for Afib, s/p orthopedic surgery     INR Goal: 2 - 3    Home regimen/ tablet size: 5 mg PO daily    Drugs that may increase INR: None  Drugs that may decrease INR: None  Other current anticoagulants/ drugs that may increase bleeding risk: NSAID  Risk factors: None  Daily INR ordered: YES    Recent Labs      05/13/17   0511  05/13/17   0103  05/12/17   0757  05/12/17   0751   HGB   --   7.9*   --   9.8*   INR  1.4*   --   1.3*   --      Date               INR                  Dose  5/12                1.3                   5 mg  5/13                1.4                   5 mg                                                                               Assessment/ Plan: Will order warfarin 5 mg PO x 1 dose. Pharmacy will continue to monitor daily and adjust therapy as indicated. Please contact the pharmacist at  or  for outpatient recommendations if needed. Jaswant Eastman, Pharm. D., BCPS

## 2017-05-13 NOTE — PROGRESS NOTES
Bedside and Verbal shift change report given to Eber Wong (oncoming nurse) by Katie Gr (offgoing nurse). Report included the following information SBAR, Kardex, Intake/Output, MAR and Recent Results.

## 2017-05-13 NOTE — ROUTINE PROCESS
Bedside and Verbal shift change report given to Abbi Mendieta (oncoming nurse) by Annetta Radford RN (offgoing nurse). Report included the following information SBAR, Kardex, Intake/Output, MAR and Recent Results.

## 2017-05-13 NOTE — PROGRESS NOTES
S: pain controlled, high drain output, pending 1 unit transfusion. Elevated creatnine overnight. Hospitalist consulted    O:  Patient Vitals for the past 12 hrs:   Temp Pulse Resp BP SpO2   05/13/17 1258 97.4 °F (36.3 °C) 63 16 (!) 89/39 97 %   05/13/17 1235 97.4 °F (36.3 °C) 63 16 103/60 97 %   05/13/17 1153 97.6 °F (36.4 °C) 62 16 (!) 88/55 97 %   05/13/17 1140 97.5 °F (36.4 °C) (!) 59 16 (!) 87/46 99 %   05/13/17 1121 97.5 °F (36.4 °C) 60 16 94/56 96 %   05/13/17 1018 - 63 - 91/53 -   05/13/17 0313 97.4 °F (36.3 °C) (!) 52 16 91/51 95 %     Recent Results (from the past 12 hour(s))   PROTHROMBIN TIME + INR    Collection Time: 05/13/17  5:11 AM   Result Value Ref Range    INR 1.4 (H) 0.9 - 1.1      Prothrombin time 14.8 (H) 9.0 - 11.1 sec   GLUCOSE, POC    Collection Time: 05/13/17  6:23 AM   Result Value Ref Range    Glucose (POC) 117 (H) 65 - 100 mg/dL    Performed by [address] [place] [address], POC    Collection Time: 05/13/17 11:42 AM   Result Value Ref Range    Glucose (POC) 152 (H) 65 - 100 mg/dL    Performed by [facility] [address];  Knee immobilzer intact. Moving toes    A: pod 1 revision right total knee  PT/OT  Keep knee in extension at all times  DVT pprx  Pain control  Hospitalist recs for medical management.   Monitor hemodynamic status

## 2017-05-13 NOTE — CONSULTS
Consult noted  Pt known to me and will be seen by Dr Damaris Cordova later today   Transfuse one unit of blood and start Bicarb gtt x 1L     Isa Hussein MD, [facility]phrology [facility].   Office :[phone]  Fax: [phone]

## 2017-05-13 NOTE — ROUTINE PROCESS
0217: Paged on call ortho. 0235: Dr. William Rojas notified of pt urine output 50 cc since 2130, Creatinine 2.21 & HGB 7.9. Verbal order for 500 cc bolus and hospitalist consult. 0310: Hospitalist paged. 0330: 2nd page to hospitalist.     3993: Paged hospitalist.     1403: Patrice Prado NP notified and pt stated he his kidney doctor is DR Strauss Lighter: Paged Dr. Vero Harrison. 0700: Dr. Vero Harrison notified. Verbal order given to transfuse 1 unit of blood and then start 1/2 normal saline sodium bicarb 75 meq (1 liter). Stated one of his partners will see pt this morning.

## 2017-05-13 NOTE — PROGRESS NOTES
Spoke to Dr. Stan Iraheta because patient's hemovac put out 310 ml at 9:24 PM.  Unable to determine if hemovac was charged or not. During recovery patient has recorded 250 ml output at 2 PM and 400 ml at 4 PM.  Patient's current BP is 110/55 and HR 69. Dr. Stan Iraheta said to continue to monitor and draw labs in the morning. If still lots of output may order Hgb early.

## 2017-05-14 LAB
ANION GAP BLD CALC-SCNC: 9 MMOL/L (ref 5–15)
BASOPHILS # BLD AUTO: 0 K/UL (ref 0–0.1)
BASOPHILS # BLD: 1 % (ref 0–1)
BUN SERPL-MCNC: 42 MG/DL (ref 6–20)
BUN/CREAT SERPL: 12 (ref 12–20)
CALCIUM SERPL-MCNC: 7.8 MG/DL (ref 8.5–10.1)
CHLORIDE SERPL-SCNC: 107 MMOL/L (ref 97–108)
CO2 SERPL-SCNC: 19 MMOL/L (ref 21–32)
CREAT SERPL-MCNC: 3.46 MG/DL (ref 0.7–1.3)
DIFFERENTIAL METHOD BLD: ABNORMAL
EOSINOPHIL # BLD: 0.1 K/UL (ref 0–0.4)
EOSINOPHIL NFR BLD: 4 % (ref 0–7)
ERYTHROCYTE [DISTWIDTH] IN BLOOD BY AUTOMATED COUNT: 18.4 % (ref 11.5–14.5)
EST. AVERAGE GLUCOSE BLD GHB EST-MCNC: 117 MG/DL
GLUCOSE BLD STRIP.AUTO-MCNC: 117 MG/DL (ref 65–100)
GLUCOSE BLD STRIP.AUTO-MCNC: 147 MG/DL (ref 65–100)
GLUCOSE BLD STRIP.AUTO-MCNC: 166 MG/DL (ref 65–100)
GLUCOSE BLD STRIP.AUTO-MCNC: 194 MG/DL (ref 65–100)
GLUCOSE SERPL-MCNC: 142 MG/DL (ref 65–100)
HBA1C MFR BLD: 5.7 % (ref 4.2–6.3)
HCT VFR BLD AUTO: 23.1 % (ref 36.6–50.3)
HGB BLD-MCNC: 7.5 G/DL (ref 12.1–17)
INR PPP: 1.7 (ref 0.9–1.1)
LYMPHOCYTES # BLD AUTO: 14 % (ref 12–49)
LYMPHOCYTES # BLD: 0.5 K/UL (ref 0.8–3.5)
MAGNESIUM SERPL-MCNC: 1.6 MG/DL (ref 1.6–2.4)
MCH RBC QN AUTO: 27.2 PG (ref 26–34)
MCHC RBC AUTO-ENTMCNC: 32.5 G/DL (ref 30–36.5)
MCV RBC AUTO: 83.7 FL (ref 80–99)
MONOCYTES # BLD: 0.4 K/UL (ref 0–1)
MONOCYTES NFR BLD AUTO: 13 % (ref 5–13)
NEUTS SEG # BLD: 2.4 K/UL (ref 1.8–8)
NEUTS SEG NFR BLD AUTO: 68 % (ref 32–75)
PLATELET # BLD AUTO: 66 K/UL (ref 150–400)
POTASSIUM SERPL-SCNC: 4 MMOL/L (ref 3.5–5.1)
PROTHROMBIN TIME: 17.4 SEC (ref 9–11.1)
RBC # BLD AUTO: 2.76 M/UL (ref 4.1–5.7)
RBC MORPH BLD: ABNORMAL
SERVICE CMNT-IMP: ABNORMAL
SODIUM SERPL-SCNC: 135 MMOL/L (ref 136–145)
WBC # BLD AUTO: 3.4 K/UL (ref 4.1–11.1)

## 2017-05-14 PROCEDURE — 74011250637 HC RX REV CODE- 250/637: Performed by: PHYSICIAN ASSISTANT

## 2017-05-14 PROCEDURE — 74011000250 HC RX REV CODE- 250: Performed by: INTERNAL MEDICINE

## 2017-05-14 PROCEDURE — 74011000258 HC RX REV CODE- 258: Performed by: INTERNAL MEDICINE

## 2017-05-14 PROCEDURE — 82962 GLUCOSE BLOOD TEST: CPT

## 2017-05-14 PROCEDURE — 83735 ASSAY OF MAGNESIUM: CPT | Performed by: INTERNAL MEDICINE

## 2017-05-14 PROCEDURE — 97530 THERAPEUTIC ACTIVITIES: CPT

## 2017-05-14 PROCEDURE — 65270000029 HC RM PRIVATE

## 2017-05-14 PROCEDURE — 85025 COMPLETE CBC W/AUTO DIFF WBC: CPT | Performed by: INTERNAL MEDICINE

## 2017-05-14 PROCEDURE — 85610 PROTHROMBIN TIME: CPT | Performed by: INTERNAL MEDICINE

## 2017-05-14 PROCEDURE — 74011250637 HC RX REV CODE- 250/637: Performed by: NURSE PRACTITIONER

## 2017-05-14 PROCEDURE — 74011636637 HC RX REV CODE- 636/637: Performed by: HOSPITALIST

## 2017-05-14 PROCEDURE — 83036 HEMOGLOBIN GLYCOSYLATED A1C: CPT | Performed by: HOSPITALIST

## 2017-05-14 PROCEDURE — 80048 BASIC METABOLIC PNL TOTAL CA: CPT | Performed by: INTERNAL MEDICINE

## 2017-05-14 PROCEDURE — 36415 COLL VENOUS BLD VENIPUNCTURE: CPT | Performed by: INTERNAL MEDICINE

## 2017-05-14 PROCEDURE — 97116 GAIT TRAINING THERAPY: CPT

## 2017-05-14 RX ORDER — MAGNESIUM SULFATE 100 %
4 CRYSTALS MISCELLANEOUS AS NEEDED
Status: DISCONTINUED | OUTPATIENT
Start: 2017-05-14 | End: 2017-05-22 | Stop reason: HOSPADM

## 2017-05-14 RX ORDER — WARFARIN SODIUM 5 MG/1
5 TABLET ORAL ONCE
Status: DISPENSED | OUTPATIENT
Start: 2017-05-14 | End: 2017-05-14

## 2017-05-14 RX ORDER — INSULIN LISPRO 100 [IU]/ML
INJECTION, SOLUTION INTRAVENOUS; SUBCUTANEOUS
Status: DISCONTINUED | OUTPATIENT
Start: 2017-05-14 | End: 2017-05-22 | Stop reason: HOSPADM

## 2017-05-14 RX ORDER — DEXTROSE 50 % IN WATER (D50W) INTRAVENOUS SYRINGE
12.5-25 AS NEEDED
Status: DISCONTINUED | OUTPATIENT
Start: 2017-05-14 | End: 2017-05-16 | Stop reason: ALTCHOICE

## 2017-05-14 RX ADMIN — RIFAXIMIN 550 MG: 550 TABLET ORAL at 16:51

## 2017-05-14 RX ADMIN — ALLOPURINOL 100 MG: 100 TABLET ORAL at 08:59

## 2017-05-14 RX ADMIN — SIMVASTATIN 10 MG: 10 TABLET, FILM COATED ORAL at 22:23

## 2017-05-14 RX ADMIN — RIFAXIMIN 550 MG: 550 TABLET ORAL at 22:22

## 2017-05-14 RX ADMIN — SODIUM CHLORIDE: 450 INJECTION, SOLUTION INTRAVENOUS at 02:09

## 2017-05-14 RX ADMIN — ACETAMINOPHEN 650 MG: 325 TABLET, FILM COATED ORAL at 07:06

## 2017-05-14 RX ADMIN — URSODIOL 300 MG: 300 CAPSULE ORAL at 18:00

## 2017-05-14 RX ADMIN — URSODIOL 300 MG: 300 CAPSULE ORAL at 09:00

## 2017-05-14 RX ADMIN — ACETAMINOPHEN 650 MG: 325 TABLET, FILM COATED ORAL at 23:59

## 2017-05-14 RX ADMIN — INSULIN LISPRO 2 UNITS: 100 INJECTION, SOLUTION INTRAVENOUS; SUBCUTANEOUS at 16:52

## 2017-05-14 RX ADMIN — INSULIN LISPRO 2 UNITS: 100 INJECTION, SOLUTION INTRAVENOUS; SUBCUTANEOUS at 13:46

## 2017-05-14 RX ADMIN — RIFAXIMIN 550 MG: 550 TABLET ORAL at 08:58

## 2017-05-14 RX ADMIN — PANTOPRAZOLE SODIUM 40 MG: 40 TABLET, DELAYED RELEASE ORAL at 07:06

## 2017-05-14 RX ADMIN — ACETAMINOPHEN 650 MG: 325 TABLET, FILM COATED ORAL at 17:48

## 2017-05-14 RX ADMIN — Medication 10 ML: at 22:25

## 2017-05-14 RX ADMIN — ACETAMINOPHEN 650 MG: 325 TABLET, FILM COATED ORAL at 13:46

## 2017-05-14 RX ADMIN — SODIUM CHLORIDE: 450 INJECTION, SOLUTION INTRAVENOUS at 19:03

## 2017-05-14 RX ADMIN — Medication 325 MG: at 21:22

## 2017-05-14 RX ADMIN — METOPROLOL TARTRATE 25 MG: 25 TABLET ORAL at 21:22

## 2017-05-14 RX ADMIN — Medication 10 ML: at 13:51

## 2017-05-14 RX ADMIN — SODIUM CHLORIDE: 450 INJECTION, SOLUTION INTRAVENOUS at 08:56

## 2017-05-14 NOTE — ROUTINE PROCESS
Bedside shift change report given to Kim Cadena (oncoming nurse) by Urvashi Alejo RN(offgoing nurse). Report given with SBAR.

## 2017-05-14 NOTE — PROGRESS NOTES
I called and talked with Dr. Enrike Landeros with concern for the patient's HGB of 7-5 today and giving the patient 5 mg of Coumadin. Per Dr. Nathan Andre hold the Coumadin and reevaluate tomorrow.

## 2017-05-14 NOTE — PROGRESS NOTES
Bedside and Verbal shift change report given to [facility] (oncoming nurse) by Milagros Crawford (offgoing nurse). Report included the following information SBAR.

## 2017-05-14 NOTE — PROGRESS NOTES
[facility] - Progress Note    Subjective:   Daily Progress Note    Admission Date: 5/12/2017     Complaint: A+O, not SOB    Current Facility-Administered Medications   Medication Dose Route Frequency    warfarin (COUMADIN) tablet 5 mg  5 mg Oral ONCE    insulin lispro (HUMALOG) injection   SubCUTAneous AC&HS    glucose chewable tablet 16 g  4 Tab Oral PRN    dextrose (D50W) injection syrg 12.5-25 g  12.5-25 g IntraVENous PRN    glucagon (GLUCAGEN) injection 1 mg  1 mg IntraMUSCular PRN    0.9% sodium chloride infusion 250 mL  250 mL IntraVENous PRN    sodium bicarbonate (8.4%) 75 mEq in 0.45% sodium chloride 1,000 mL infusion   IntraVENous CONTINUOUS    simvastatin (ZOCOR) tablet 10 mg  10 mg Oral QHS    sodium chloride (NS) flush 5-10 mL  5-10 mL IntraVENous Q8H    sodium chloride (NS) flush 5-10 mL  5-10 mL IntraVENous PRN    acetaminophen (TYLENOL) tablet 650 mg  650 mg Oral Q6H    acetaminophen (TYLENOL) tablet 650 mg  650 mg Oral Q6H PRN    oxyCODONE IR (ROXICODONE) tablet 5 mg  5 mg Oral Q3H PRN    oxyCODONE IR (ROXICODONE) tablet 10 mg  10 mg Oral Q3H PRN    naloxone (NARCAN) injection 0.4 mg  0.4 mg IntraVENous PRN    hydrOXYzine HCl (ATARAX) tablet 10 mg  10 mg Oral Q8H PRN    senna-docusate (PERICOLACE) 8.6-50 mg per tablet 1 Tab  1 Tab Oral BID    polyethylene glycol (MIRALAX) packet 17 g  17 g Oral DAILY    bisacodyl (DULCOLAX) suppository 10 mg  10 mg Rectal DAILY PRN    metoprolol tartrate (LOPRESSOR) tablet 25 mg  25 mg Oral QHS    ursodiol (ACTIGALL) capsule 300 mg  300 mg Oral BID    dilTIAZem CD (CARDIZEM CD) capsule 120 mg  120 mg Oral DAILY    ferrous sulfate tablet 325 mg  325 mg Oral QHS    lactulose (CHRONULAC) solution 10 g  10 g Oral TID    rifAXIMin (XIFAXAN) tablet 550 mg  550 mg Oral TID    allopurinol (ZYLOPRIM) tablet 100 mg  100 mg Oral DAILY    pantoprazole (PROTONIX) tablet 40 mg  40 mg Oral ACB    Warfarin- pharmacy to dose Other Rx Dosing/Monitoring       Review of Systems  Pertinent items are noted in HPI. Objective:     Visit Vitals    /56 (BP 1 Location: Left arm, BP Patient Position: Standing)    Pulse 86    Temp 97.6 °F (36.4 °C)    Resp 16    Ht 6' 1\" (1.854 m)    Wt 127 kg (280 lb)    SpO2 97%    BMI 36.94 kg/m2     Temp (24hrs), Av.9 °F (36.6 °C), Min:97.6 °F (36.4 °C), Max:98.7 °F (37.1 °C)       07 -  1900  In: -   Out: 345 [Urine:300; Drains:45]    Physical Exam:   General: Alert, cooperative, no distress, appears stated age. Head:  Normocephalic, without obvious abnormality, atraumatic. Lungs: Clear to auscultation bilaterally. Heart: Regular rate and rhythm, S1, S2 normal, no S3, no rubs  Abdomen: Soft, non-tender. Bowel sounds normal. No masses, No organomegaly.    Extremities: 2+ edema LLE; RLE bangdaged   Neurologic: Grossly nonfocal       Data Review:     LABS:   Recent Results (from the past 24 hour(s))   GLUCOSE, POC    Collection Time: 17  4:21 PM   Result Value Ref Range    Glucose (POC) 173 (H) 65 - 100 mg/dL    Performed by Murfie Carte    GLUCOSE, POC    Collection Time: 17  9:25 PM   Result Value Ref Range    Glucose (POC) 220 (H) 65 - 100 mg/dL    Performed by [employer]    METABOLIC PANEL, BASIC    Collection Time: 17  2:16 AM   Result Value Ref Range    Sodium 135 (L) 136 - 145 mmol/L    Potassium 4.0 3.5 - 5.1 mmol/L    Chloride 107 97 - 108 mmol/L    CO2 19 (L) 21 - 32 mmol/L    Anion gap 9 5 - 15 mmol/L    Glucose 142 (H) 65 - 100 mg/dL    BUN 42 (H) 6 - 20 MG/DL    Creatinine 3.46 (H) 0.70 - 1.30 MG/DL    BUN/Creatinine ratio 12 12 - 20      GFR est AA 22 (L) >60 ml/min/1.73m2    GFR est non-AA 18 (L) >60 ml/min/1.73m2    Calcium 7.8 (L) 8.5 - 10.1 MG/DL   MAGNESIUM    Collection Time: 17  2:16 AM   Result Value Ref Range    Magnesium 1.6 1.6 - 2.4 mg/dL   CBC WITH AUTOMATED DIFF    Collection Time: 17  2:16 AM   Result Value Ref Range WBC 3.4 (L) 4.1 - 11.1 K/uL    RBC 2.76 (L) 4.10 - 5.70 M/uL    HGB 7.5 (L) 12.1 - 17.0 g/dL    HCT 23.1 (L) 36.6 - 50.3 %    MCV 83.7 80.0 - 99.0 FL    MCH 27.2 26.0 - 34.0 PG    MCHC 32.5 30.0 - 36.5 g/dL    RDW 18.4 (H) 11.5 - 14.5 %    PLATELET 66 (L) 425 - 400 K/uL    NEUTROPHILS 68 32 - 75 %    LYMPHOCYTES 14 12 - 49 %    MONOCYTES 13 5 - 13 %    EOSINOPHILS 4 0 - 7 %    BASOPHILS 1 0 - 1 %    ABS. NEUTROPHILS 2.4 1.8 - 8.0 K/UL    ABS. LYMPHOCYTES 0.5 (L) 0.8 - 3.5 K/UL    ABS. MONOCYTES 0.4 0.0 - 1.0 K/UL    ABS. EOSINOPHILS 0.1 0.0 - 0.4 K/UL    ABS.  BASOPHILS 0.0 0.0 - 0.1 K/UL    DF SMEAR SCANNED      RBC COMMENTS ANISOCYTOSIS  1+       PROTHROMBIN TIME + INR    Collection Time: 05/14/17  2:16 AM   Result Value Ref Range    INR 1.7 (H) 0.9 - 1.1      Prothrombin time 17.4 (H) 9.0 - 11.1 sec   HEMOGLOBIN A1C WITH EAG    Collection Time: 05/14/17  2:16 AM   Result Value Ref Range    Hemoglobin A1c 5.7 4.2 - 6.3 %    Est. average glucose 117 mg/dL   GLUCOSE, POC    Collection Time: 05/14/17  7:10 AM   Result Value Ref Range    Glucose (POC) 117 (H) 65 - 100 mg/dL    Performed by Dennis Velasco    GLUCOSE, POC    Collection Time: 05/14/17 11:30 AM   Result Value Ref Range    Glucose (POC) 166 (H) 65 - 100 mg/dL    Performed by Diana Paula          Assessment:     Principal Problem:    Failed total right knee replacement ([facility] [address] (5/11/2017)      Overview: REMOVAL OF HARDWARE & REVISION RIGHT TOTAL KNEE REPLACEMENT 5/12/17    Cirrhosis, CAMPOVERDE  DM  HTN  Gout  Staph infection of right TKR, S/P removal 5/12/17  CKD~1.8     HERBIE - Toradol and relative hypotension; creat still rising, not unexpected with Toradol    Plan:     Continue to avoid NSAIDs and hold diuretics  Decrease BP meds given relative hypotension  Serial labs

## 2017-05-14 NOTE — PROGRESS NOTES
Pharmacist Note  Warfarin Dosing  Consult provided for this 62 y.o.male to manage warfarin for Afib, s/p orthopedic surgery     INR Goal: 2 - 3    Home regimen/ tablet size: 5 mg PO daily    Drugs that may increase INR: None  Drugs that may decrease INR: None  Other current anticoagulants/ drugs that may increase bleeding risk: None  Risk factors: None  Daily INR ordered: YES    Recent Labs      05/14/17   0216  05/13/17   0511  05/13/17   0103  05/12/17   0757  05/12/17   0751   HGB  7.5*   --   7.9*   --   9.8*   INR  1.7*  1.4*   --   1.3*   --      Date               INR                  Dose  5/12                1.3                   5 mg  5/13                1.4                   5 mg  5/14                1.7                   5 mg                                                                               Assessment/ Plan: Will order warfarin 5 mg PO x 1 dose. Pharmacy will continue to monitor daily and adjust therapy as indicated. Please contact the pharmacist at  or  for outpatient recommendations if needed. Jaswant Eastman, Pharm. D., BCPS

## 2017-05-14 NOTE — ROUTINE PROCESS
2140: Paged on call nephrologist.     2145: Dr. Delmar Lara notified pt /55 & HR 71 and has an order to give metoprolol 25 mg po. Verbal order to hold med.

## 2017-05-14 NOTE — PROGRESS NOTES
Will Hold PT this am per nursing request but check back later today. Pt's HgB 7.6 after transfusion yesterday. Nurse calling MD at this time -? Transfusion needs.

## 2017-05-14 NOTE — PROGRESS NOTES
Problem: Mobility Impaired (Adult and Pediatric)  Goal: *Acute Goals and Plan of Care (Insert Text)  Physical Therapy Goals  Initiated 5/13/2017    1. Patient will move from supine to sit and sit to supine and scoot up and down in bed with modified independence within 4 days. 2. Patient will perform sit to stand with minimal assistance/contact guard assist within 4 days. 3. Patient will ambulate with modified independence for 300 feet with the least restrictive device within 4 days. 4. Patient will ascend/descend 5 stairs with 1 handrail(s) with modified independence within 4 days. 5. Patient will perform home exercise program per protocol with independence within 4 days. 6. Patient will demonstrate AROM 0-90 degrees in operative joint within 4 days. PHYSICAL THERAPY TREATMENT  Patient: Екатерина Fall III (1 y.o. male)  Date: 5/14/2017  Diagnosis: STATUS POST RIGHT TOTAL KNEE REPLACEMENT  Failed total right knee replacement, sequela Failed total right knee replacement (HCC)  Procedure(s) (LRB):  REMOVAL OF PROSTALAC SPACER, RIGHT TOTAL KNEE REVISION (Right) 2 Days Post-Op  Precautions: Fall, WBAT RLE, wear R knee immobilizer for 1 week to maintain knee extension for 1 week post op      ASSESSMENT:  Cleared by nursing to get pt up out of bed to ambulate this afternoon. Stated that pt's MD did not want to transfuse pt at this time. Pt's vitals WNL in supine, sit, and standing; pt without complaints of dizziness or fatigue and had little or no complaint of discomfort while up with PT and ambulating 25' with use of a rolling walker, WBAT RLE, wearing R knee immobilizer. Gait was slow and stable with support of the walker and min/moderate assist x 1-2. PT able to sit in a chair comfortably with RLE elevated on stool with several pillows. Nursing to assist pt back to bed when needed. Plan to continue to follow for PT BID to progress mobility as tolerated.      Progression toward goals:  [ ]      Improving appropriately and progressing toward goals  [X]      Improving slowly and progressing toward goals  [ ]      Not making progress toward goals and plan of care will be adjusted       PLAN:  Patient continues to benefit from skilled intervention to address the above impairments. Continue treatment per established plan of care. Discharge Recommendations:  HHPT  Further Equipment Recommendations for Discharge:  none       SUBJECTIVE:   Patient stated I'm ready to go. At least now and am allowed to put weight on my right le. . Pt rated pain at 0 at rest; complained of soreness in RLE upon weightbearing, stating that it was tolerable. No complaints of dizziness when up out of bed. OBJECTIVE DATA SUMMARY:   Critical Behavior:  Neurologic State: Alert           Range of Motion:          Pt to remain in RLE knee immobilizer for 1 week post op to maintain knee in full extension, per surgeon. Functional Mobility Training:  Bed Mobility:     Supine to Sit: Moderate assist to move RLE, with head of bed elevated  Sit to Supine:  (sat up in chair with RLE elevated)  Scooting: Modified independent        Transfers:  Sit to Stand: Minimum assistance;Assist x2 (with bed elevated)  Stand to Sit: Minimum assistance;Assist x2                             Balance:  Sitting: Intact  Standing: Impaired  Standing - Static: Fair  Standing - Dynamic : Fair  Ambulation/Gait Training:  Distance (ft): 25 Feet (ft)  Assistive Device: Gait belt;Walker, rolling (R knee immobilizer)  Ambulation - Level of Assistance: Moderate assistance;Assist x2 (+ assist for IVs and standby assist if needed)        Gait Abnormalities: Decreased step clearance; Antalgic        Base of Support: Widened     Speed/Renee: Slow  Step Length: Right shortened;Left shortened                                           Therapeutic Exercises:     EXERCISE   Sets   Reps   Active Active Assist   Passive Self ROM   Comments   Ankle Pumps 1 10 [X] [ ]                                        [ ]                                        [ ]                                                                                                                                                                                                                    Activity Tolerance:   Good - vitals WNL, no complaints of dizziness, or increased fatigue, no complaints of pain per pt, just some R knee soreness upon weightbearing. Pt able to tolerate ambulating 25' with a rolling walker, WBAT RLE, wearing R knee immobilizer and then sitting up in a chair with RLE elevated. Please refer to the flowsheet for vital signs taken during this treatment. After treatment:   [X] Patient left in no apparent distress sitting up in chair with RLE elevated on stool with several pillows.   [ ] Patient left in no apparent distress in bed  [X] Call bell left within reach  [X] Nursing notified  [ ] Caregiver present  [ ] Bed alarm activated      COMMUNICATION/COLLABORATION:   The patients plan of care was discussed with: Registered Nurse     Isidro Chavis PT   Time Calculation: 40 mins

## 2017-05-14 NOTE — PROGRESS NOTES
Hospitalist Progress Note  Jackie Son MD  Office: [phone]  Cell: [phone]      Date of Service:  2017  NAME:  Selam Oneil III  :  1955  MRN:  [id number]      Admission Summary:   Anusha Ribeiro is a 60 y.o. male who presents with declining renal function. Onset of symptoms was gradual with unchanged course since that time. Patient is currently admitted to the Orthopedic service and is postop post revision right total knee.  We are asked to see the patient in consult to assist in managing HERBIE.       Interval history / Subjective:   No acute complaint, no abdominal pain, last bowel movement was on      Assessment & Plan:     HERBIE on CKD  -creatinine trending up,   -on sodium bicarbonate  -diuretics, toradol and NSAID stopped  -nephrologist on board    Hx of DM-II  -finger stick glucose 117-220/24 hrs  -add sliding scale  -he said he doesn't take insulin at home  -check A1c    HTN  -BP normal, diltiazem, metoprolol is held    Leukopenia   -possible due to meds  -monitor cbc    CAMPOVERDE Cirrhosis  -on lactulose and rfaximin  -stable liver function    Anemia multifactorial  -on ferrous sulfate  -no evidence of active bleeding  -H/H trending down, monitor H/H  -transfuse for Hgb <7    Chronic thrombocytopenia likely due to liver cirrhosis  -trending down, monitor platelet  -patient on coumadin, watch for bleeding    Hx of atrial tachycardia  -rate normal, on metoprolol and diltiazem    Hx of gout  -continue on allupurinol, and ursodil    S/p removal of hardware and revision of right total knee replacement  -on vancomycin  -management per orthopedic surgeon      Code status: Full Code  DVT prophylaxis: coumadin     Care Plan discussed with: Patient/Family, Nurse and        Hospital Problems  Date Reviewed: 2017          Codes Class Noted POA    * (Principal)Failed total right knee replacement ([facility] [address] ICD-10-CM: A66.709D  ICD-9-CM: 996.47, V43.65  5/11/2017 Yes    Overview Signed 5/11/2017 11:22 PM by Cameron Diaz PA-C     REMOVAL OF HARDWARE & REVISION RIGHT TOTAL KNEE REPLACEMENT 5/12/17                     Vital Signs:    Last 24hrs VS reviewed since prior progress note. Most recent are:  Visit Vitals    BP 92/58    Pulse 71    Temp 98.7 °F (37.1 °C)    Resp 16    Ht 6' 1\" (1.854 m)    Wt 127 kg (280 lb)    SpO2 94%    BMI 36.94 kg/m2         Intake/Output Summary (Last 24 hours) at 05/14/17 0802  Last data filed at 05/14/17 8595   Gross per 24 hour   Intake              380 ml   Output              695 ml   Net             -315 ml        Physical Examination:             Constitutional:  No acute distress, cooperative, pleasant    ENT:  Oral mucous moist, oropharynx benign. Neck supple,    Resp:  CTA bilaterally. No wheezing/rhonchi/rales. No accessory muscle use   CV:  Regular rhythm, normal rate, no murmurs, gallops, rubs    GI:  Soft, non distended, non tender.  normoactive bowel sounds, no hepatosplenomegaly     Musculoskeletal: immoblizer on right leg    Neurologic:  conscious and alert, well oriented, moves extremities           Data Review:    Review and/or order of clinical lab test      Labs:     Recent Labs      05/14/17 0216 05/13/17   0103  05/12/17   0751   WBC  3.4*   --   4.5   HGB  7.5*  7.9*  9.8*   HCT  23.1*   --   30.2*   PLT  66*   --   83*     Recent Labs      05/14/17 0216 05/13/17   0103  05/12/17   0751   NA  135*  138  139   K  4.0  4.9  3.8   CL  107  109*  108   CO2  19*  20*  24   BUN  42*  34*  32*   CREA  3.46*  2.21*  1.84*   GLU  142*  118*  114*   CA  7.8*  7.7*  8.6   MG  1.6   --    --      Recent Labs      05/12/17   0751   SGOT  28   ALT  17   AP  136*   TBILI  1.8*   TP  6.8   ALB  2.9*   GLOB  3.9     Recent Labs      05/14/17   0216  05/13/17   0511  05/12/17   0757   INR  1.7*  1.4*  1.3*   PTP  17.4*  14.8*   --       No results for input(s): STEW, TIBC, PSAT, FERR in the last 72 hours. Lab Results   Component Value Date/Time    Folate 6.6 11/03/2016 02:46 AM      No results for input(s): PH, PCO2, PO2 in the last 72 hours. No results for input(s): CPK, CKNDX, TROIQ in the last 72 hours.     No lab exists for component: CPKMB  No results found for: CHOL, CHOLX, CHLST, CHOLV, HDL, LDL, DLDL, LDLC, DLDLP, TGL, TGLX, TRIGL, TRIGP, CHHD, CHHDX  Lab Results   Component Value Date/Time    Glucose (POC) 117 05/14/2017 07:10 AM    Glucose (POC) 220 05/13/2017 09:25 PM    Glucose (POC) 173 05/13/2017 04:21 PM    Glucose (POC) 152 05/13/2017 11:42 AM    Glucose (POC) 117 05/13/2017 06:23 AM     Lab Results   Component Value Date/Time    Color YELLOW/STRAW 02/13/2017 03:52 PM    Appearance CLEAR 02/13/2017 03:52 PM    Specific gravity 1.019 02/13/2017 03:52 PM    pH (UA) 5.0 02/13/2017 03:52 PM    Protein NEGATIVE  02/13/2017 03:52 PM    Glucose NEGATIVE  02/13/2017 03:52 PM    Ketone NEGATIVE  02/13/2017 03:52 PM    Bilirubin NEGATIVE  02/13/2017 03:52 PM    Urobilinogen 1.0 02/13/2017 03:52 PM    Nitrites NEGATIVE  02/13/2017 03:52 PM    Leukocyte Esterase NEGATIVE  02/13/2017 03:52 PM    Epithelial cells FEW 02/13/2017 03:52 PM    Bacteria NEGATIVE  02/13/2017 03:52 PM    WBC 0-4 02/13/2017 03:52 PM    RBC 0-5 02/13/2017 03:52 PM         Medications Reviewed:     Current Facility-Administered Medications   Medication Dose Route Frequency    warfarin (COUMADIN) tablet 5 mg  5 mg Oral ONCE    0.9% sodium chloride infusion 250 mL  250 mL IntraVENous PRN    sodium bicarbonate (8.4%) 75 mEq in 0.45% sodium chloride 1,000 mL infusion   IntraVENous CONTINUOUS    simvastatin (ZOCOR) tablet 10 mg  10 mg Oral QHS    sodium chloride (NS) flush 5-10 mL  5-10 mL IntraVENous Q8H    sodium chloride (NS) flush 5-10 mL  5-10 mL IntraVENous PRN    acetaminophen (TYLENOL) tablet 650 mg  650 mg Oral Q6H    acetaminophen (TYLENOL) tablet 650 mg  650 mg Oral Q6H PRN    oxyCODONE IR (ROXICODONE) tablet 5 mg  5 mg Oral Q3H PRN    oxyCODONE IR (ROXICODONE) tablet 10 mg  10 mg Oral Q3H PRN    naloxone (NARCAN) injection 0.4 mg  0.4 mg IntraVENous PRN    hydrOXYzine HCl (ATARAX) tablet 10 mg  10 mg Oral Q8H PRN    senna-docusate (PERICOLACE) 8.6-50 mg per tablet 1 Tab  1 Tab Oral BID    polyethylene glycol (MIRALAX) packet 17 g  17 g Oral DAILY    bisacodyl (DULCOLAX) suppository 10 mg  10 mg Rectal DAILY PRN    metoprolol tartrate (LOPRESSOR) tablet 25 mg  25 mg Oral QHS    ursodiol (ACTIGALL) capsule 300 mg  300 mg Oral BID    dilTIAZem CD (CARDIZEM CD) capsule 120 mg  120 mg Oral DAILY    ferrous sulfate tablet 325 mg  325 mg Oral QHS    lactulose (CHRONULAC) solution 10 g  10 g Oral TID    rifAXIMin (XIFAXAN) tablet 550 mg  550 mg Oral TID    allopurinol (ZYLOPRIM) tablet 100 mg  100 mg Oral DAILY    pantoprazole (PROTONIX) tablet 40 mg  40 mg Oral ACB    Warfarin- pharmacy to dose   Other Rx Dosing/Monitoring     ______________________________________________________________________  EXPECTED LENGTH OF STAY: - - -  ACTUAL LENGTH OF STAY:          2                 Nicci Grossman MD

## 2017-05-14 NOTE — CONSULTS
Hospitalist Consult  Primary Care Provider: Fausto Sidhu MD  Consult requested by: Cassandra Cueva    Subjective:     Yung Soriano is a 60 y.o. male who presents with declining renal function. Onset of symptoms was gradual with unchanged course since that time. Patient is currently admitted to the Orthopedic service and is postop day 1 status post revision right total knee. We are asked to see the patient in consult to assist in managing HERBIE. Review of patient's chart did not reveal any intraoperative or postoperative complications. At this time the patient has no complaints. He denies any headaches or dizziness. Denies any cough, chest pain, shortness of breath. Denies fever or chills. Denies nausea, vomiting, diarrhea, constipation. Review of Systems:  Further 10 point review of systems is benign. A comprehensive review of systems was negative.      Past Medical History:   Diagnosis Date    Acute kidney failure with tubular necrosis ([facility] 2017    DR Raquel Ramsey    Adverse effect of anesthesia     violent with anesthesia in 1969    Anesthesia complication 8873    violent after anesthesia     Arrhythmia     Arthritis     Atrial tachycardia ([facility]     DR Precious Andersen    Cataract     LEFT    Chronic cough 2014-present    Chronic kidney disease     elevated creatine level    Chronic pain     Cirrhosis of liver (HCC)     CKD (chronic kidney disease) stage 3, GFR 30-59 ml/min      [id number]    Coagulation defects     COUMADIN    Diabetes ([facility]     Esophageal tear 2011    Gastric varices 2016    Dr. Arsen Guido    GERD (gastroesophageal reflux disease) 2007    TORE ESOPHAGUS    Gouty arthropathy     Iron deficiency anemia     RECEIVED IRON INFUSIONS     Lymphedema     RLL    CAMPOVERDE (nonalcoholic steatohepatitis) 2016    Dr. Jose Alejandro Coughlin Spider angioma 2016    upper torso- Dr. Felton Figueroa [facility]) 1980, 1995    RLE, LLE    Umbilical hernia 4186      Past Surgical History:   Procedure Laterality Date    HX GI  2007    EGD X3 FOR ESOPHAGUS REPAIR    HX HERNIA REPAIR Right 2015    HX KNEE REPLACEMENT Right 2011    HX KNEE REPLACEMENT Right 10/31/2016    REVISION WITH SPACER    HX ORTHOPAEDIC  2010    LEFT KNEE ARTHROSCOPY    HX ORTHOPAEDIC  2011    RT WRIST, ELBOW CARTILIDGE REPAIR    HX ORTHOPAEDIC      RIGHT KNEE SURGERY  X4    HX OTHER SURGICAL Bilateral     TEAR DUCTS    HX OTHER SURGICAL Right 1978    fatty tumor arm    HX TONSILLECTOMY       Prior to Admission medications    Medication Sig Start Date End Date Taking? Authorizing Provider   oxyCODONE IR (ROXICODONE) 5 mg immediate release tablet Take 1-2 Tabs by mouth every four (4) hours as needed for Pain. Max Daily Amount: 60 mg. 5/12/17  Yes Fausto Hampton MD   dilTIAZem ER (CARDIZEM LA) 120 mg tablet Take 120 mg by mouth every morning. Yes Historical Provider   metoprolol tartrate (LOPRESSOR) 25 mg tablet Take 25 mg by mouth nightly. Yes Historical Provider   furosemide (LASIX) 40 mg tablet Take 40 mg by mouth every other day. Yes Historical Provider   ferrous sulfate (SLOW FE) 142 mg (45 mg iron) ER tablet Take  by mouth nightly. Yes Historical Provider   warfarin (COUMADIN) 5 mg tablet Take 5 mg by mouth daily. Yes Historical Provider   ursodiol (ACTIGALL) 300 mg capsule Take 300 mg by mouth two (2) times a day. Yes Historical Provider   lactulose (CHRONULAC) 10 gram/15 mL solution Take  by mouth three (3) times daily. 2 table spoons 3 times per day as needed   Yes Historical Provider   rifAXIMin (XIFAXAN) 550 mg tablet Take 550 mg by mouth three (3) times daily. Indications: HEPATIC ENCEPHALOPATHY   Yes Historical Provider   allopurinol (ZYLOPRIM) 100 mg tablet Take 100 mg by mouth daily. Yes Historical Provider   simvastatin (ZOCOR) 40 mg tablet Take 40 mg by mouth nightly. Yes Historical Provider   Omeprazole delayed release (PRILOSEC D/R) 20 mg tablet Take 20 mg by mouth daily. Yes Historical Provider     Allergies   Allergen Reactions    Pcn [Penicillins] Anaphylaxis and Shortness of Breath    Atropine Other (comments)     UNSURE OF REACTION, 48 YRS AGO      Family History   Problem Relation Age of Onset   Harl Rack Stroke Mother     Diabetes Mother     Cancer Mother      UNKNOWN    Arthritis-osteo Father     Bleeding Prob Father      DVT    Diabetes Father     Heart Attack Father     Thyroid Disease Sister     No Known Problems Brother     Obesity Son     Bleeding Prob Son 28     DVT     Other Son      SYNCOPE D/T UNKNOWN NERVE DISORDER    Anesth Problems Neg Hx         SOCIAL HISTORY:  Patient resides at Home. Patient ambulates with out assist.   Smoking history: never  Alcohol history: no    Objective:       Physical Exam:   Visit Vitals    /55    Pulse 71    Temp 97.6 °F (36.4 °C)    Resp 16    Ht 6' 1\" (1.854 m)    Wt 127 kg (280 lb)    SpO2 95%    BMI 36.94 kg/m2     General appearance: alert, cooperative, no distress, appears stated age  Lungs: clear to auscultation bilaterally  Heart: regular rate and rhythm, S1, S2 normal, no murmur, click, rub or gallop  Abdomen: soft, non-tender. Bowel sounds normal. No masses,  no organomegaly  Extremities: extremities normal, atraumatic, no cyanosis or edema  Neurologic: Grossly normal    Data Review: All diagnostic labs and studies have been reviewed. Assessment:   Gi Giron III is a 60 y.o. male who presents with HERBIE. We are asked to see the patient in consult to assist in managing HERBIE. Principal Problem:    Failed total right knee replacement ([address] (5/11/2017)      Overview: REMOVAL OF HARDWARE & REVISION RIGHT TOTAL KNEE REPLACEMENT 5/12/17        Plan:     1. HERBIE: Seen by Nephrology, on IVF and monitoring renal function. Will continue to follow.       Signed By: Naima Loomis MD     May 14, 2017

## 2017-05-14 NOTE — PROGRESS NOTES
S: pain controlled, creatnine increaesed    O:  Patient Vitals for the past 12 hrs:   Temp Pulse Resp BP SpO2   05/14/17 0208 98.7 °F (37.1 °C) 71 16 92/58 94 %   05/13/17 2128 - 71 16 109/55 95 %   05/13/17 2018 97.6 °F (36.4 °C) 71 16 106/62 95 %     Recent Results (from the past 12 hour(s))   GLUCOSE, POC    Collection Time: 05/13/17  9:25 PM   Result Value Ref Range    Glucose (POC) 220 (H) 65 - 100 mg/dL    Performed by Nancy Gustafson    METABOLIC PANEL, BASIC    Collection Time: 05/14/17  2:16 AM   Result Value Ref Range    Sodium 135 (L) 136 - 145 mmol/L    Potassium 4.0 3.5 - 5.1 mmol/L    Chloride 107 97 - 108 mmol/L    CO2 19 (L) 21 - 32 mmol/L    Anion gap 9 5 - 15 mmol/L    Glucose 142 (H) 65 - 100 mg/dL    BUN 42 (H) 6 - 20 MG/DL    Creatinine 3.46 (H) 0.70 - 1.30 MG/DL    BUN/Creatinine ratio 12 12 - 20      GFR est AA 22 (L) >60 ml/min/1.73m2    GFR est non-AA 18 (L) >60 ml/min/1.73m2    Calcium 7.8 (L) 8.5 - 10.1 MG/DL   MAGNESIUM    Collection Time: 05/14/17  2:16 AM   Result Value Ref Range    Magnesium 1.6 1.6 - 2.4 mg/dL   CBC WITH AUTOMATED DIFF    Collection Time: 05/14/17  2:16 AM   Result Value Ref Range    WBC 3.4 (L) 4.1 - 11.1 K/uL    RBC 2.76 (L) 4.10 - 5.70 M/uL    HGB 7.5 (L) 12.1 - 17.0 g/dL    HCT 23.1 (L) 36.6 - 50.3 %    MCV 83.7 80.0 - 99.0 FL    MCH 27.2 26.0 - 34.0 PG    MCHC 32.5 30.0 - 36.5 g/dL    RDW 18.4 (H) 11.5 - 14.5 %    PLATELET 66 (L) 599 - 400 K/uL    NEUTROPHILS 68 32 - 75 %    LYMPHOCYTES 14 12 - 49 %    MONOCYTES 13 5 - 13 %    EOSINOPHILS 4 0 - 7 %    BASOPHILS 1 0 - 1 %    ABS. NEUTROPHILS 2.4 1.8 - 8.0 K/UL    ABS. LYMPHOCYTES 0.5 (L) 0.8 - 3.5 K/UL    ABS. MONOCYTES 0.4 0.0 - 1.0 K/UL    ABS. EOSINOPHILS 0.1 0.0 - 0.4 K/UL    ABS.  BASOPHILS 0.0 0.0 - 0.1 K/UL    DF SMEAR SCANNED      RBC COMMENTS ANISOCYTOSIS  1+       PROTHROMBIN TIME + INR    Collection Time: 05/14/17  2:16 AM   Result Value Ref Range    INR 1.7 (H) 0.9 - 1.1      Prothrombin time 17.4 (H) 9.0 - 11.1 sec   GLUCOSE, POC    Collection Time: 05/14/17  7:10 AM   Result Value Ref Range    Glucose (POC) 117 (H) 65 - 100 mg/dL    Performed by Ethan Krueger    RLE: moving toes, dressing intact, drain intact. Knee immobilizer on right leg.      POD 2 RRTKR  PT/OT  DVT pprx  Nephrology managing elevated creatnine  Ok to transfuse per ortho if requested by nephrology  DC priest unless needed for fluid management  Continue hemovac

## 2017-05-14 NOTE — PROGRESS NOTES
Olya Lopez (Nephrology). Updated on lab results: Hgb 7.5, Hct 23, Na 135, , Creat 3.46, BUN 42, 24 hour urine output 600. No new orders at this time per Dr. Kristine Lopez (Nephrology).

## 2017-05-15 LAB
ANION GAP BLD CALC-SCNC: 5 MMOL/L (ref 5–15)
BASOPHILS # BLD AUTO: 0 K/UL (ref 0–0.1)
BASOPHILS # BLD: 1 % (ref 0–1)
BUN SERPL-MCNC: 42 MG/DL (ref 6–20)
BUN/CREAT SERPL: 14 (ref 12–20)
CALCIUM SERPL-MCNC: 7.3 MG/DL (ref 8.5–10.1)
CHLORIDE SERPL-SCNC: 106 MMOL/L (ref 97–108)
CO2 SERPL-SCNC: 25 MMOL/L (ref 21–32)
CREAT SERPL-MCNC: 3.05 MG/DL (ref 0.7–1.3)
DIFFERENTIAL METHOD BLD: ABNORMAL
EOSINOPHIL # BLD: 0.2 K/UL (ref 0–0.4)
EOSINOPHIL NFR BLD: 5 % (ref 0–7)
ERYTHROCYTE [DISTWIDTH] IN BLOOD BY AUTOMATED COUNT: 18.2 % (ref 11.5–14.5)
GLUCOSE BLD STRIP.AUTO-MCNC: 127 MG/DL (ref 65–100)
GLUCOSE BLD STRIP.AUTO-MCNC: 147 MG/DL (ref 65–100)
GLUCOSE BLD STRIP.AUTO-MCNC: 162 MG/DL (ref 65–100)
GLUCOSE BLD STRIP.AUTO-MCNC: 188 MG/DL (ref 65–100)
GLUCOSE SERPL-MCNC: 127 MG/DL (ref 65–100)
HCT VFR BLD AUTO: 23.4 % (ref 36.6–50.3)
HGB BLD-MCNC: 7.7 G/DL (ref 12.1–17)
INR PPP: 1.7 (ref 0.9–1.1)
LYMPHOCYTES # BLD AUTO: 19 % (ref 12–49)
LYMPHOCYTES # BLD: 0.7 K/UL (ref 0.8–3.5)
MAGNESIUM SERPL-MCNC: 1.6 MG/DL (ref 1.6–2.4)
MCH RBC QN AUTO: 27.3 PG (ref 26–34)
MCHC RBC AUTO-ENTMCNC: 32.9 G/DL (ref 30–36.5)
MCV RBC AUTO: 83 FL (ref 80–99)
MONOCYTES # BLD: 0.5 K/UL (ref 0–1)
MONOCYTES NFR BLD AUTO: 13 % (ref 5–13)
NEUTS SEG # BLD: 2.3 K/UL (ref 1.8–8)
NEUTS SEG NFR BLD AUTO: 62 % (ref 32–75)
PLATELET # BLD AUTO: 82 K/UL (ref 150–400)
POTASSIUM SERPL-SCNC: 4.1 MMOL/L (ref 3.5–5.1)
PROTHROMBIN TIME: 17.6 SEC (ref 9–11.1)
RBC # BLD AUTO: 2.82 M/UL (ref 4.1–5.7)
RBC MORPH BLD: ABNORMAL
SERVICE CMNT-IMP: ABNORMAL
SODIUM SERPL-SCNC: 136 MMOL/L (ref 136–145)
WBC # BLD AUTO: 3.7 K/UL (ref 4.1–11.1)

## 2017-05-15 PROCEDURE — 74011250637 HC RX REV CODE- 250/637: Performed by: PHYSICIAN ASSISTANT

## 2017-05-15 PROCEDURE — 36430 TRANSFUSION BLD/BLD COMPNT: CPT

## 2017-05-15 PROCEDURE — 36415 COLL VENOUS BLD VENIPUNCTURE: CPT | Performed by: INTERNAL MEDICINE

## 2017-05-15 PROCEDURE — 74011250637 HC RX REV CODE- 250/637: Performed by: NURSE PRACTITIONER

## 2017-05-15 PROCEDURE — 85025 COMPLETE CBC W/AUTO DIFF WBC: CPT | Performed by: INTERNAL MEDICINE

## 2017-05-15 PROCEDURE — 74011636637 HC RX REV CODE- 636/637: Performed by: HOSPITALIST

## 2017-05-15 PROCEDURE — 74011000258 HC RX REV CODE- 258: Performed by: INTERNAL MEDICINE

## 2017-05-15 PROCEDURE — 82962 GLUCOSE BLOOD TEST: CPT

## 2017-05-15 PROCEDURE — 65270000029 HC RM PRIVATE

## 2017-05-15 PROCEDURE — 80048 BASIC METABOLIC PNL TOTAL CA: CPT | Performed by: INTERNAL MEDICINE

## 2017-05-15 PROCEDURE — P9016 RBC LEUKOCYTES REDUCED: HCPCS | Performed by: ORTHOPAEDIC SURGERY

## 2017-05-15 PROCEDURE — 74011000250 HC RX REV CODE- 250: Performed by: INTERNAL MEDICINE

## 2017-05-15 PROCEDURE — 97530 THERAPEUTIC ACTIVITIES: CPT

## 2017-05-15 PROCEDURE — 97116 GAIT TRAINING THERAPY: CPT

## 2017-05-15 PROCEDURE — 74011250637 HC RX REV CODE- 250/637: Performed by: ORTHOPAEDIC SURGERY

## 2017-05-15 PROCEDURE — 83735 ASSAY OF MAGNESIUM: CPT | Performed by: INTERNAL MEDICINE

## 2017-05-15 PROCEDURE — 85610 PROTHROMBIN TIME: CPT | Performed by: INTERNAL MEDICINE

## 2017-05-15 RX ORDER — SODIUM CHLORIDE 9 MG/ML
250 INJECTION, SOLUTION INTRAVENOUS AS NEEDED
Status: DISCONTINUED | OUTPATIENT
Start: 2017-05-15 | End: 2017-05-22 | Stop reason: HOSPADM

## 2017-05-15 RX ADMIN — DOCUSATE SODIUM AND SENNOSIDES 1 TABLET: 8.6; 5 TABLET, FILM COATED ORAL at 17:24

## 2017-05-15 RX ADMIN — INSULIN LISPRO 2 UNITS: 100 INJECTION, SOLUTION INTRAVENOUS; SUBCUTANEOUS at 17:24

## 2017-05-15 RX ADMIN — Medication 325 MG: at 21:35

## 2017-05-15 RX ADMIN — RIFAXIMIN 550 MG: 550 TABLET ORAL at 17:24

## 2017-05-15 RX ADMIN — URSODIOL 300 MG: 300 CAPSULE ORAL at 17:28

## 2017-05-15 RX ADMIN — ACETAMINOPHEN 650 MG: 325 TABLET, FILM COATED ORAL at 13:01

## 2017-05-15 RX ADMIN — DOCUSATE SODIUM AND SENNOSIDES 1 TABLET: 8.6; 5 TABLET, FILM COATED ORAL at 10:01

## 2017-05-15 RX ADMIN — METOPROLOL TARTRATE 25 MG: 25 TABLET ORAL at 21:35

## 2017-05-15 RX ADMIN — SODIUM CHLORIDE: 450 INJECTION, SOLUTION INTRAVENOUS at 06:04

## 2017-05-15 RX ADMIN — LACTULOSE 10 G: 10 SOLUTION ORAL at 23:46

## 2017-05-15 RX ADMIN — SIMVASTATIN 10 MG: 10 TABLET, FILM COATED ORAL at 21:35

## 2017-05-15 RX ADMIN — URSODIOL 300 MG: 300 CAPSULE ORAL at 10:01

## 2017-05-15 RX ADMIN — WARFARIN SODIUM 7.5 MG: 2.5 TABLET ORAL at 13:01

## 2017-05-15 RX ADMIN — INSULIN LISPRO 2 UNITS: 100 INJECTION, SOLUTION INTRAVENOUS; SUBCUTANEOUS at 13:01

## 2017-05-15 RX ADMIN — ACETAMINOPHEN 650 MG: 325 TABLET, FILM COATED ORAL at 17:24

## 2017-05-15 RX ADMIN — PANTOPRAZOLE SODIUM 40 MG: 40 TABLET, DELAYED RELEASE ORAL at 06:29

## 2017-05-15 RX ADMIN — ALLOPURINOL 100 MG: 100 TABLET ORAL at 10:01

## 2017-05-15 RX ADMIN — DILTIAZEM HYDROCHLORIDE 120 MG: 120 CAPSULE, EXTENDED RELEASE ORAL at 09:00

## 2017-05-15 RX ADMIN — ACETAMINOPHEN 650 MG: 325 TABLET, FILM COATED ORAL at 23:20

## 2017-05-15 RX ADMIN — RIFAXIMIN 550 MG: 550 TABLET ORAL at 21:35

## 2017-05-15 RX ADMIN — ACETAMINOPHEN 650 MG: 325 TABLET, FILM COATED ORAL at 06:28

## 2017-05-15 RX ADMIN — Medication 10 ML: at 17:25

## 2017-05-15 RX ADMIN — RIFAXIMIN 550 MG: 550 TABLET ORAL at 10:01

## 2017-05-15 NOTE — PROGRESS NOTES
Nephrology Progress Note  Edgar Velazquez III  Date of Admission : 5/12/2017    CC: Follow up for HERBIE       Assessment and Plan     HERBIE on CKD:  - 2/2 toradol + ATN from hypotension  - Cr starting to improve  - d/c IVF today  - daily labs    CKD III:  - baseline Cr around 1.8  - likely from chronic HTN and DM2    Anemia:  - getting 2 units of blood today    Cirrhosis, CAMPOVERDE    DM2:  - per primary service    HTN:  - BP stable    Gout:  - cont allopurinol    Staph infection of right TKR, S/P removal 5/12/17       Interval History:  Seen and examined. C/o scrotal edema today. Getting 2 units of blood. No cp, sob, n/v/d. Cr improving today. UOP stable. Current Medications: all current  Medications have been eviewed in EPIC  Review of Systems: Pertinent items are noted in HPI. Objective:  Vitals:    Vitals:    05/15/17 0952 05/15/17 0957 05/15/17 1043 05/15/17 1103   BP: 116/68 118/74 116/60 133/64   Pulse: 75 79 78 74   Resp:   16 16   Temp:   98 °F (36.7 °C) 98.4 °F (36.9 °C)   SpO2:       Weight:       Height:         Intake and Output:     05/13 1901 - 05/15 0700  In: -   Out: 2690 [Urine:2450; Drains:240]    Physical Examination:  General: NAD,Conversant   Neck:  Supple, no mass  Resp:  Lungs CTA B/L, no wheezing , normal respiratory effort  CV:  RRR,  no murmur or rub, 3+ b/l LE edema  GI:  Soft, NT, + Bowel sounds, no hepatosplenomegaly  Neurologic:  Non focal  Psych:             AAO x 3 appropriate affect   Skin:  No Rash  :  Echavarria in place, + scrotal edema    []    High complexity decision making was performed  []    Patient is at high-risk of decompensation with multiple organ involvement    Lab Data Personally Reviewed: I have reviewed all the pertinent labs, microbiology data and radiology studies during assessment.     Recent Labs      05/15/17   0633  05/14/17   0216  05/13/17   0511  05/13/17   0103   NA  136  135*   --   138   K  4.1  4.0   --   4.9   CL  106  107   --   109*   CO2  25 19*   --   20*   GLU  127*  142*   --   118*   BUN  42*  42*   --   34*   CREA  3.05*  3.46*   --   2.21*   CA  7.3*  7.8*   --   7.7*   MG  1.6  1.6   --    --    INR  1.7*  1.7*  1.4*   --      Recent Labs      05/15/17   0633  05/14/17   0216  05/13/17   0103   WBC  3.7*  3.4*   --    HGB  7.7*  7.5*  7.9*   HCT  23.4*  23.1*   --    PLT  82*  66*   --      Lab Results   Component Value Date/Time    Specimen Description: URINE 03/15/2012 11:55 AM     Lab Results   Component Value Date/Time    Culture result: MRSA NOT PRESENT 02/13/2017 03:15 PM    Culture result:  02/13/2017 03:15 PM         Screening of patient nares for MRSA is for surveillance purposes and, if positive, to facilitate isolation considerations in high risk settings. It is not intended for automatic decolonization interventions per se as regimens are not sufficiently effective to warrant routine use.     Culture result: NO GROWTH ON SOLID MEDIA AT 14 DAYS 11/10/2016 04:05 PM    Culture result:  11/10/2016 04:05 PM     STAPHYLOCOCCUS EPIDERMIDIS  ISOLATED FROM THIO BROTH ONLY      Culture result:  11/10/2016 04:05 PM     PRELIMINARY RESULT OF GRAM POSITIVE COCCI IN CLUSTERS  SEEN ON GRAM STAIN OF THE THIO BROTH  CALLED TO AND READ BACK BY  Kelvin Higgins AT 1054, 11/13/16 DB      Culture result: (THERE IS NO AEROBIC CULTURE ORDER) 11/10/2016 04:05 PM     Recent Results (from the past 24 hour(s))   GLUCOSE, POC    Collection Time: 05/14/17 11:30 AM   Result Value Ref Range    Glucose (POC) 166 (H) 65 - 100 mg/dL    Performed by Indy Roman, POC    Collection Time: 05/14/17  4:39 PM   Result Value Ref Range    Glucose (POC) 147 (H) 65 - 100 mg/dL    Performed by CASSIA BURROWS    GLUCOSE, POC    Collection Time: 05/14/17 10:37 PM   Result Value Ref Range    Glucose (POC) 194 (H) 65 - 100 mg/dL    Performed by CASSIA BURROWS    METABOLIC PANEL, BASIC    Collection Time: 05/15/17  6:33 AM   Result Value Ref Range    Sodium 136 136 - 145 mmol/L Potassium 4.1 3.5 - 5.1 mmol/L    Chloride 106 97 - 108 mmol/L    CO2 25 21 - 32 mmol/L    Anion gap 5 5 - 15 mmol/L    Glucose 127 (H) 65 - 100 mg/dL    BUN 42 (H) 6 - 20 MG/DL    Creatinine 3.05 (H) 0.70 - 1.30 MG/DL    BUN/Creatinine ratio 14 12 - 20      GFR est AA 25 (L) >60 ml/min/1.73m2    GFR est non-AA 21 (L) >60 ml/min/1.73m2    Calcium 7.3 (L) 8.5 - 10.1 MG/DL   MAGNESIUM    Collection Time: 05/15/17  6:33 AM   Result Value Ref Range    Magnesium 1.6 1.6 - 2.4 mg/dL   CBC WITH AUTOMATED DIFF    Collection Time: 05/15/17  6:33 AM   Result Value Ref Range    WBC 3.7 (L) 4.1 - 11.1 K/uL    RBC 2.82 (L) 4.10 - 5.70 M/uL    HGB 7.7 (L) 12.1 - 17.0 g/dL    HCT 23.4 (L) 36.6 - 50.3 %    MCV 83.0 80.0 - 99.0 FL    MCH 27.3 26.0 - 34.0 PG    MCHC 32.9 30.0 - 36.5 g/dL    RDW 18.2 (H) 11.5 - 14.5 %    PLATELET 82 (L) 801 - 400 K/uL    NEUTROPHILS 62 32 - 75 %    LYMPHOCYTES 19 12 - 49 %    MONOCYTES 13 5 - 13 %    EOSINOPHILS 5 0 - 7 %    BASOPHILS 1 0 - 1 %    ABS. NEUTROPHILS 2.3 1.8 - 8.0 K/UL    ABS. LYMPHOCYTES 0.7 (L) 0.8 - 3.5 K/UL    ABS. MONOCYTES 0.5 0.0 - 1.0 K/UL    ABS. EOSINOPHILS 0.2 0.0 - 0.4 K/UL    ABS. BASOPHILS 0.0 0.0 - 0.1 K/UL    DF SMEAR SCANNED      RBC COMMENTS ANISOCYTOSIS  1+       PROTHROMBIN TIME + INR    Collection Time: 05/15/17  6:33 AM   Result Value Ref Range    INR 1.7 (H) 0.9 - 1.1      Prothrombin time 17.6 (H) 9.0 - 11.1 sec   GLUCOSE, POC    Collection Time: 05/15/17  7:50 AM   Result Value Ref Range    Glucose (POC) 127 (H) 65 - 100 mg/dL    Performed by Freddie Mishra MD  [address]  Phone - [phone]   Fax - [phone]  [email or web address]

## 2017-05-15 NOTE — PROGRESS NOTES
Care Management Interventions  PCP Verified by CM: Yes (Dr. Demetria Melton)  Palliative Care Consult (Criteria: CHF and RRAT>21): No  Reason for No Palliative Care Consult: Other (see comment) (no needs)  Mode of Transport at Discharge: Other (see comment)  Transition of Care Consult (CM Consult): Discharge Planning  [facility] [address] [address]: No  Reason Outside [place]: Patient already serviced by other home care/hospice agency (agency to be determined)  MyChart Signup: No  Discharge Durable Medical Equipment: No  Physical Therapy Consult: Yes  Occupational Therapy Consult: Yes  Speech Therapy Consult: No  Current Support Network: Lives with Spouse  Confirm Follow Up Transport: Family  Plan discussed with Pt/Family/Caregiver: Yes  Freedom of Choice Offered: Yes  Discharge Location  Discharge Placement: Home with home health    Chart reviewed. Home Care orders noted. CRM attempted to meet with the patient. He was getting blood and sleeping at the time. CRM will meet with the patient in the am for discharge planning.  SIGIFREDO

## 2017-05-15 NOTE — PROGRESS NOTES
Problem: Mobility Impaired (Adult and Pediatric)  Goal: *Acute Goals and Plan of Care (Insert Text)  Physical Therapy Goals  Initiated 5/13/2017    1. Patient will move from supine to sit and sit to supine and scoot up and down in bed with modified independence within 4 days. 2. Patient will perform sit to stand with minimal assistance/contact guard assist within 4 days. 3. Patient will ambulate with modified independence for 300 feet with the least restrictive device within 4 days. 4. Patient will ascend/descend 5 stairs with 1 handrail(s) with modified independence within 4 days. 5. Patient will perform home exercise program per protocol with independence within 4 days. 6. Patient will demonstrate AROM 0-90 degrees in operative joint within 4 days. PHYSICAL THERAPY TREATMENT  Patient: Gi Giron III (10 y.o. male)  Date: 5/15/2017  Diagnosis: STATUS POST RIGHT TOTAL KNEE REPLACEMENT  Failed total right knee replacement, sequela Failed total right knee replacement (HCC)  Procedure(s) (LRB):  REMOVAL OF PROSTALAC SPACER, RIGHT TOTAL KNEE REVISION (Right) 3 Days Post-Op  Precautions: Fall, WBAT  Chart, physical therapy assessment, plan of care and goals were reviewed. ASSESSMENT:  Pt received supine in bed w/ HOB elevated and knee immobilizer donned; pt agreeable to PT. Pt reports no pain at rest. VS WNL throughout session. Pt continues to require Mod A and additional time for RLE management OOB (sitting balance intact). Pt sit>stand from elevated bed w/ Min-Mod A x1 and additional time to stand upright and for hand placement on RW (pt continued to demonstrate flexed posture at hips). Demonstrated Wide JOEY w/standing and gait, but no LOB or knee buckling noted. Pt reports full WBing through RLE and soreness w/ gait ( 30' x2,RW CGA). Pt reports fatigue in LLE and requesting to return to room.  Pt returned to sitting in hip chair (pt reporting previous chair too low and having difficulty transferring out of chair). Pt RLE elevated on stool w/ pillow. Pt reports \"it hurts in the incision, like a scrape\" after mobilization. Pt needs, met items in reach. Will continue w/ afternoon PT. Progression toward goals:  [ ]      Improving appropriately and progressing toward goals  [X]      Improving slowly and progressing toward goals  [ ]      Not making progress toward goals and plan of care will be adjusted       PLAN:  Patient continues to benefit from skilled intervention to address the above impairments. Continue treatment per established plan of care. Discharge Recommendations:  Home Health  Further Equipment Recommendations for Discharge:  rolling walker, pt owns       SUBJECTIVE:   Patient stated Let me call you back, there's a thief here to steal my blood pressure.  (pt on phone). Pt reports minimal pain in incision area and soreness w/ standing and gait. OBJECTIVE DATA SUMMARY:   Critical Behavior:  Neurologic State: Alert           Range of Motion:      Knee immobilizer donned, unable to assess                    Functional Mobility Training:  Bed Mobility:     Supine to Sit: Moderate assistance; Additional time;Assist x1 (for RLE OOB)  Sit to Supine:  (pt returned to chair)                       Transfers:  Sit to Stand: Moderate assistance; Adaptive equipment; Additional time;Assist x1  Stand to Sit: Contact guard assistance; Additional time                             Balance:  Sitting: Intact  Standing: Intact; With support  Ambulation/Gait Training:  Distance (ft): 30 Feet (ft) (x2)  Assistive Device: Gait belt;Walker, rolling  Ambulation - Level of Assistance: Contact guard assistance        Gait Abnormalities: Antalgic;Decreased step clearance; Step to gait        Base of Support: Widened     Speed/Renee: Slow  Step Length: Left shortened;Right shortened                    Stairs:            Therapeutic Exercises:     EXERCISE   Sets   Reps   Active Active Assist   Passive Self ROM   Comments Ankle Pumps     [X]                                [ ]                                [ ]                                [ ]                                RLE   Quad Sets     [ ]                                [ ]                                [ ]                                [ ]                                    Hamstring Sets     [ ]                                [ ]                                [ ]                                [ ]                                    Isaura Carlin     [ ]                                [ ]                                [ ]                                [ ]                                    Kanchan Orozco       [ ]                                  [ ]                                  [ ]                                  [ ]                                    Vero Martins     [ ]                                [ ]                                [ ]                                [ ]                                    Ronnie Gandara     [ ]                                [ ]                                [ ]                                [ ]                                    Asher Mason     [ ]                                [ ]                                [ ]                                [ ]                                    Montrell Talbot     [ ]                                [ ]                                [ ]                                [ ]                                          Pain:  Pain Scale 1: Numeric (0 - 10)  Pain Intensity 1: 0              Activity Tolerance:   Limited  Please refer to the flowsheet for vital signs taken during this treatment.   After treatment:   [X] Patient left in no apparent distress sitting up in chair  [ ] Patient left in no apparent distress in bed  [X] Call bell left within reach  [X] Nursing notified  [ ] Caregiver present  [ ] Bed alarm activated      COMMUNICATION/COLLABORATION:   The patients plan of care was discussed with: Registered Nurse Miguel Oropeza,PTA   Time Calculation: 33 mins

## 2017-05-15 NOTE — ROUTINE PROCESS
Bedside and Verbal shift change report given to Carol Moreland RN by Kiah Handley RN and Fermin Adames RN. Report included the following information SBAR, Kardex, Procedure Summary, Intake/Output, MAR and Recent Results.

## 2017-05-15 NOTE — PROGRESS NOTES
Bedside shift change report given to [place] [id number] (oncoming nurse) by Sarahi Garcia RN (offgoing nurse). Report included the following information SBAR, OR Summary, Procedure Summary, Intake/Output, MAR and Recent Results.

## 2017-05-15 NOTE — PROGRESS NOTES
Spiritual Care Partner Volunteer visited patient in [address] [place] [address] on 5/15/17. Documented by:  KANNAN Wyman

## 2017-05-15 NOTE — PROGRESS NOTES
Physical Therapy    Followed up w/ pt for afternoon PT. Pt receiving blood transfusion. Will hold therapy for afternoon. Will follow up w/ pt as able and appropriate.      Loulou Means, PTA

## 2017-05-15 NOTE — PROGRESS NOTES
Pharmacist Note  Warfarin Dosing  Consult provided for this 62 y.o.male to manage warfarin for Afib, s/p orthopedic surgery     INR Goal: 2 - 3  Home regimen/ tablet size: 5 mg PO daily    Drugs that may increase INR: None  Drugs that may decrease INR: None  Other current anticoagulants/ drugs that may increase bleeding risk: None  Risk factors: None  Daily INR ordered: YES  Recent Labs      05/15/17   0633  05/14/17   0216  05/13/17   0511  05/13/17   0103   HGB  7.7*  7.5*   --   7.9*   INR  1.7*  1.7*  1.4*   --      Date               INR                  Dose  5/12                1.3                   5 mg  5/13                1.4                   5 mg  5/14                1.7                   5 mg  5/15  1.7  7.5 mg                                                                               Assessment/ Plan: Will order warfarin 7.5 mg PO x 1 dose. Not yet back in goal INR Range. Pharmacy will continue to monitor daily and adjust therapy as indicated. Please contact the pharmacist at  or  for outpatient recommendations if needed.

## 2017-05-15 NOTE — PROGRESS NOTES
Hospitalist Progress Note  Sheryl Martínez MD  Office: [phone]  Cell: [phone]      Date of Service:  5/15/2017  NAME:  Sonia Cunningham III  :  1955  MRN:  [id number]      Admission Summary:   Rock Lemus is a 60 y.o. male who presents with declining renal function. Onset of symptoms was gradual with unchanged course since that time. Patient is currently admitted to the Orthopedic service and is postop post revision right total knee.  We are asked to see the patient in consult to assist in managing HERBIE.       Interval history / Subjective:   No acute complaint, no abdominal pain     Assessment & Plan:     HERBIE on CKD  -creatinine improving, IVF discontinued on 5/15  -on blood transfusion  -diuretics, toradol and NSAID stopped  -nephrologist on board    Hx of DM-II  -finger stick glucose 117-194/24 hrs  -continue sliding scale  -he said he doesn't take insulin at home  -A1c 5.7  -continue diet control    HTN  -on diltiazem and metoprolol, monitor BP    Leukopenia   -possible due to meds, improving  -monitor cbc    CAMPOVERDE Cirrhosis  -on lactulose and rfaximin  -stable liver function    Anemia multifactorial  -on ferrous sulfate  -no evidence of active bleeding  -H/H trending down, 2 units PRBC on 5/15, monitor H/H      Chronic thrombocytopenia likely due to liver cirrhosis  -improving, monitor platelet  -patient on coumadin, watch for bleeding    Hx of atrial tachycardia  -rate normal, on metoprolol and diltiazem    Hx of gout  -continue on allupurinol, and ursodil    S/p removal of hardware and revision of right total knee replacement  -on vancomycin  -management per orthopedic surgeon      Code status: Full Code  DVT prophylaxis: coumadin     Care Plan discussed with: Patient/Family, Nurse and        Hospital Problems  Date Reviewed: 2017          Codes Class Noted POA    * (Principal)Failed total right knee replacement [facility]) ICD-10-CM: W09.486X  ICD-9-CM: 996.47, V43.65  5/11/2017 Yes    Overview Signed 5/11/2017 11:22 PM by Kina Cuellar PA-C     REMOVAL OF HARDWARE & REVISION RIGHT TOTAL KNEE REPLACEMENT 5/12/17                     Vital Signs:    Last 24hrs VS reviewed since prior progress note. Most recent are:  Visit Vitals    /64    Pulse 74    Temp 98.4 °F (36.9 °C)    Resp 16    Ht 6' 1\" (1.854 m)    Wt 127 kg (280 lb)    SpO2 95%    BMI 36.94 kg/m2         Intake/Output Summary (Last 24 hours) at 05/15/17 1443  Last data filed at 05/15/17 0604   Gross per 24 hour   Intake                0 ml   Output             1545 ml   Net            -1545 ml        Physical Examination:             Constitutional:  No acute distress, cooperative, pleasant    ENT:  Oral mucous moist, oropharynx benign. Neck supple,    Resp:  CTA bilaterally. No wheezing/rhonchi/rales. No accessory muscle use   CV:  Regular rhythm, normal rate, no murmurs, gallops, rubs    GI:  Soft, non distended, non tender. normoactive bowel sounds, no hepatosplenomegaly     Musculoskeletal: immoblizer on right leg    Neurologic:  conscious and alert, well oriented, moves extremities           Data Review:    Review and/or order of clinical lab test      Labs:     Recent Labs      05/15/17   0633  05/14/17   0216   WBC  3.7*  3.4*   HGB  7.7*  7.5*   HCT  23.4*  23.1*   PLT  82*  66*     Recent Labs      05/15/17   0633  05/14/17   0216  05/13/17   0103   NA  136  135*  138   K  4.1  4.0  4.9   CL  106  107  109*   CO2  25  19*  20*   BUN  42*  42*  34*   CREA  3.05*  3.46*  2.21*   GLU  127*  142*  118*   CA  7.3*  7.8*  7.7*   MG  1.6  1.6   --      No results for input(s): SGOT, GPT, ALT, AP, TBIL, TBILI, TP, ALB, GLOB, GGT, AML, LPSE in the last 72 hours.     No lab exists for component: AMYP, HLPSE  Recent Labs      05/15/17   0633  05/14/17   0216  05/13/17   0511   INR  1.7*  1.7*  1.4*   PTP  17.6*  17.4*  14.8*      No results for input(s): FE, TIBC, PSAT, FERR in the last 72 hours. Lab Results   Component Value Date/Time    Folate 6.6 11/03/2016 02:46 AM      No results for input(s): PH, PCO2, PO2 in the last 72 hours. No results for input(s): CPK, CKNDX, TROIQ in the last 72 hours.     No lab exists for component: CPKMB  No results found for: CHOL, CHOLX, CHLST, CHOLV, HDL, LDL, DLDL, LDLC, DLDLP, TGL, TGLX, TRIGL, TRIGP, CHHD, CHHDX  Lab Results   Component Value Date/Time    Glucose (POC) 147 05/15/2017 12:00 PM    Glucose (POC) 127 05/15/2017 07:50 AM    Glucose (POC) 194 05/14/2017 10:37 PM    Glucose (POC) 147 05/14/2017 04:39 PM    Glucose (POC) 166 05/14/2017 11:30 AM     Lab Results   Component Value Date/Time    Color YELLOW/STRAW 02/13/2017 03:52 PM    Appearance CLEAR 02/13/2017 03:52 PM    Specific gravity 1.019 02/13/2017 03:52 PM    pH (UA) 5.0 02/13/2017 03:52 PM    Protein NEGATIVE  02/13/2017 03:52 PM    Glucose NEGATIVE  02/13/2017 03:52 PM    Ketone NEGATIVE  02/13/2017 03:52 PM    Bilirubin NEGATIVE  02/13/2017 03:52 PM    Urobilinogen 1.0 02/13/2017 03:52 PM    Nitrites NEGATIVE  02/13/2017 03:52 PM    Leukocyte Esterase NEGATIVE  02/13/2017 03:52 PM    Epithelial cells FEW 02/13/2017 03:52 PM    Bacteria NEGATIVE  02/13/2017 03:52 PM    WBC 0-4 02/13/2017 03:52 PM    RBC 0-5 02/13/2017 03:52 PM         Medications Reviewed:     Current Facility-Administered Medications   Medication Dose Route Frequency    0.9% sodium chloride infusion 250 mL  250 mL IntraVENous PRN    insulin lispro (HUMALOG) injection   SubCUTAneous AC&HS    glucose chewable tablet 16 g  4 Tab Oral PRN    dextrose (D50W) injection syrg 12.5-25 g  12.5-25 g IntraVENous PRN    glucagon (GLUCAGEN) injection 1 mg  1 mg IntraMUSCular PRN    0.9% sodium chloride infusion 250 mL  250 mL IntraVENous PRN    simvastatin (ZOCOR) tablet 10 mg  10 mg Oral QHS    sodium chloride (NS) flush 5-10 mL  5-10 mL IntraVENous Q8H    sodium chloride (NS) flush 5-10 mL  5-10 mL IntraVENous PRN    acetaminophen (TYLENOL) tablet 650 mg  650 mg Oral Q6H    acetaminophen (TYLENOL) tablet 650 mg  650 mg Oral Q6H PRN    oxyCODONE IR (ROXICODONE) tablet 5 mg  5 mg Oral Q3H PRN    oxyCODONE IR (ROXICODONE) tablet 10 mg  10 mg Oral Q3H PRN    naloxone (NARCAN) injection 0.4 mg  0.4 mg IntraVENous PRN    hydrOXYzine HCl (ATARAX) tablet 10 mg  10 mg Oral Q8H PRN    senna-docusate (PERICOLACE) 8.6-50 mg per tablet 1 Tab  1 Tab Oral BID    polyethylene glycol (MIRALAX) packet 17 g  17 g Oral DAILY    bisacodyl (DULCOLAX) suppository 10 mg  10 mg Rectal DAILY PRN    metoprolol tartrate (LOPRESSOR) tablet 25 mg  25 mg Oral QHS    ursodiol (ACTIGALL) capsule 300 mg  300 mg Oral BID    dilTIAZem CD (CARDIZEM CD) capsule 120 mg  120 mg Oral DAILY    ferrous sulfate tablet 325 mg  325 mg Oral QHS    lactulose (CHRONULAC) solution 10 g  10 g Oral TID    rifAXIMin (XIFAXAN) tablet 550 mg  550 mg Oral TID    allopurinol (ZYLOPRIM) tablet 100 mg  100 mg Oral DAILY    pantoprazole (PROTONIX) tablet 40 mg  40 mg Oral ACB    Warfarin- pharmacy to dose   Other Rx Dosing/Monitoring     ______________________________________________________________________  EXPECTED LENGTH OF STAY: 3d 14h  ACTUAL LENGTH OF STAY:          3                 Ulysses Schofield MD

## 2017-05-15 NOTE — PROGRESS NOTES
Problem: Discharge Planning  Goal: *Discharge to safe environment  Outcome: Progressing Towards Goal  Home health

## 2017-05-16 LAB
ANION GAP BLD CALC-SCNC: 6 MMOL/L (ref 5–15)
BASOPHILS # BLD AUTO: 0.1 K/UL (ref 0–0.1)
BASOPHILS # BLD: 1 % (ref 0–1)
BUN SERPL-MCNC: 42 MG/DL (ref 6–20)
BUN/CREAT SERPL: 17 (ref 12–20)
CALCIUM SERPL-MCNC: 8.1 MG/DL (ref 8.5–10.1)
CHLORIDE SERPL-SCNC: 107 MMOL/L (ref 97–108)
CO2 SERPL-SCNC: 23 MMOL/L (ref 21–32)
CREAT SERPL-MCNC: 2.49 MG/DL (ref 0.7–1.3)
EOSINOPHIL # BLD: 0.3 K/UL (ref 0–0.4)
EOSINOPHIL NFR BLD: 5 % (ref 0–7)
ERYTHROCYTE [DISTWIDTH] IN BLOOD BY AUTOMATED COUNT: 18.5 % (ref 11.5–14.5)
GLUCOSE BLD STRIP.AUTO-MCNC: 136 MG/DL (ref 65–100)
GLUCOSE BLD STRIP.AUTO-MCNC: 145 MG/DL (ref 65–100)
GLUCOSE BLD STRIP.AUTO-MCNC: 178 MG/DL (ref 65–100)
GLUCOSE BLD STRIP.AUTO-MCNC: 258 MG/DL (ref 65–100)
GLUCOSE SERPL-MCNC: 136 MG/DL (ref 65–100)
HCT VFR BLD AUTO: 26.9 % (ref 36.6–50.3)
HGB BLD-MCNC: 9 G/DL (ref 12.1–17)
INR PPP: 1.6 (ref 0.9–1.1)
LYMPHOCYTES # BLD AUTO: 20 % (ref 12–49)
LYMPHOCYTES # BLD: 0.9 K/UL (ref 0.8–3.5)
MAGNESIUM SERPL-MCNC: 1.8 MG/DL (ref 1.6–2.4)
MCH RBC QN AUTO: 27.6 PG (ref 26–34)
MCHC RBC AUTO-ENTMCNC: 33.5 G/DL (ref 30–36.5)
MCV RBC AUTO: 82.5 FL (ref 80–99)
MONOCYTES # BLD: 0.6 K/UL (ref 0–1)
MONOCYTES NFR BLD AUTO: 13 % (ref 5–13)
NEUTS SEG # BLD: 2.8 K/UL (ref 1.8–8)
NEUTS SEG NFR BLD AUTO: 61 % (ref 32–75)
PLATELET # BLD AUTO: 94 K/UL (ref 150–400)
POTASSIUM SERPL-SCNC: 4.3 MMOL/L (ref 3.5–5.1)
PROTHROMBIN TIME: 16.7 SEC (ref 9–11.1)
RBC # BLD AUTO: 3.26 M/UL (ref 4.1–5.7)
SERVICE CMNT-IMP: ABNORMAL
SODIUM SERPL-SCNC: 136 MMOL/L (ref 136–145)
WBC # BLD AUTO: 4.6 K/UL (ref 4.1–11.1)

## 2017-05-16 PROCEDURE — 97530 THERAPEUTIC ACTIVITIES: CPT

## 2017-05-16 PROCEDURE — 85025 COMPLETE CBC W/AUTO DIFF WBC: CPT | Performed by: INTERNAL MEDICINE

## 2017-05-16 PROCEDURE — 74011636637 HC RX REV CODE- 636/637: Performed by: HOSPITALIST

## 2017-05-16 PROCEDURE — 97116 GAIT TRAINING THERAPY: CPT

## 2017-05-16 PROCEDURE — 85610 PROTHROMBIN TIME: CPT | Performed by: INTERNAL MEDICINE

## 2017-05-16 PROCEDURE — 74011250637 HC RX REV CODE- 250/637: Performed by: NURSE PRACTITIONER

## 2017-05-16 PROCEDURE — 65270000029 HC RM PRIVATE

## 2017-05-16 PROCEDURE — 97110 THERAPEUTIC EXERCISES: CPT

## 2017-05-16 PROCEDURE — 74011250637 HC RX REV CODE- 250/637: Performed by: ORTHOPAEDIC SURGERY

## 2017-05-16 PROCEDURE — 82962 GLUCOSE BLOOD TEST: CPT

## 2017-05-16 PROCEDURE — 36415 COLL VENOUS BLD VENIPUNCTURE: CPT | Performed by: INTERNAL MEDICINE

## 2017-05-16 PROCEDURE — 74011250636 HC RX REV CODE- 250/636: Performed by: INTERNAL MEDICINE

## 2017-05-16 PROCEDURE — 83735 ASSAY OF MAGNESIUM: CPT | Performed by: INTERNAL MEDICINE

## 2017-05-16 PROCEDURE — 74011250637 HC RX REV CODE- 250/637: Performed by: PHYSICIAN ASSISTANT

## 2017-05-16 PROCEDURE — 80048 BASIC METABOLIC PNL TOTAL CA: CPT | Performed by: INTERNAL MEDICINE

## 2017-05-16 RX ORDER — FUROSEMIDE 10 MG/ML
80 INJECTION INTRAMUSCULAR; INTRAVENOUS ONCE
Status: COMPLETED | OUTPATIENT
Start: 2017-05-16 | End: 2017-05-16

## 2017-05-16 RX ADMIN — ACETAMINOPHEN 650 MG: 325 TABLET, FILM COATED ORAL at 17:03

## 2017-05-16 RX ADMIN — INSULIN LISPRO 2 UNITS: 100 INJECTION, SOLUTION INTRAVENOUS; SUBCUTANEOUS at 06:47

## 2017-05-16 RX ADMIN — INSULIN LISPRO 3 UNITS: 100 INJECTION, SOLUTION INTRAVENOUS; SUBCUTANEOUS at 21:52

## 2017-05-16 RX ADMIN — URSODIOL 300 MG: 300 CAPSULE ORAL at 10:14

## 2017-05-16 RX ADMIN — RIFAXIMIN 550 MG: 550 TABLET ORAL at 21:53

## 2017-05-16 RX ADMIN — Medication 10 ML: at 13:33

## 2017-05-16 RX ADMIN — Medication 325 MG: at 21:53

## 2017-05-16 RX ADMIN — ACETAMINOPHEN 650 MG: 325 TABLET, FILM COATED ORAL at 12:43

## 2017-05-16 RX ADMIN — LACTULOSE 10 G: 10 SOLUTION ORAL at 10:13

## 2017-05-16 RX ADMIN — METOPROLOL TARTRATE 25 MG: 25 TABLET ORAL at 21:53

## 2017-05-16 RX ADMIN — INSULIN LISPRO 2 UNITS: 100 INJECTION, SOLUTION INTRAVENOUS; SUBCUTANEOUS at 17:03

## 2017-05-16 RX ADMIN — ACETAMINOPHEN 650 MG: 325 TABLET, FILM COATED ORAL at 23:34

## 2017-05-16 RX ADMIN — ALLOPURINOL 100 MG: 100 TABLET ORAL at 10:14

## 2017-05-16 RX ADMIN — FUROSEMIDE 80 MG: 10 INJECTION, SOLUTION INTRAMUSCULAR; INTRAVENOUS at 13:29

## 2017-05-16 RX ADMIN — RIFAXIMIN 550 MG: 550 TABLET ORAL at 10:13

## 2017-05-16 RX ADMIN — Medication 10 ML: at 21:52

## 2017-05-16 RX ADMIN — SIMVASTATIN 10 MG: 10 TABLET, FILM COATED ORAL at 21:53

## 2017-05-16 RX ADMIN — ACETAMINOPHEN 650 MG: 325 TABLET, FILM COATED ORAL at 06:43

## 2017-05-16 RX ADMIN — PANTOPRAZOLE SODIUM 40 MG: 40 TABLET, DELAYED RELEASE ORAL at 06:43

## 2017-05-16 RX ADMIN — URSODIOL 300 MG: 300 CAPSULE ORAL at 17:02

## 2017-05-16 RX ADMIN — WARFARIN SODIUM 7.5 MG: 2.5 TABLET ORAL at 12:43

## 2017-05-16 RX ADMIN — RIFAXIMIN 550 MG: 550 TABLET ORAL at 17:03

## 2017-05-16 RX ADMIN — DILTIAZEM HYDROCHLORIDE 120 MG: 120 CAPSULE, EXTENDED RELEASE ORAL at 10:14

## 2017-05-16 NOTE — PROGRESS NOTES
Pharmacist Note  Warfarin Dosing  Consult provided for this 60 y.o.male to manage warfarin for Afib, s/p orthopedic surgery     INR Goal: 2 - 3  Home regimen/ tablet size: 5 mg PO daily    Drugs that may increase INR: None  Drugs that may decrease INR: None  Other current anticoagulants/ drugs that may increase bleeding risk: None  Risk factors: None  Daily INR ordered: YES    Recent Labs      05/16/17   0635  05/15/17   0633  05/14/17   0216   HGB  9.0*  7.7*  7.5*   INR  1.6*  1.7*  1.7*     Date               INR                  Dose  5/12                1.3                   5 mg  5/13                1.4                   5 mg  5/14                1.7                   5 mg  5/15  1.7  7.5 mg  5/16                1.6                   7.5 mg                                                                               Assessment/ Plan: Will order warfarin 7.5 mg PO x 1 dose. INR likely decreased today due to PRBC infusion. Will give another 7.5 mg boost and assess INR tomorrow. Pharmacy will continue to monitor daily and adjust therapy as indicated. Please contact the pharmacist at  or  for outpatient recommendations if needed.

## 2017-05-16 NOTE — PROGRESS NOTES
Nephrology Progress Note  Leo Allison III  Date of Admission : 5/12/2017    CC: Follow up for HERBIE       Assessment and Plan     HERBIE on CKD:  - 2/2 toradol + ATN from hypotension  - Cr improving  - will give a dose of IV lasix and monitor UOP, BUN/Cr  - daily labs for now    CKD III:  - baseline Cr around 1.8  - likely from chronic HTN and DM2    Anemia:  - hgb stable  - s/p 2 units of blood on 5/15    Cirrhosis, CAMPOVERDE    DM2:  - per primary service    HTN:  - BP stable    Gout:  - cont allopurinol    Staph infection of right TKR, S/P removal 5/12/17       Interval History:  Seen and examined. Feeling ok. Continued improvement in renal function. UOP stable. Pt has significant LE edema and scrotal edema. No cp, sob, n/v/d reported. Current Medications: all current  Medications have been eviewed in EPIC  Review of Systems: Pertinent items are noted in HPI. Objective:  Vitals:    Vitals:    05/15/17 2136 05/16/17 0230 05/16/17 0821 05/16/17 0947   BP: 127/53 108/61 101/52 112/57   Pulse: 78 66 67 72   Resp:  16 16    Temp:  98.5 °F (36.9 °C) 98.2 °F (36.8 °C)    SpO2:  96% 97%    Weight:       Height:         Intake and Output:     05/14 1901 - 05/16 0700  In: 1300 [P.O.:400]  Out: 3115 [Urine:2850; Drains:265]    Physical Examination:  General: NAD,Conversant   Neck:  Supple, no mass  Resp:  Lungs CTA B/L, no wheezing , normal respiratory effort  CV:  RRR,  no murmur or rub, 3+ b/l LE edema  GI:  Soft, NT, + Bowel sounds, no hepatosplenomegaly  Neurologic:  Non focal  Psych:             AAO x 3 appropriate affect   Skin:  No Rash  :  Echavarria in place, + scrotal edema    []    High complexity decision making was performed  []    Patient is at high-risk of decompensation with multiple organ involvement    Lab Data Personally Reviewed: I have reviewed all the pertinent labs, microbiology data and radiology studies during assessment.     Recent Labs      05/16/17   9145  05/15/17   3360  05/14/17   0216 NA  136  136  135*   K  4.3  4.1  4.0   CL  107  106  107   CO2  23  25  19*   GLU  136*  127*  142*   BUN  42*  42*  42*   CREA  2.49*  3.05*  3.46*   CA  8.1*  7.3*  7.8*   MG  1.8  1.6  1.6   INR  1.6*  1.7*  1.7*     Recent Labs      05/16/17   0635  05/15/17   0633  05/14/17   0216   WBC  4.6  3.7*  3.4*   HGB  9.0*  7.7*  7.5*   HCT  26.9*  23.4*  23.1*   PLT  94*  82*  66*     Lab Results   Component Value Date/Time    Specimen Description: URINE 03/15/2012 11:55 AM     Lab Results   Component Value Date/Time    Culture result: MRSA NOT PRESENT 02/13/2017 03:15 PM    Culture result:  02/13/2017 03:15 PM         Screening of patient nares for MRSA is for surveillance purposes and, if positive, to facilitate isolation considerations in high risk settings. It is not intended for automatic decolonization interventions per se as regimens are not sufficiently effective to warrant routine use.     Culture result: NO GROWTH ON SOLID MEDIA AT 14 DAYS 11/10/2016 04:05 PM    Culture result:  11/10/2016 04:05 PM     STAPHYLOCOCCUS EPIDERMIDIS  ISOLATED FROM THIO BROTH ONLY      Culture result:  11/10/2016 04:05 PM     PRELIMINARY RESULT OF GRAM POSITIVE COCCI IN CLUSTERS  SEEN ON GRAM STAIN OF THE THIO BROTH  CALLED TO AND READ BACK BY  Edison Blackwood AT 1054, 11/13/16 DB      Culture result: (THERE IS NO AEROBIC CULTURE ORDER) 11/10/2016 04:05 PM     Recent Results (from the past 24 hour(s))   GLUCOSE, POC    Collection Time: 05/15/17  4:21 PM   Result Value Ref Range    Glucose (POC) 188 (H) 65 - 100 mg/dL    Performed by Yue Shen, POC    Collection Time: 05/15/17  9:24 PM   Result Value Ref Range    Glucose (POC) 162 (H) 65 - 100 mg/dL    Performed by CASSIA BURROWS    GLUCOSE, POC    Collection Time: 05/16/17  6:10 AM   Result Value Ref Range    Glucose (POC) 145 (H) 65 - 100 mg/dL    Performed by [address] [place] [address], BASIC    Collection Time: 05/16/17  6:35 AM   Result Value Ref Range Sodium 136 136 - 145 mmol/L    Potassium 4.3 3.5 - 5.1 mmol/L    Chloride 107 97 - 108 mmol/L    CO2 23 21 - 32 mmol/L    Anion gap 6 5 - 15 mmol/L    Glucose 136 (H) 65 - 100 mg/dL    BUN 42 (H) 6 - 20 MG/DL    Creatinine 2.49 (H) 0.70 - 1.30 MG/DL    BUN/Creatinine ratio 17 12 - 20      GFR est AA 32 (L) >60 ml/min/1.73m2    GFR est non-AA 26 (L) >60 ml/min/1.73m2    Calcium 8.1 (L) 8.5 - 10.1 MG/DL   MAGNESIUM    Collection Time: 05/16/17  6:35 AM   Result Value Ref Range    Magnesium 1.8 1.6 - 2.4 mg/dL   CBC WITH AUTOMATED DIFF    Collection Time: 05/16/17  6:35 AM   Result Value Ref Range    WBC 4.6 4.1 - 11.1 K/uL    RBC 3.26 (L) 4.10 - 5.70 M/uL    HGB 9.0 (L) 12.1 - 17.0 g/dL    HCT 26.9 (L) 36.6 - 50.3 %    MCV 82.5 80.0 - 99.0 FL    MCH 27.6 26.0 - 34.0 PG    MCHC 33.5 30.0 - 36.5 g/dL    RDW 18.5 (H) 11.5 - 14.5 %    PLATELET 94 (L) 056 - 400 K/uL    NEUTROPHILS 61 32 - 75 %    LYMPHOCYTES 20 12 - 49 %    MONOCYTES 13 5 - 13 %    EOSINOPHILS 5 0 - 7 %    BASOPHILS 1 0 - 1 %    ABS. NEUTROPHILS 2.8 1.8 - 8.0 K/UL    ABS. LYMPHOCYTES 0.9 0.8 - 3.5 K/UL    ABS. MONOCYTES 0.6 0.0 - 1.0 K/UL    ABS. EOSINOPHILS 0.3 0.0 - 0.4 K/UL    ABS. BASOPHILS 0.1 0.0 - 0.1 K/UL   PROTHROMBIN TIME + INR    Collection Time: 05/16/17  6:35 AM   Result Value Ref Range    INR 1.6 (H) 0.9 - 1.1      Prothrombin time 16.7 (H) 9.0 - 11.1 sec   GLUCOSE, POC    Collection Time: 05/16/17 11:46 AM   Result Value Ref Range    Glucose (POC) 136 (H) 65 - 100 mg/dL    Performed by Chiki Shannon MD  [facility]   [address] [address]  Phone - [phone]   Fax - [phone]  [email or web address]

## 2017-05-16 NOTE — PROGRESS NOTES
Bedside shift change report given to Jacob Hagan (oncoming nurse) by Alison (offgoing nurse). Report included the following information SBAR and Kardex.

## 2017-05-16 NOTE — PROGRESS NOTES
Problem: Mobility Impaired (Adult and Pediatric)  Goal: *Acute Goals and Plan of Care (Insert Text)  Physical Therapy Goals  Initiated 5/13/2017    1. Patient will move from supine to sit and sit to supine and scoot up and down in bed with modified independence within 4 days. 2. Patient will perform sit to stand with minimal assistance/contact guard assist within 4 days. 3. Patient will ambulate with modified independence for 300 feet with the least restrictive device within 4 days. 4. Patient will ascend/descend 5 stairs with 1 handrail(s) with modified independence within 4 days. 5. Patient will perform home exercise program per protocol with independence within 4 days. 6. Patient will demonstrate AROM 0-90 degrees in operative joint within 4 days. PHYSICAL THERAPY TREATMENT  Patient: [facility] (42 y.o. male)  Date: 5/16/2017  Diagnosis: STATUS POST RIGHT TOTAL KNEE REPLACEMENT  Failed total right knee replacement, sequela Failed total right knee replacement (HCC)  Procedure(s) (LRB):  REMOVAL OF PROSTALAC SPACER, RIGHT TOTAL KNEE REVISION (Right) 4 Days Post-Op  Precautions: Fall, WBAT  Chart, physical therapy assessment, plan of care and goals were reviewed. ASSESSMENT:  Pt received supine in bed w/ HOB elevated, watching t.v.; knee immobilizer donned. Pt able to transfer supine to sit w/ SB-CGA for RLE OOB w/ additional time (pt able to slide RLE to EOB). Required Min-CGA for sit<>stand transfers from elevated bed and into elevated chair (\"hip\" chair). Pt continues to have wide JOEY and continues to report ability to bear weight through RLE w/ standing and gait. Pt improved gait distance this AM, amb approx 120' (60 FTx 2) w/ RW and CG-SBA (no LOB or knee buckling noted, pt endurance slowly improving but gait still slowed). Pt performed 4 steps w/ CGA x2 and additional time using right rail.  Pt verbally cued to face rail w/ descend as pt coming down stair backward (instructed pt to use side stepping technique for stairs d/t inability to flex right knee w/ immobilizer donned). Pt returned to chair at bedside w/ needs met , items in reach, and RLE elevated on stool and pillow. VSS throughout session. Pt reported \"d/c for either today or tomorrow and PA/Physician planning to see pt sometime today to discuss use of knee immobilizer\". At this point, pt may benefit from additional stair training to ensure safety and stability. Will continue w/ afternoon PT. Progression toward goals:  [X]      Improving appropriately and progressing toward goals  [ ]      Improving slowly and progressing toward goals  [ ]      Not making progress toward goals and plan of care will be adjusted       PLAN:  Patient continues to benefit from skilled intervention to address the above impairments. Continue treatment per established plan of care. Discharge Recommendations:  Home Health  Further Equipment Recommendations for Discharge:  None, pt owns required DME       SUBJECTIVE:   Patient stated Not today, she's at home.  when asked if pt experiencing any pain/discomfort. OBJECTIVE DATA SUMMARY:   Critical Behavior:  Neurologic State: Alert  Orientation Level: Oriented X4        Range of Motion:      unable d/t ace wrap and knee immobilizer. Functional Mobility Training:  Bed Mobility:     Supine to Sit: Stand-by asssistance;Contact guard assistance (RLE OOB)                          Transfers:  Sit to Stand: Minimum assistance; Adaptive equipment; Additional time (from elevated bed)  Stand to Sit: Stand-by asssistance                             Balance:  Sitting: Intact  Standing: Intact; With support  Ambulation/Gait Training:  Distance (ft): 60 Feet (ft) (x2)  Assistive Device: Gait belt;Walker, rolling  Ambulation - Level of Assistance: Stand-by asssistance;Contact guard assistance        Gait Abnormalities: Antalgic;Decreased step clearance; Step to gait        Base of Support: Widened Speed/Renee: Pace decreased (<100 feet/min); Slow  Step Length: Left shortened;Right shortened        Interventions:  (verbal cues to encourage side stepping)                      Stairs:  Number of Stairs Trained: 4  Stairs - Level of Assistance: Contact guard assistance; Additional time;Assist X2  Rail Use: Right   Therapeutic Exercises:     EXERCISE   Sets   Reps   Active Active Assist   Passive Self ROM   Comments   Ankle Pumps     [ ]                                [ ]                                [ ]                                [ ]                                    Quad Sets (LLE)   10 [X]                                [ ]                                [ ]                                [ ]                                Glut sets x10  Also performed    Hamstring Sets (BLE)   10 [X]                                [ ]                                [ ]                                [ ]                                    Elli Mitchell     [ ]                                [ ]                                [ ]                                [ ]                                    Gregory Chacko       [ ]                                  [ ]                                  [ ]                                  [ ]                                    Moreno Olivares     [ ]                                [ ]                                [ ]                                [ ]                                    Jerald Brewster     [ ]                                [ ]                                [ ]                                [ ]                                    Opal Villaseñor     [ ]                                [ ]                                [ ]                                [ ]                                    Aggie Loving     [ ]                                [ ]                                [ ]                                [ ] Pain:  Pain Scale 1: Numeric (0 - 10)  Pain Intensity 1: 0              Activity Tolerance:   Fair, pt continues to report fatigue during gait training. VSS. Please refer to the flowsheet for vital signs taken during this treatment.   After treatment:   [X] Patient left in no apparent distress sitting up in chair  [ ] Patient left in no apparent distress in bed  [X] Call bell left within reach  [X] Nursing notified  [ ] Caregiver present  [ ] Bed alarm activated      COMMUNICATION/COLLABORATION:   The patients plan of care was discussed with: Registered Nurse Tamiko LORENZANA Means,PTA   Time Calculation: 37 mins

## 2017-05-16 NOTE — PROGRESS NOTES
..Bedside and Verbal shift change report given to Jose Goetz (oncoming nurse) by Efren Prieto (offgoing nurse). Report included the following information SBAR.

## 2017-05-16 NOTE — PROGRESS NOTES
CRM met with the patient to offer home care agency choice. The patient chose [facility]. He plans to [facility]. [place] [id number] [phone]. CRM sent referral via [employer] to [email or web address] Will follow.  SIGIFREDO

## 2017-05-16 NOTE — PROGRESS NOTES
Assessemnt/Plan:   Daily Progress Note: 2017    Admit date: 2017  6:42 AM    Hospitalist Progress Note   Partha Frost [phone]; Call physician on-call through the  7pm-7am          PCP: Wally Ugarte MD   In Hospital Procedure:   Procedure(s):  REMOVAL OF PROSTALAC SPACER, RIGHT TOTAL [address]  Consultants this Hospitalization:   IP CONSULT TO HOSPITALIST  IP CONSULT TO [address] [place] [address] Procedure:   Procedure(s):  REMOVAL OF PROSTALAC SPACER, RIGHT TOTAL KNEE REVISION                 NAME:  Romana Pippins      :  1963  MRN:  [id number]    Admission Summary:   Romana Pippins is a 60 y.o. male who presents with declining renal function. Onset of symptoms was gradual with unchanged course since that time. Patient is currently admitted to the Orthopedic service and is postop post revision right total knee. We are asked to see the patient in consult to assist in managing HERBIE.          Interval history / Subjective:   No acute complaint, no abdominal pain       Assessment & Plan:      HERBIE on CKD  -creatinine improving, IVF discontinued on 5/15  -on blood transfusion  -diuretics, toradol and NSAID stopped  -nephrologist on board  - Cr moving in correct direction.        Hx of DM-II  -finger stick glucose 117-194/24 hrs  -continue sliding scale  -he said he doesn't take insulin at home  -A1c 5.7  -continue diet control     HTN  -on diltiazem and metoprolol, monitor BP  - good control      Leukopenia   -possible due to meds, improving  -monitor cbc     CAMPOVERDE Cirrhosis  -on lactulose and rfaximin  -stable liver function  - no confusion     Anemia multifactorial  -on ferrous sulfate  -no evidence of active bleeding  -H/H trending down, 2 units PRBC on 5/15, monitor H/H  Hgb up to 9 range          Chronic thrombocytopenia likely due to liver cirrhosis  -improving, monitor platelet  -patient on coumadin, watch for bleeding  - stable at 80 to 90 range    Hx of atrial tachycardia  -rate normal, on metoprolol and diltiazem     Hx of gout  -continue on allupurinol, and ursodil     S/p removal of hardware and revision of right total knee replacement  -on vancomycin  -management per orthopedic surgeon        Code status: Full Code  DVT prophylaxis: coumadin      Care Plan discussed with: Patient/Family, Nurse and         Subjective:     ROS      Review of Systems:    Constitutional       weakness  Eyes        Ears,Nose, Mouth, Throat     Cardiovascular    No cp  Respiratory     No sob  Gastrointestinal    No n/v/d  Genitourinary     Scrotal edema, marked   Musculoskeletal    No having pain   Integumentary (skin and/or breast)     Neurological     Diminished feeling in legs, chronic. Psychiatric        Endocrine        Hematologic/Lymphatic      Allergic/Immunologic             Could NOT obtain due to:       Objective:     VITALS:   Last 24hrs VS reviewed since prior progress note.  Most recent are:  Patient Vitals for the past 24 hrs:   Temp Pulse Resp BP SpO2   05/16/17 1328 - 69 - 121/68 -   05/16/17 0947 - 72 - 112/57 -   05/16/17 0821 98.2 °F (36.8 °C) 67 16 101/52 97 %   05/16/17 0230 98.5 °F (36.9 °C) 66 16 108/61 96 %   05/15/17 2136 - 78 - 127/53 -   05/15/17 2044 98.4 °F (36.9 °C) 77 16 109/54 97 %   05/15/17 1830 97.5 °F (36.4 °C) 68 16 122/60 -   05/15/17 1730 97.3 °F (36.3 °C) 70 16 124/60 -   05/15/17 1630 97.7 °F (36.5 °C) 68 16 122/55 -   05/15/17 1530 97.8 °F (36.6 °C) 67 16 122/55 -   05/15/17 1500 97.8 °F (36.6 °C) 66 16 122/60 -       Intake/Output Summary (Last 24 hours) at 05/16/17 1450  Last data filed at 05/16/17 0641   Gross per 24 hour   Intake              900 ml   Output             2010 ml   Net            -1110 ml        PHYSICAL EXAM:  General appearance:   acute distress  ,     Alert;           conversant      Eyes: anicteric sclerae, moist conjunctivae;     no lid-lag;    + PERRLA;    HENT: Atraumatic;   oropharynx clear with:  moist  mucous membranes      Neck: Trachea midline; FROM, supple, no thyromegaly or lymphadenopathy;    Lungs: CTA, with normal respiratory effort and no intercostal retractions    CV: RRR, no MRGs; Abdomen: Soft, non-tender with finding c/c ascites; no masses or HSM;    Extremities: +++ edema peripheral        Skin: Normal temperature  ; turgor is fair ; the texture  nl ; no rash,     Neuro: CN 2- 12 intact, no focal motor weakness , s     Psych: Appropriate  affect, alert   and oriented to person, place and time ;            Reviewed most current radiology test results   y  Review and summation of old records today   y  Reviewed patient's current orders and MAR   y  PMH/SH reviewed - no change compared to H&P  ______________________________________________________________________  Medicines:    Current Facility-Administered Medications:     0.9% sodium chloride infusion 250 mL, 250 mL, IntraVENous, PRN, Kay Gonzáles MD    insulin lispro (HUMALOG) injection, , SubCUTAneous, AC&HS, Ruthann Cardona MD, Stopped at 05/16/17 1130    glucose chewable tablet 16 g, 4 Tab, Oral, PRN, Ruthann Cardona MD    dextrose (D50W) injection syrg 12.5-25 g, 12.5-25 g, IntraVENous, PRN, Ruthann Cardona MD    glucagon (GLUCAGEN) injection 1 mg, 1 mg, IntraMUSCular, PRN, Ruthann Cardona MD    0.9% sodium chloride infusion 250 mL, 250 mL, IntraVENous, PRN, Tavia Can MD    simvastatin (ZOCOR) tablet 10 mg, 10 mg, Oral, QHS, Liane Thomas, NP, 10 mg at 05/15/17 2135    sodium chloride (NS) flush 5-10 mL, 5-10 mL, IntraVENous, Q8H, DAIRON Watkins-C, 10 mL at 05/16/17 1333    sodium chloride (NS) flush 5-10 mL, 5-10 mL, IntraVENous, PRN, DARION Watkins-C    acetaminophen (TYLENOL) tablet 650 mg, 650 mg, Oral, Q6H, Hilda Ponce PA-C, 650 mg at 05/16/17 1243    acetaminophen (TYLENOL) tablet 650 mg, 650 mg, Oral, Q6H PRN, Hilda Ponce PA-C    oxyCODONE IR (ROXICODONE) tablet 5 mg, 5 mg, Oral, Q3H PRN, Renetta Loron, PA-C    oxyCODONE IR (ROXICODONE) tablet 10 mg, 10 mg, Oral, Q3H PRN, Renetta Loron, PA-C    naloxone [facility]) injection 0.4 mg, 0.4 mg, IntraVENous, PRN, Renetta Loron, PA-C    hydrOXYzine HCl (ATARAX) tablet 10 mg, 10 mg, Oral, Q8H PRN, Renetta Loron, PA-C    senna-docusate (PERICOLACE) 8.6-50 mg per tablet 1 Tab, 1 Tab, Oral, BID, Renetta Loron, PA-C, 1 Tab at 05/15/17 1724    polyethylene glycol (MIRALAX) packet 17 g, 17 g, Oral, DAILY, Renetta Loron, PA-C, 17 g at 05/13/17 1048    bisacodyl (DULCOLAX) suppository 10 mg, 10 mg, Rectal, DAILY PRN, Renetta Loron, PA-C    metoprolol tartrate (LOPRESSOR) tablet 25 mg, 25 mg, Oral, QHS, Renetta Loron, PA-C, 25 mg at 05/15/17 2135    ursodiol (ACTIGALL) capsule 300 mg, 300 mg, Oral, BID, Renetta Loron, PA-C, 300 mg at 05/16/17 1014    dilTIAZem CD (CARDIZEM CD) capsule 120 mg, 120 mg, Oral, DAILY, Renetta Loron, PA-C, 120 mg at 05/16/17 1014    ferrous sulfate tablet 325 mg, 325 mg, Oral, QHS, Renetta Loron, PA-C, 325 mg at 05/15/17 2135    lactulose (CHRONULAC) solution 10 g, 10 g, Oral, TID, Renetta Loron, PA-C, 10 g at 05/16/17 1013    rifAXIMin (XIFAXAN) tablet 550 mg, 550 mg, Oral, TID, Renetta Loron, PA-C, 550 mg at 05/16/17 1013    allopurinol (ZYLOPRIM) tablet 100 mg, 100 mg, Oral, DAILY, Renetta Loron, PA-C, 100 mg at 05/16/17 1014    pantoprazole (PROTONIX) tablet 40 mg, 40 mg, Oral, ACB, Renetta Garibay PA-C, 40 mg at 05/16/17 4818    Warfarin- pharmacy to dose, , Other, Rx Dosing/Monitoring, Shyrl Prader, MD  Procedures: see electronic medical records for all procedures/Xrays and details which were not copied into this note but were reviewed prior to creation of Plan.       LABS:  Recent Labs      05/16/17   0635  05/15/17   0633  05/14/17   0216   WBC  4.6  3.7*  3.4*   HGB  9.0*  7.7*  7.5*   HCT  26.9*  23.4*  23.1*   PLT  94* 82*  66*     Recent Labs      05/16/17   0635  05/15/17   0633  05/14/17   0216   NA  136  136  135*   K  4.3  4.1  4.0   CL  107  106  107   CO2  23  25  19*   BUN  42*  42*  42*   CREA  2.49*  3.05*  3.46*   GLU  136*  127*  142*   CA  8.1*  7.3*  7.8*   MG  1.8  1.6  1.6     No results for input(s): SGOT, GPT, ALT, AP, TBIL, TBILI, TP, ALB, GLOB, GGT, AML, LPSE in the last 72 hours. No lab exists for component: AMYP, HLPSE  Recent Labs      05/16/17   0635  05/15/17   0633  05/14/17   0216   INR  1.6*  1.7*  1.7*   PTP  16.7*  17.6*  17.4*      No results for input(s): FE, TIBC, PSAT, FERR in the last 72 hours. Lab Results   Component Value Date/Time    Folate 6.6 11/03/2016 02:46 AM      No results for input(s): PH, PCO2, PO2 in the last 72 hours. No results for input(s): PHI, PO2I, PCO2I in the last 72 hours. No results for input(s): CPK, CKNDX, TROIQ in the last 72 hours.     No lab exists for component: CPKMB  No results found for: CHOL, CHOLX, CHLST, CHOLV, HDL, LDL, DLDL, LDLC, DLDLP, TGL, TGLX, TRIGL, TRIGP, CHHD, CHHDX  Lab Results   Component Value Date/Time    Glucose (POC) 136 05/16/2017 11:46 AM    Glucose (POC) 145 05/16/2017 06:10 AM    Glucose (POC) 162 05/15/2017 09:24 PM    Glucose (POC) 188 05/15/2017 04:21 PM    Glucose (POC) 147 05/15/2017 12:00 PM     Lab Results   Component Value Date/Time    Color YELLOW/STRAW 02/13/2017 03:52 PM    Appearance CLEAR 02/13/2017 03:52 PM    Specific gravity 1.019 02/13/2017 03:52 PM    pH (UA) 5.0 02/13/2017 03:52 PM    Protein NEGATIVE  02/13/2017 03:52 PM    Glucose NEGATIVE  02/13/2017 03:52 PM    Ketone NEGATIVE  02/13/2017 03:52 PM    Bilirubin NEGATIVE  02/13/2017 03:52 PM    Urobilinogen 1.0 02/13/2017 03:52 PM    Nitrites NEGATIVE  02/13/2017 03:52 PM    Leukocyte Esterase NEGATIVE  02/13/2017 03:52 PM    Epithelial cells FEW 02/13/2017 03:52 PM    Bacteria NEGATIVE  02/13/2017 03:52 PM    WBC 0-4 02/13/2017 03:52 PM    RBC 0-5 02/13/2017 03:52 PM           CULTURES:    Lab Results   Component Value Date/Time    Specimen Description: URINE 03/15/2012 11:55 AM    Lab Results   Component Value Date/Time    Culture result: MRSA NOT PRESENT 02/13/2017 03:15 PM    Culture result:  02/13/2017 03:15 PM         Screening of patient nares for MRSA is for surveillance purposes and, if positive, to facilitate isolation considerations in high risk settings. It is not intended for automatic decolonization interventions per se as regimens are not sufficiently effective to warrant routine use. Culture result: NO GROWTH ON SOLID MEDIA AT 14 DAYS 11/10/2016 04:05 PM    Culture result:  11/10/2016 04:05 PM     STAPHYLOCOCCUS EPIDERMIDIS  ISOLATED FROM THIO BROTH ONLY      Culture result:  11/10/2016 04:05 PM     PRELIMINARY RESULT OF GRAM POSITIVE COCCI IN CLUSTERS  SEEN ON GRAM STAIN OF THE THIO BROTH  CALLED TO AND READ BACK BY  Martita Nieto AT 1054, 11/13/16 DB      Culture result: (THERE IS NO AEROBIC CULTURE ORDER) 11/10/2016 04:05 PM        CT Results (most recent):    Results from [place] encounter on 10/31/16   CT LOW EXT RT WO CONT   Narrative INDICATION:  post-op anemia, eval for hematoma     EXAM: CT right lower extremity. No comparisons. Thin section axial images were obtained. From these sagittal and coronal  reformats were performed. CT dose reduction was achieved through use of a  standardized protocol tailored for this examination and automatic exposure  control for dose modulation. FINDINGS: Patient is post cemented total knee arthroplasty on the right with a  large radiolucent spacer. There is a moderate complex joint effusion. There is  no soft tissue hematoma demonstrated. Mild edema is noted in the subcutaneous tissues slightly asymmetric to the left. There are vascular calcifications. There are severe atrophy of the right  peroneus brevis and longus muscle bodies.  Gas within the soft tissues is not  unexpected post recent surgery. There is diverticulosis of the sigmoid colon. No dilated loops of bowel are  noted within the pelvis         Impression IMPRESSION:  1.  No soft tissue hematoma post right total knee arthroplasty          Andrés Meyer MD

## 2017-05-16 NOTE — PROGRESS NOTES
[facility] cannot take the case. They cannot see for PT until next Monday. CRM sent referral to [facility] via [address] [facility] office) Will follow. SIGIFREDO CHRISTIANSON followed up with Garrick Mcgovern at the [address] [place] [address]. She [place] check [place] to see if they can take the case. The patient may not be seen for home care until next Monday. If the patient were to stay in [place] then there would be no problem. CRM will follow. Pam Duran for [facility] called back and they will be able to see this patient for nursing on Sat. (the plan is discharge on Friday due to some medical issues) PT may be able to see over the weekend, if not they will see this patient on Monday for PT.  SIGIFREDO

## 2017-05-16 NOTE — PROGRESS NOTES
Problem: Mobility Impaired (Adult and Pediatric)  Goal: *Acute Goals and Plan of Care (Insert Text)  Physical Therapy Goals  Initiated 5/13/2017    1. Patient will move from supine to sit and sit to supine and scoot up and down in bed with modified independence within 4 days. 2. Patient will perform sit to stand with minimal assistance/contact guard assist within 4 days. 3. Patient will ambulate with modified independence for 300 feet with the least restrictive device within 4 days. 4. Patient will ascend/descend 5 stairs with 1 handrail(s) with modified independence within 4 days. 5. Patient will perform home exercise program per protocol with independence within 4 days. 6. Patient will demonstrate AROM 0-90 degrees in operative joint within 4 days. Followed up w/ pt for second session of PT. Pt received walking out of bathroom w/ NSG (CGA, RW). Pt reported not being able to gait train this afternoon due to increased scrotal edema and discomfort. Pt agreeable to bed exercises once returned to bed. Pt assisted to supine in bed w/ Min A and additional time, bed elevated to assist w/ sit>stand transfer. Pt required verbal cues w/Min A to scoot hips back further into bed. Pt performed following exercises x10 each (LLE): Heel slides, ankle pumps (B), quad sets, glut set, hamstring set (B), and SLR (B, CGA). PT/OT elevated scrotum using folded towels (2) and sheet. Informed pt to remain in bed w/ scrotum elevated on towels and sheet to aid w/ decrease in swelling. Will continue w/ PT tomorrow morning.       Loulou Oropeza, PTA

## 2017-05-16 NOTE — PROGRESS NOTES
Bedside and Verbal shift change report given to Niki SANTAMARIA (oncoming nurse) by Niya Jones (offgoing nurse). Report included the following information SBAR, Kardex, Intake/Output and MAR.

## 2017-05-17 LAB
ABO + RH BLD: NORMAL
ALBUMIN SERPL BCP-MCNC: 2.2 G/DL (ref 3.5–5)
ANION GAP BLD CALC-SCNC: 7 MMOL/L (ref 5–15)
BLD PROD TYP BPU: NORMAL
BLOOD BANK CMNT PATIENT-IMP: NORMAL
BLOOD GROUP ANTIBODIES SERPL: NORMAL
BLOOD GROUP ANTIBODIES SERPL: NORMAL
BPU ID: NORMAL
BUN SERPL-MCNC: 41 MG/DL (ref 6–20)
BUN/CREAT SERPL: 18 (ref 12–20)
CALCIUM SERPL-MCNC: 8.6 MG/DL (ref 8.5–10.1)
CHLORIDE SERPL-SCNC: 107 MMOL/L (ref 97–108)
CO2 SERPL-SCNC: 25 MMOL/L (ref 21–32)
CREAT SERPL-MCNC: 2.23 MG/DL (ref 0.7–1.3)
CROSSMATCH RESULT,%XM: NORMAL
GLUCOSE BLD STRIP.AUTO-MCNC: 107 MG/DL (ref 65–100)
GLUCOSE BLD STRIP.AUTO-MCNC: 136 MG/DL (ref 65–100)
GLUCOSE BLD STRIP.AUTO-MCNC: 153 MG/DL (ref 65–100)
GLUCOSE BLD STRIP.AUTO-MCNC: 186 MG/DL (ref 65–100)
GLUCOSE SERPL-MCNC: 111 MG/DL (ref 65–100)
INR PPP: 1.7 (ref 0.9–1.1)
MAGNESIUM SERPL-MCNC: 1.7 MG/DL (ref 1.6–2.4)
PHOSPHATE SERPL-MCNC: 3.5 MG/DL (ref 2.6–4.7)
POTASSIUM SERPL-SCNC: 4 MMOL/L (ref 3.5–5.1)
PROTHROMBIN TIME: 17.4 SEC (ref 9–11.1)
SERVICE CMNT-IMP: ABNORMAL
SODIUM SERPL-SCNC: 139 MMOL/L (ref 136–145)
SPECIMEN EXP DATE BLD: NORMAL
STATUS OF UNIT,%ST: NORMAL
UNIT DIVISION, %UDIV: 0

## 2017-05-17 PROCEDURE — 85610 PROTHROMBIN TIME: CPT | Performed by: INTERNAL MEDICINE

## 2017-05-17 PROCEDURE — 36415 COLL VENOUS BLD VENIPUNCTURE: CPT | Performed by: INTERNAL MEDICINE

## 2017-05-17 PROCEDURE — 74011250637 HC RX REV CODE- 250/637: Performed by: PHYSICIAN ASSISTANT

## 2017-05-17 PROCEDURE — 74011636637 HC RX REV CODE- 636/637: Performed by: HOSPITALIST

## 2017-05-17 PROCEDURE — 74011250637 HC RX REV CODE- 250/637: Performed by: ORTHOPAEDIC SURGERY

## 2017-05-17 PROCEDURE — 74011250636 HC RX REV CODE- 250/636: Performed by: INTERNAL MEDICINE

## 2017-05-17 PROCEDURE — 83735 ASSAY OF MAGNESIUM: CPT | Performed by: INTERNAL MEDICINE

## 2017-05-17 PROCEDURE — 97110 THERAPEUTIC EXERCISES: CPT

## 2017-05-17 PROCEDURE — 97535 SELF CARE MNGMENT TRAINING: CPT

## 2017-05-17 PROCEDURE — 80069 RENAL FUNCTION PANEL: CPT | Performed by: INTERNAL MEDICINE

## 2017-05-17 PROCEDURE — G8987 SELF CARE CURRENT STATUS: HCPCS

## 2017-05-17 PROCEDURE — 74011250637 HC RX REV CODE- 250/637: Performed by: NURSE PRACTITIONER

## 2017-05-17 PROCEDURE — 65270000029 HC RM PRIVATE

## 2017-05-17 PROCEDURE — G8988 SELF CARE GOAL STATUS: HCPCS

## 2017-05-17 PROCEDURE — 82962 GLUCOSE BLOOD TEST: CPT

## 2017-05-17 PROCEDURE — 97165 OT EVAL LOW COMPLEX 30 MIN: CPT

## 2017-05-17 RX ORDER — FUROSEMIDE 10 MG/ML
80 INJECTION INTRAMUSCULAR; INTRAVENOUS DAILY
Status: DISCONTINUED | OUTPATIENT
Start: 2017-05-17 | End: 2017-05-18

## 2017-05-17 RX ORDER — OXYCODONE HYDROCHLORIDE 5 MG/1
2.5 TABLET ORAL
Status: DISCONTINUED | OUTPATIENT
Start: 2017-05-17 | End: 2017-05-18

## 2017-05-17 RX ORDER — OXYCODONE HYDROCHLORIDE 5 MG/1
5 TABLET ORAL
Status: DISCONTINUED | OUTPATIENT
Start: 2017-05-17 | End: 2017-05-18

## 2017-05-17 RX ADMIN — URSODIOL 300 MG: 300 CAPSULE ORAL at 08:51

## 2017-05-17 RX ADMIN — RIFAXIMIN 550 MG: 550 TABLET ORAL at 22:56

## 2017-05-17 RX ADMIN — ACETAMINOPHEN 650 MG: 325 TABLET, FILM COATED ORAL at 07:32

## 2017-05-17 RX ADMIN — Medication 10 ML: at 07:33

## 2017-05-17 RX ADMIN — Medication 10 ML: at 22:56

## 2017-05-17 RX ADMIN — RIFAXIMIN 550 MG: 550 TABLET ORAL at 08:51

## 2017-05-17 RX ADMIN — PANTOPRAZOLE SODIUM 40 MG: 40 TABLET, DELAYED RELEASE ORAL at 07:32

## 2017-05-17 RX ADMIN — ACETAMINOPHEN 650 MG: 325 TABLET, FILM COATED ORAL at 17:57

## 2017-05-17 RX ADMIN — ACETAMINOPHEN 650 MG: 325 TABLET, FILM COATED ORAL at 12:03

## 2017-05-17 RX ADMIN — INSULIN LISPRO 2 UNITS: 100 INJECTION, SOLUTION INTRAVENOUS; SUBCUTANEOUS at 12:04

## 2017-05-17 RX ADMIN — ACETAMINOPHEN 650 MG: 325 TABLET, FILM COATED ORAL at 23:09

## 2017-05-17 RX ADMIN — WARFARIN SODIUM 7.5 MG: 2.5 TABLET ORAL at 12:03

## 2017-05-17 RX ADMIN — FUROSEMIDE 80 MG: 10 INJECTION, SOLUTION INTRAMUSCULAR; INTRAVENOUS at 07:32

## 2017-05-17 RX ADMIN — METOPROLOL TARTRATE 25 MG: 25 TABLET ORAL at 23:03

## 2017-05-17 RX ADMIN — SIMVASTATIN 10 MG: 10 TABLET, FILM COATED ORAL at 22:56

## 2017-05-17 RX ADMIN — URSODIOL 300 MG: 300 CAPSULE ORAL at 17:57

## 2017-05-17 RX ADMIN — RIFAXIMIN 550 MG: 550 TABLET ORAL at 15:22

## 2017-05-17 RX ADMIN — DILTIAZEM HYDROCHLORIDE 120 MG: 120 CAPSULE, EXTENDED RELEASE ORAL at 08:51

## 2017-05-17 RX ADMIN — Medication 10 ML: at 15:22

## 2017-05-17 RX ADMIN — ALLOPURINOL 100 MG: 100 TABLET ORAL at 08:51

## 2017-05-17 NOTE — ADT AUTH CERT NOTES
Patient Demographics        Patient Name [address] Sex  Address Phone       Dex Seat [phone] Male 1955 [address] [phone] (Home)  [phone] (Mobile) *Preferred*           CSN:       [id number]           Admit Date: Admit Time Room Bed       May 12, 2017  6:42  [[id number]] 01 [[id number]]           Attending Providers        Provider Pager From To       Alyssa Alvarado MD  17            Emergency Contact(s)        Name Relation Home Work Mobile       Ladan Mcginnis [phone]         Utilization Review           Musculoskeletal Surgery or Procedure GRG - Care Day 6 (2017) by Yanique Charles RN        Review Entered Review Status       2017 Completed       Details              Care Day: 6 Care Date: 2017 Level of Care: Inpatient Floor       Guideline Day 3        Level Of Care       (X) * Activity level acceptable       ( ) * Complete discharge planning              Clinical Status       (X) * Operative site and other wounds acceptable       (X) * Temperature status acceptable       (X) * No infection, or status acceptable       (X) * No blood loss, or problem resolved       (X) * Pain and nausea absent or adequately managed       (X) * Vascular, soft tissue, and wound status acceptable       (X) * Fracture or injury absent or status acceptable       (X) * No spinal surgery, or status acceptable       (X) * No bone harvest, or donor site acceptable       ( ) * General Discharge Criteria met              Interventions       (X) * Intake acceptable       ( ) * No inpatient interventions needed              2017 2:25 PM EDT by Jeanne Mauricio       Subject: Additional Clinical Information       17  GEN:                        NAD.  AOx3   ABD:                        S/NT/ND   RLE:  Dressing C/D/I - AQUACEL DRESSING CHANGED - PULL DRAIN TODAY                                              motor                                              Calf nttp (Bilat)                                              Sensate all distribution to light touch                                              1+ dp/pt pulses, foot perfused  POD #6 RIGHT TOTAL KNEE REPLACEMENT. Satisfactory progress. Acute post-operative blood-loss anemia - expected  Pull drain today - low output  Appreciate medical management team - Cirrhosis, CAMPOVERDE, HERBIE - continue lasixVitals-97.7, 71, 16, 116/53, 96%. Abnl labs-inr 1.7pt 17.4glucose 111bun 41creat 2.23albumin 2.2Meds-tylenol 650 mg po q 6 hrszyloprim 100 mg po q dcardizem 120 mg po q dferrous sulfate tab 325 mg po q hslasix 80 mg iv q dsliding scale insulinlopressor 25 mg po q hsprotonix 40 mg po q dxifaxan 550 mg po tidzocor 10 mg po q hsactigall 300 mg po bidcoumadin 7.5 mg po x 1Plan-prn po pain mgtknee immobilizerwbatd/c foleydiabetic dietheel reliefincentive spirometryPT/OT                                     * Milestone                  Musculoskeletal Surgery or Procedure GRG - Care Day 5 (5/16/2017) by Ruiz Machuca RN        Review Entered Review Status       5/16/2017 Completed       Details              Care Day: 5 Care Date: 5/16/2017 Level of Care: Inpatient Floor       Guideline Day 3        Level Of Care       (X) * Activity level acceptable       ( ) * Complete discharge planning              Clinical Status       (X) * Operative site and other wounds acceptable       (X) * Temperature status acceptable       (X) * No infection, or status acceptable       (X) * No blood loss, or problem resolved       (X) * Pain and nausea absent or adequately managed       (X) * Vascular, soft tissue, and wound status acceptable       (X) * Fracture or injury absent or status acceptable       (X) * No spinal surgery, or status acceptable       (X) * No bone harvest, or donor site acceptable       ( ) * General Discharge Criteria met              Interventions       (X) * Intake acceptable       ( ) * No inpatient interventions needed              5/16/2017 1:08 PM EDT by Rima Garcia       Subject: Additional Clinical Information       5-16-17Pt feeling ok.   continued improvement in renal function.   UOP stable.   Pt has significant LE edema and scrotal edema.   No cp,sob,n/v/d. Echavarria in place. Vitals-101/52, 67, 16, 98.2, 97%. Abnl labs-rbc 3.26hct 26.9plt 94ubr 1.6pt 16.7glucose 136bun 42creat 2.49ca 8.1Meds-tylenol 650 mg po q 6 hrszyloprim 100 mg po q dcardizem 120 mg po q dferrous sulfate 325 mg po q hslasix 80 mg iv x 1sliding scale insulinchronulac 10 g po tidlopressor 25 mg po q hsprotonix 40 mg po q dxifaxan 550 mg tid pozocor 10 mg po qhspericolace po bidactigall 300 mg po bidcoumadin 7.5 mg po x 1Plan-diabetic dietfoleyheel reliefincentive spirometryOT/PTout of bed in chairlabs                                   * Milestone                  Musculoskeletal Surgery or Procedure GRG - Care Day 4 (5/15/2017) by Michelle Kennedy RN        Review Entered Review Status       5/15/2017 Completed       Details              Care Day: 4 Care Date: 5/15/2017 Level of Care: Inpatient Floor       Guideline Day 3        Level Of Care       (X) * Activity level acceptable       ( ) * Complete discharge planning              Clinical Status       (X) * Operative site and other wounds acceptable       (X) * Temperature status acceptable       (X) * No infection, or status acceptable       ( ) * No blood loss, or problem resolved       (X) * Pain and nausea absent or adequately managed       (X) * Vascular, soft tissue, and wound status acceptable       (X) * Fracture or injury absent or status acceptable       (X) * No spinal surgery, or status acceptable       (X) * No bone harvest, or donor site acceptable       ( ) * General Discharge Criteria met              Interventions       (X) * Intake acceptable       ( ) * No inpatient interventions needed              5/15/2017 4:04 PM EDT by Rickie Fair Steven Doing       Subject: Additional Clinical Information       5-15-17Pt c/o scrotal edema today.  Echavarria in place.   No cp, sob, n/v/d.   Cr improving today.   UOP stable. Vitals-133/64, 74, 16, 98.4,95%. Abnl labs-wbc 3.7rbc 2.82hgb 7. 7hct 23.4plt 82irn 1.7pt 17.6glucose 127bun 42creat 3.05ca 7.3Meds-tylenol 650 mg po q 6 hrszyloprim 100 mg po q dcardizem 120 mg po q dferrous sulfate 325 mg po q hssliding scale insulin sclopressor 25 mg po q hsprotonix 40 mg po q dxifaxan 550 mg po tidpericolace po bidzocor 10 mg po q hsactigall 300 mg po bidcoumadin 7.5 mg po x 1sodium bicarb 75 meq iv 100 cc/hr iv contPlan-transfuse 2 u prbclabsdiabetis mgtdiabetic dietdressingheel relief incentive spirometrycompression stockingsoob to chairoob with assistance                                   * Milestone

## 2017-05-17 NOTE — PROGRESS NOTES
Problem: Self Care Deficits Care Plan (Adult)  Goal: *Acute Goals and Plan of Care (Insert Text)  Occupational Therapy Goals  Initiated: 5/17/2017    1. Patient will perform grooming with supervision/set-up sitting in chair within 7 day(s). 2. Patient will perform bathing with mod A from chair within 7 day(s). 3. Patient will perform upper body dressing and lower body dressing with mod A within 7 day(s). 4. Patient will perform toilet transfers with mod A within 7 day(s). 5. Patient will perform all aspects of toileting with min A within 7 day(s). OCCUPATIONAL THERAPY EVALUATION  Patient: Sumeet Hernadez III (75 y.o. male)  Date: 5/17/2017  Primary Diagnosis: STATUS POST RIGHT TOTAL KNEE REPLACEMENT  Failed total right knee replacement, sequela  Procedure(s) (LRB):  REMOVAL OF PROSTALAC SPACER, RIGHT TOTAL KNEE REVISION (Right) 5 Days Post-Op   Precautions:   Fall, WBAT      ASSESSMENT :  Based on the objective data described below, the patient presents with decreased independence with self care and functional mobility following admission for R TKRevision with spacer placement and now in knee immobilizer and noted with increased swelling in scrotum and through VIVI thighs. Attempted to don scrotal sling but he is unable to fit in sling at this time due to swelling. Pt unable to stand due to discomfort through legs and thighs. Recommend continued progression of ADL activity as appropriate and able. Pt does have some AE for home and support of wife. At this time, pt unable to progress OOB and may require rehab pending continued progress and discharge plan. Patient will benefit from skilled intervention to address the above impairments.   Patients rehabilitation potential is considered to be Fair  Factors which may influence rehabilitation potential include:   [X]             None noted  [ ]             Mental ability/status  [ ]             Medical condition  [ ]             Home/family situation and support systems  [ ]             Safety awareness  [ ]             Pain tolerance/management  [ ]             Other:        PLAN :  Recommendations and Planned Interventions:  [X]               Self Care Training                  [X]        Therapeutic Activities  [X]               Functional Mobility Training    [ ]        Cognitive Retraining  [X]               Therapeutic Exercises           [X]        Endurance Activities  [X]               Balance Training                   [ ]        Neuromuscular Re-Education  [ ]               Visual/Perceptual Training     [X]   Home Safety Training  [X]               Patient Education                 [X]        Family Training/Education  [ ]               Other (comment):     Frequency/Duration: Patient will be followed by occupational therapy 5 times a week to address goals. Discharge Recommendations: Rehab and Home Health  Further Equipment Recommendations for Discharge: TBD       SUBJECTIVE:   Patient stated I just cant get up, I dont have any room to stand with the walker.       OBJECTIVE DATA SUMMARY:   HISTORY:   Past Medical History:   Diagnosis Date    Acute kidney failure with tubular necrosis ([facility] 2017     DR Armand Mauro    Adverse effect of anesthesia       violent with anesthesia in 1969    Anesthesia complication 8847     violent after anesthesia     Arrhythmia      Arthritis      Atrial tachycardia (HCC)       DR Duane Comment    Cataract       LEFT    Chronic cough 2014-present    Chronic kidney disease       elevated creatine level    Chronic pain      Cirrhosis of liver (HCC)      CKD (chronic kidney disease) stage 3, GFR 30-59 ml/min       DR GARCIA    Coagulation defects       COUMADIN    Diabetes ([facility]      Esophageal tear 2011    Gastric varices 2016     Dr. Emiliano Murcia    GERD (gastroesophageal reflux disease) 2007     TORE ESOPHAGUS    Gouty arthropathy      Iron deficiency anemia       RECEIVED IRON INFUSIONS     Lymphedema       RLL  CAMPOVERDE (nonalcoholic steatohepatitis) 2016     Dr. Aidan Tanner Spider angioma 2016     upper torso- Dr. Bethanie Johnson [facility]) [address], [place]    Umbilical hernia 8780     Past Surgical History:   Procedure Laterality Date    HX GI   2007     EGD X3 FOR ESOPHAGUS REPAIR    HX HERNIA REPAIR Right 2015    HX KNEE REPLACEMENT Right 2011    HX KNEE REPLACEMENT Right 10/31/2016     REVISION WITH SPACER    HX ORTHOPAEDIC   2010     LEFT KNEE ARTHROSCOPY    HX ORTHOPAEDIC   2011     RT WRIST, ELBOW CARTILIDGE REPAIR    HX ORTHOPAEDIC         RIGHT KNEE SURGERY  X4    HX OTHER SURGICAL Bilateral       TEAR DUCTS    HX OTHER SURGICAL Right 1978     fatty tumor arm    HX TONSILLECTOMY            Prior Level of Function/Home Situation: pt was independent prior to admission. He was still walking with walker at baseline but independent with all basic ADl activity. He does not drive any longer and has support from wife at home. His wife does work full time. Expanded or extensive additional review of patient history:      Home Situation  Home Environment: Private residence  # Steps to Enter: 4  Rails to Enter: Yes  Hand Rails : Right  One/Two Story Residence: One story  # of Interior Steps: 5  Interior Rails: Right  Lift Chair Available: No  Living Alone: No  Support Systems: Family member(s)  Patient Expects to be Discharged to[de-identified] Private residence  Current DME Used/Available at Home: [employer] Stairs or Shower Type: Shower  [X]  Right hand dominant             [ ]  Left hand dominant     EXAMINATION OF PERFORMANCE DEFICITS:  Cognitive/Behavioral Status:  Neurologic State: Alert  Orientation Level: Oriented X4  Cognition: Appropriate for age attention/concentration  Perception: Appears intact  Perseveration: No perseveration noted  Safety/Judgement: Good awareness of safety precautions     Skin: dressing intact     Edema: scrotum and VIVI upper thighs. Hearing: Auditory  Auditory Impairment: None  Hearing Aids/Status: Bilateral     Vision/Perceptual:                           Acuity: Within Defined Limits          Range of Motion:     AROM: Within functional limits  PROM: Within functional limits                       Strength:     Strength: Within functional limits                 Coordination:  Coordination: Within functional limits  Fine Motor Skills-Upper: Right Intact; Left Intact    Gross Motor Skills-Upper: Right Intact; Left Intact     Tone & Sensation:     Tone: Normal  Sensation: Intact                       Balance:  Sitting: Intact  Standing:  (unable to progress with standing tasks)     Functional Mobility and Transfers for ADLs:  Bed Mobility:  Rolling: Minimum assistance  Supine to Sit: Minimum assistance (due to LE swelling and scrotal swelling)  Sit to Supine: Minimum assistance     Transfers:  Sit to Stand:  (unable to progress with standing)  Stand to Sit:  (unable to progress with standing)  Bed to Chair:  (unable to progress with standing)  Toilet Transfer :  (unable to progress with standing; priest in place)     ADL Assessment:  Feeding: Supervision     Oral Facial Hygiene/Grooming: Supervision     Bathing: Maximum assistance     Upper Body Dressing: Supervision (for gown only)     Lower Body Dressing: Maximum assistance     Toileting: Moderate assistance (priest in place)                 ADL Intervention and task modifications:   Pt was able to complete lower body dressing from EOB. Pt attempted to don scrotal sling with reacher and was able to don over feet but unable to adjust over thighs due to extreme swelling. Pt repositioned in bed with scrotum elevated with towels and a sheet to assist with edema management.                                           Cognitive Retraining  Safety/Judgement: Good awareness of safety precautions     Functional Measure:  Barthel Index:      Bathin  Bladder: 0  Bowels: 10  Groomin  Dressing: 0  Feeding: 10  Mobility: 5  Stairs: 5  Toilet Use: 0  Transfer (Bed to Chair and Back): 10  Total: 45         Barthel and G-code impairment scale:  Percentage of impairment CH  0% CI  1-19% CJ  20-39% CK  40-59% CL  60-79% CM  80-99% CN  100%   Barthel Score 0-100 100 99-80 79-60 59-40 20-39 1-19    0   Barthel Score 0-20 20 17-19 13-16 9-12 5-8 1-4 0      The Barthel ADL Index: Guidelines  1. The index should be used as a record of what a patient does, not as a record of what a patient could do. 2. The main aim is to establish degree of independence from any help, physical or verbal, however minor and for whatever reason. 3. The need for supervision renders the patient not independent. 4. A patient's performance should be established using the best available evidence. Asking the patient, friends/relatives and nurses are the usual sources, but direct observation and common sense are also important. However direct testing is not needed. 5. Usually the patient's performance over the preceding 24-48 hours is important, but occasionally longer periods will be relevant. 6. Middle categories imply that the patient supplies over 50 per cent of the effort. 7. Use of aids to be independent is allowed. Thee Henry., Barthel, D.W. (9805). Functional evaluation: the Barthel Index. [address] [facility] (14)2. [facility] Chavo, DAMIANJNAOMI, Ethel Littlejohn., Nayana Conley., [address] (1999). Measuring the change indisability after inpatient rehabilitation; comparison of the responsiveness of the Barthel Index and Functional Lynn Measure. Journal of Neurology, Neurosurgery, and Psychiatry, 66(4), 846-783. Tasha Castellanos, N.J.A, SIRENA DennisJ.NIKOLAI, & Danita Rizzo, M.A. (2004.) Assessment of post-stroke quality of life in cost-effectiveness studies: The usefulness of the Barthel Index and the EuroQoL-5D. Quality of Life Research, 13, 591-13      G codes:   In compliance with CMSs Claims Based Outcome Reporting, the following G-code set was chosen for this patient based on their primary functional limitation being treated: The outcome measure chosen to determine the severity of the functional limitation was the Barthel Index with a score of 45/100 which was correlated with the impairment scale. · Self Care:               - CURRENT STATUS:    CK - 40%-59% impaired, limited or restricted               - GOAL STATUS:           CI - 1%-19% impaired, limited or restricted               - D/C STATUS:                       ---------------To be determined---------------      Occupational Therapy Evaluation Charge Determination   History Examination Decision-Making   LOW Complexity : Brief history review  MEDIUM Complexity : 3-5 performance deficits relating to physical, cognitive , or psychosocial skils that result in activity limitations and / or participation restrictions MEDIUM Complexity : Patient may present with comorbidities that affect occupational performnce. Miniml to moderate modification of tasks or assistance (eg, physical or verbal ) with assesment(s) is necessary to enable patient to complete evaluation       Based on the above components, the patient evaluation is determined to be of the following complexity level: LOW   Pain:  Pain Scale 1: Numeric (0 - 10)  Pain Intensity 1: 0              Activity Tolerance:   VSS throughout session. After treatment:   [X] Patient left in no apparent distress sitting up in chair  [ ] Patient left in no apparent distress in bed  [X] Call bell left within reach  [X] Nursing notified  [ ] Caregiver present  [ ] Bed alarm activated      COMMUNICATION/EDUCATION:   The patients plan of care was discussed with: Physical Therapist and Registered Nurse.  [X] Home safety education was provided and the patient/caregiver indicated understanding. [X] Patient/family have participated as able in goal setting and plan of care.   [ ] Patient/family agree to work toward stated goals and plan of care. [ ] Patient understands intent and goals of therapy, but is neutral about his/her participation. [ ] Patient is unable to participate in goal setting and plan of care. This patients plan of care is appropriate for delegation to [facility].      Thank you for this referral.  Rayvon Kussmaul, OT  Time Calculation: 39 mins

## 2017-05-17 NOTE — PROGRESS NOTES
Bedside shift change report given to Hortencia Salmon RN (oncoming nurse) by JULITO Marks RN (offgoing nurse). Report included the following information SBAR.

## 2017-05-17 NOTE — PROGRESS NOTES
Problem: Mobility Impaired (Adult and Pediatric)  Goal: *Acute Goals and Plan of Care (Insert Text)  Physical Therapy Goals  Initiated 5/13/2017    1. Patient will move from supine to sit and sit to supine and scoot up and down in bed with modified independence within 4 days. 2. Patient will perform sit to stand with minimal assistance/contact guard assist within 4 days. 3. Patient will ambulate with modified independence for 300 feet with the least restrictive device within 4 days. 4. Patient will ascend/descend 5 stairs with 1 handrail(s) with modified independence within 4 days. 5. Patient will perform home exercise program per protocol with independence within 4 days. 6. Patient will demonstrate AROM 0-90 degrees in operative joint within 4 days. Reviewed chart, pt cleared by RN. Pt received supine in bed. Pt continues to present w/ LE and scrotal edema, reports increased swelling in abdomen. Reported \"he woke up this morning and felt bump under back and realized it was fluid\". Pt willing to do bed exercises again today as pt feels he is not able to amb d/t edema. Pt performed BLE exercises x10 (SLR, quad sets, ankle pumps, heel slides (LLE)); reported increase ROM w/ heel slide using LLE compared to yesterday (reported minimal ROM d/t edema). Pt proceeded to report hospital stay and use of knee immobilizer through Friday. Will continue to follow and attempt gait training tomorrow as able and tolerated. Pt still planning to d/c home. Pt performed stairs 5/16/17 but may benefit from additional stair training if being d/c Friday.       Loulou Oropeza, PTA

## 2017-05-17 NOTE — PROGRESS NOTES
Nephrology Progress Note  Andree Jarvis III  Date of Admission : 5/12/2017    CC: Follow up for HERBIE       Assessment and Plan     HERBIE on CKD:  - 2/2 toradol + ATN from hypotension  - Cr continues to improve  - cont daily lasix for now  - daily labs  - cont with priest for now    CKD III:  - baseline Cr around 1.8  - likely from chronic HTN and DM2    Anemia:  - hgb stable  - s/p 2 units of blood on 5/15    Cirrhosis, CAMPOVERDE    DM2:  - per primary service    HTN:  - BP stable    Gout:  - cont allopurinol    Staph infection of right TKR, S/P removal 5/12/17       Interval History:  Seen and examined. Feeling ok. Continued improvement in renal function. UOP stable. Scrotal edema slightly better. No cp, sob, n/v/d. Current Medications: all current  Medications have been eviewed in EPIC  Review of Systems: Pertinent items are noted in HPI.     Objective:  Vitals:    Vitals:    05/16/17 1537 05/16/17 2015 05/17/17 0223 05/17/17 0835   BP: 132/63 130/60 99/44 116/53   Pulse: 74 72 65 71   Resp: 16 16 16 16   Temp: 97.6 °F (36.4 °C) 97.9 °F (36.6 °C) 98.3 °F (36.8 °C) 97.7 °F (36.5 °C)   SpO2: 98% 97% 96% 96%   Weight:       Height:         Intake and Output:  05/17 0701 - 05/17 1900  In: -   Out: 910 [Urine:900; Drains:10]  05/15 1901 - 05/17 0700  In: -   Out: 5534 [Urine:3875; Drains:80]    Physical Examination:  General: NAD,Conversant   Neck:  Supple, no mass  Resp:  Lungs CTA B/L, no wheezing , normal respiratory effort  CV:  RRR,  no murmur or rub, 3+ b/l LE edema  GI:  Soft, NT, + Bowel sounds, no hepatosplenomegaly  Neurologic:  Non focal  Psych:             AAO x 3 appropriate affect   Skin:  No Rash  :  Priest in place, + scrotal edema    []    High complexity decision making was performed  []    Patient is at high-risk of decompensation with multiple organ involvement    Lab Data Personally Reviewed: I have reviewed all the pertinent labs, microbiology data and radiology studies during assessment. Recent Labs      05/17/17   0226  05/16/17   0635  05/15/17   0633   NA  139  136  136   K  4.0  4.3  4.1   CL  107  107  106   CO2  25  23  25   GLU  111*  136*  127*   BUN  41*  42*  42*   CREA  2.23*  2.49*  3.05*   CA  8.6  8.1*  7.3*   MG  1.7  1.8  1.6   PHOS  3.5   --    --    ALB  2.2*   --    --    INR  1.7*  1.6*  1.7*     Recent Labs      05/16/17   0635  05/15/17   0633   WBC  4.6  3.7*   HGB  9.0*  7.7*   HCT  26.9*  23.4*   PLT  94*  82*     Lab Results   Component Value Date/Time    Specimen Description: URINE 03/15/2012 11:55 AM     Lab Results   Component Value Date/Time    Culture result: MRSA NOT PRESENT 02/13/2017 03:15 PM    Culture result:  02/13/2017 03:15 PM         Screening of patient nares for MRSA is for surveillance purposes and, if positive, to facilitate isolation considerations in high risk settings. It is not intended for automatic decolonization interventions per se as regimens are not sufficiently effective to warrant routine use.     Culture result: NO GROWTH ON SOLID MEDIA AT 14 DAYS 11/10/2016 04:05 PM    Culture result:  11/10/2016 04:05 PM     STAPHYLOCOCCUS EPIDERMIDIS  ISOLATED FROM THIO BROTH ONLY      Culture result:  11/10/2016 04:05 PM     PRELIMINARY RESULT OF GRAM POSITIVE COCCI IN CLUSTERS  SEEN ON GRAM STAIN OF THE THIO BROTH  CALLED TO AND READ BACK BY  Kelvin Higgins AT 1054, 11/13/16 DB      Culture result: (THERE IS NO AEROBIC CULTURE ORDER) 11/10/2016 04:05 PM     Recent Results (from the past 24 hour(s))   GLUCOSE, POC    Collection Time: 05/16/17 11:46 AM   Result Value Ref Range    Glucose (POC) 136 (H) 65 - 100 mg/dL    Performed by Angelika Shi    GLUCOSE, POC    Collection Time: 05/16/17  4:49 PM   Result Value Ref Range    Glucose (POC) 178 (H) 65 - 100 mg/dL    Performed by Bi Bray    GLUCOSE, POC    Collection Time: 05/16/17  9:43 PM   Result Value Ref Range    Glucose (POC) 258 (H) 65 - 100 mg/dL    Performed by Bi Bray MAGNESIUM    Collection Time: 05/17/17  2:26 AM   Result Value Ref Range    Magnesium 1.7 1.6 - 2.4 mg/dL   PROTHROMBIN TIME + INR    Collection Time: 05/17/17  2:26 AM   Result Value Ref Range    INR 1.7 (H) 0.9 - 1.1      Prothrombin time 17.4 (H) 9.0 - 11.1 sec   RENAL FUNCTION PANEL    Collection Time: 05/17/17  2:26 AM   Result Value Ref Range    Sodium 139 136 - 145 mmol/L    Potassium 4.0 3.5 - 5.1 mmol/L    Chloride 107 97 - 108 mmol/L    CO2 25 21 - 32 mmol/L    Anion gap 7 5 - 15 mmol/L    Glucose 111 (H) 65 - 100 mg/dL    BUN 41 (H) 6 - 20 MG/DL    Creatinine 2.23 (H) 0.70 - 1.30 MG/DL    BUN/Creatinine ratio 18 12 - 20      GFR est AA 36 (L) >60 ml/min/1.73m2    GFR est non-AA 30 (L) >60 ml/min/1.73m2    Calcium 8.6 8.5 - 10.1 MG/DL    Phosphorus 3.5 2.6 - 4.7 MG/DL    Albumin 2.2 (L) 3.5 - 5.0 g/dL   GLUCOSE, POC    Collection Time: 05/17/17  6:22 AM   Result Value Ref Range    Glucose (POC) 107 (H) 65 - 100 mg/dL    Performed by Ktaie Mccauley MD  [address]  Phone - [phone]   Fax - [phone]  www. [facility]

## 2017-05-17 NOTE — PROGRESS NOTES
LATE PROGRESS NOTE ENTRY FROM PATIENT ENCOUNTER YESTERDAY AT 2:30 PM    Complaints: none   Events: none      GEN:  NAD. AOx3   ABD:  S/NT/ND   RLE:  Dressing C/D/I - AQUACEL DRESSING CHANGED   5/5 motor    Calf nttp (Bilat)    Sensate all distribution to light touch    1+ dp/pt pulses, foot perfused      Lab Results   Component Value Date/Time    HGB 9.0 05/16/2017 06:35 AM    INR 1.7 05/17/2017 02:26 AM       Lab Results   Component Value Date/Time    Sodium 139 05/17/2017 02:26 AM    Potassium 4.0 05/17/2017 02:26 AM    Chloride 107 05/17/2017 02:26 AM    CO2 25 05/17/2017 02:26 AM    BUN 41 05/17/2017 02:26 AM    Creatinine 2.23 05/17/2017 02:26 AM    Calcium 8.6 05/17/2017 02:26 AM    Magnesium 1.7 05/17/2017 02:26 AM    Phosphorus 3.5 05/17/2017 02:26 AM            POD #5 RIGHT TOTAL KNEE REPLACEMENT. Satisfactory progress. Acute post-operative blood-loss anemia - expected  Appreciate medical management team - Cirrhosis, CAMPOVERDE, HERBIE - continue lasix  ABX: Complete   PATHWAY: D/C [place] per protocol  DVT Prophylaxis: Coumadin (adjusted per pharmacy, target 1.7-2.2), SCD, TIGRE   Weight Bearing: WBAT RLE - KNEE IMMOBILIZER x 1 week  Pain Control: PRN oral narcotics held  Anticipated Discharge Date: TBD   Disposition: Home, HHPT.

## 2017-05-17 NOTE — PROGRESS NOTES
Complaints: none   Events: none      GEN:  NAD. AOx3   ABD:  S/NT/ND   RLE:  Dressing C/D/I - AQUACEL DRESSING CHANGED - PULL DRAIN TODAY   5/5 motor    Calf nttp (Bilat)    Sensate all distribution to light touch    1+ dp/pt pulses, foot perfused      Lab Results   Component Value Date/Time    HGB 9.0 05/16/2017 06:35 AM    INR 1.7 05/17/2017 02:26 AM       Lab Results   Component Value Date/Time    Sodium 139 05/17/2017 02:26 AM    Potassium 4.0 05/17/2017 02:26 AM    Chloride 107 05/17/2017 02:26 AM    CO2 25 05/17/2017 02:26 AM    BUN 41 05/17/2017 02:26 AM    Creatinine 2.23 05/17/2017 02:26 AM    Calcium 8.6 05/17/2017 02:26 AM    Magnesium 1.7 05/17/2017 02:26 AM    Phosphorus 3.5 05/17/2017 02:26 AM            POD #6 RIGHT TOTAL KNEE REPLACEMENT. Satisfactory progress. Acute post-operative blood-loss anemia - expected  Pull drain today - low output  Appreciate medical management team - Cirrhosis, CAMPOVERDE, HERBIE - continue lasix  ABX: Complete   PATHWAY: D/C [place] per protocol  DVT Prophylaxis: Coumadin (adjusted per pharmacy, target 1.7-2.2), SCD, TIGRE   Weight Bearing: WBAT RLE - KNEE IMMOBILIZER x 1 week  Pain Control: PRN oral narcotics held  Anticipated Discharge Date: TBD   Disposition: Home, HHPT.

## 2017-05-17 NOTE — PROGRESS NOTES
Pharmacist Note  Warfarin Dosing  Consult provided for this 62 y.o.male to manage warfarin for Afib, s/p orthopedic surgery     INR Goal: 2 - 3  Home regimen/ tablet size: 5 mg PO daily    Drugs that may increase INR: None  Drugs that may decrease INR: None  Other current anticoagulants/ drugs that may increase bleeding risk: None  Risk factors: None  Daily INR ordered: YES    Recent Labs      05/17/17   0226  05/16/17   0635  05/15/17   0633   HGB   --   9.0*  7.7*   INR  1.7*  1.6*  1.7*     Date               INR                  Dose  5/12                1.3                   5 mg  5/13                1.4                   5 mg  5/14                1.7                   5 mg  5/15  1.7  7.5 mg (received PRBC)  5/16                1.6                   7.5 mg  5/17                1.7                                                                                                   Assessment/ Plan: Will order warfarin 7.5 mg PO x 1 dose. Pharmacy will continue to monitor daily and adjust therapy as indicated. Please contact the pharmacist at  or  for outpatient recommendations if needed.

## 2017-05-17 NOTE — PROGRESS NOTES
Bedside and Verbal shift change report given to Hernan Mohan (oncoming nurse) by Linda Hogue (offgoing nurse). Report included the following information SBAR, Kardex, Procedure Summary, Intake/Output, MAR and Accordion.

## 2017-05-17 NOTE — PROGRESS NOTES
Assessemnt/Plan:   Daily Progress Note: 2017    Admit date: 2017  6:42 AM    Hospitalist Progress Note   Chang Arrington, [facility] Greg [phone]; Call physician on-call through the  7pm-7am          PCP: James Green MD   In Hospital Procedure:   Procedure(s):  REMOVAL OF PROSTALAC SPACER, RIGHT TOTAL [address] [place]  Consultants this Hospitalization:   IP CONSULT TO HOSPITALIST  IP CONSULT TO [address] [place] [address] Procedure:   Procedure(s):  REMOVAL OF PROSTALAC SPACER, RIGHT TOTAL KNEE REVISION                 NAME:  Emma James      :  1963  MRN:  [id number]    Admission Summary:   Emma James is a 60 y.o. male who presents with declining renal function. Onset of symptoms was gradual with unchanged course since that time. Patient is currently admitted to the Orthopedic service and is postop post revision right total knee. We are asked to see the patient in consult to assist in managing HERBIE.        Interval history / Subjective:   No acute complaint, no abdominal pain       Assessment & Plan:      HERBIE on CKD  -creatinine improving, IVF discontinued on 5/15  -on blood transfusion  -diuretics, toradol and NSAID stopped  -nephrologist on board  - Cr moving in correct direction.   Lab Results   Component Value Date/Time    Creatinine 2.23 2017 02:26 AM            Hx of DM-II  -finger stick glucose 117-194/24 hrs  -continue sliding scale  -he said he doesn't take insulin at home  -A1c 5.7  -continue diet control  Lab Results   Component Value Date/Time    Glucose 111 2017 02:26 AM    Glucose (POC) 153 2017 11:52 AM         HTN  -on diltiazem and metoprolol, monitor BP  - good control   BP Readings from Last 1 Encounters:   17 116/53         Leukopenia   -possible due to meds, improving  -monitor cbc  Lab Results   Component Value Date/Time    WBC 4.6 2017 06:35 AM         CAMPOVERDE Cirrhosis  -on lactulose and rfaximin  -stable liver function  - no confusion     Anemia multifactorial  -on ferrous sulfate  -no evidence of active bleeding  -H/H trending down, 2 units PRBC on 5/15, monitor H/H  Hgb up to 9 range 5/16   Lab Results   Component Value Date/Time    Hemoglobin (POC) 8.0 10/31/2016 01:37 PM    HGB 9.0 05/16/2017 06:35 AM            Chronic thrombocytopenia likely due to liver cirrhosis  -improving, monitor platelet  -patient on coumadin, watch for bleeding  - stable at 80 to 90 range   Lab Results   Component Value Date/Time    PLATELET 94 04/80/7101 06:35 AM          Hx of atrial tachycardia  -rate normal, on metoprolol and diltiazem  Pulse Readings from Last 1 Encounters:   05/17/17 71         Hx of gout  -continue on allupurinol, and ursodil     S/p removal of hardware and revision of right total knee replacement  -on vancomycin  -management per orthopedic surgeon        Code status: Full Code  DVT prophylaxis: coumadin      Care Plan discussed with: Patient/Family, Nurse and         Subjective:     ROS      Review of Systems: unchanged over past 24 hours    Constitutional       weakness  Eyes        Ears,Nose, Mouth, Throat     Cardiovascular    No cp  Respiratory     No sob  Gastrointestinal    No n/v/d  Genitourinary     Scrotal edema, marked   Musculoskeletal    No having pain   Integumentary (skin and/or breast)     Neurological     Diminished feeling in legs, chronic. Psychiatric        Endocrine        Hematologic/Lymphatic      Allergic/Immunologic             Could NOT obtain due to:       Objective:     VITALS:   Last 24hrs VS reviewed since prior progress note.  Most recent are:  Patient Vitals for the past 24 hrs:   Temp Pulse Resp BP SpO2   05/17/17 0835 97.7 °F (36.5 °C) 71 16 116/53 96 %   05/17/17 0223 98.3 °F (36.8 °C) 65 16 99/44 96 %   05/16/17 2015 97.9 °F (36.6 °C) 72 16 130/60 97 %   05/16/17 1537 97.6 °F (36.4 °C) 74 16 132/63 98 %       Intake/Output Summary (Last 24 hours) at 05/17/17 1433  Last data filed at 05/17/17 1156   Gross per 24 hour   Intake                0 ml   Output             4045 ml   Net            -4045 ml        PHYSICAL EXAM:  General appearance:   acute distress  ,     Alert;           conversant      Eyes: anicteric sclerae, moist conjunctivae;     no lid-lag;    + PERRLA;    HENT: Atraumatic;   oropharynx clear with:  moist  mucous membranes      Neck: Trachea midline; FROM, supple, no thyromegaly or lymphadenopathy;    Lungs: CTA, with normal respiratory effort and no intercostal retractions    CV: RRR, no MRGs;    Scrotal edema, marked. Abdomen: Soft, non-tender with finding c/c ascites; no masses or HSM;    Extremities: +++ edema peripheral        Skin: Normal temperature  ; turgor is fair ; the texture  nl ; no rash,     Neuro: CN 2- 12 intact, no focal motor weakness , s     Psych: Appropriate  affect, alert   and oriented to person, place and time ;            Reviewed most current radiology test results   y  Review and summation of old records today   y  Reviewed patient's current orders and MAR   y  PMH/ reviewed - no change compared to H&P  ______________________________________________________________________  Medicines:    Current Facility-Administered Medications:     furosemide (LASIX) injection 80 mg, 80 mg, IntraVENous, DAILY, Derrick Ruggiero MD, 80 mg at 05/17/17 0732    oxyCODONE IR (ROXICODONE) tablet 5 mg, 5 mg, Oral, Q6H PRN, Iwona Phillips NP    oxyCODONE IR (ROXICODONE) tablet 2.5 mg, 2.5 mg, Oral, Q6H PRN, Iwona Phillips NP    dextrose 10 % infusion 125-250 mL, 125-250 mL, IntraVENous, PRN, Patsy Gould MD    0.9% sodium chloride infusion 250 mL, 250 mL, IntraVENous, PRN, Arlin Shen MD    insulin lispro (HUMALOG) injection, , SubCUTAneous, AC&HS, Patsy Gould MD, 2 Units at 05/17/17 1204    glucose chewable tablet 16 g, 4 Tab, Oral, PRN, Patsy Gould MD    glucagon (GLUCAGEN) injection 1 mg, 1 mg, IntraMUSCular, PRN, Leopold Mylar, MD    0.9% sodium chloride infusion 250 mL, 250 mL, IntraVENous, PRN, Yuki Ge MD    simvastatin (ZOCOR) tablet 10 mg, 10 mg, Oral, QHS, Ivone Johnson NP, 10 mg at 05/16/17 2153    sodium chloride (NS) flush 5-10 mL, 5-10 mL, IntraVENous, Q8H, Renetta Loron, PA-C, 10 mL at 05/17/17 3532    sodium chloride (NS) flush 5-10 mL, 5-10 mL, IntraVENous, PRN, Renetta Loron, PA-C    acetaminophen (TYLENOL) tablet 650 mg, 650 mg, Oral, Q6H, Renetta Loron, PA-C, 650 mg at 05/17/17 1203    acetaminophen (TYLENOL) tablet 650 mg, 650 mg, Oral, Q6H PRN, Renetta Loron, PA-C    naloxone [facility]) injection 0.4 mg, 0.4 mg, IntraVENous, PRN, Renetta Loron, PA-C    hydrOXYzine HCl (ATARAX) tablet 10 mg, 10 mg, Oral, Q8H PRN, Renetta Loron, PA-C    bisacodyl (DULCOLAX) suppository 10 mg, 10 mg, Rectal, DAILY PRN, Renetta Loron, PA-C    metoprolol tartrate (LOPRESSOR) tablet 25 mg, 25 mg, Oral, QHS, Renetta Loron, PA-C, 25 mg at 05/16/17 2153    ursodiol (ACTIGALL) capsule 300 mg, 300 mg, Oral, BID, Renetta Loron, PA-C, 300 mg at 05/17/17 9827    dilTIAZem CD (CARDIZEM CD) capsule 120 mg, 120 mg, Oral, DAILY, Renetta Loron, PA-C, 120 mg at 05/17/17 9802    ferrous sulfate tablet 325 mg, 325 mg, Oral, QHS, Renetta Loron, PA-C, 325 mg at 05/16/17 2153    lactulose (CHRONULAC) solution 10 g, 10 g, Oral, TID, Renetta Loron, PA-C, 10 g at 05/16/17 1013    rifAXIMin (XIFAXAN) tablet 550 mg, 550 mg, Oral, TID, Renetta Loron, PA-C, 550 mg at 05/17/17 7043    allopurinol (ZYLOPRIM) tablet 100 mg, 100 mg, Oral, DAILY, Renetta Loron, PA-C, 100 mg at 05/17/17 3542    pantoprazole (PROTONIX) tablet 40 mg, 40 mg, Oral, ACB, Renetta Loron, PA-C, 40 mg at 05/17/17 9802    Warfarin- pharmacy to dose, , Other, Rx Dosing/Monitoring, Shyrl Prader, MD  Procedures: see electronic medical records for all procedures/Xrays and details which were not copied into this note but were reviewed prior to creation of Plan. LABS:  Recent Labs      05/16/17   0635  05/15/17   0633   WBC  4.6  3.7*   HGB  9.0*  7.7*   HCT  26.9*  23.4*   PLT  94*  82*     Recent Labs      05/17/17   0226  05/16/17   0635  05/15/17   0633   NA  139  136  136   K  4.0  4.3  4.1   CL  107  107  106   CO2  25  23  25   BUN  41*  42*  42*   CREA  2.23*  2.49*  3.05*   GLU  111*  136*  127*   CA  8.6  8.1*  7.3*   MG  1.7  1.8  1.6   PHOS  3.5   --    --      Recent Labs      05/17/17 0226   ALB  2.2*     Recent Labs      05/17/17 0226  05/16/17 0635  05/15/17   0633   INR  1.7*  1.6*  1.7*   PTP  17.4*  16.7*  17.6*      No results for input(s): FE, TIBC, PSAT, FERR in the last 72 hours. Lab Results   Component Value Date/Time    Folate 6.6 11/03/2016 02:46 AM      No results for input(s): PH, PCO2, PO2 in the last 72 hours. No results for input(s): PHI, PO2I, PCO2I in the last 72 hours. No results for input(s): CPK, CKNDX, TROIQ in the last 72 hours.     No lab exists for component: CPKMB  No results found for: CHOL, CHOLX, CHLST, CHOLV, HDL, LDL, DLDL, LDLC, DLDLP, TGL, TGLX, TRIGL, TRIGP, CHHD, CHHDX  Lab Results   Component Value Date/Time    Glucose (POC) 153 05/17/2017 11:52 AM    Glucose (POC) 107 05/17/2017 06:22 AM    Glucose (POC) 258 05/16/2017 09:43 PM    Glucose (POC) 178 05/16/2017 04:49 PM    Glucose (POC) 136 05/16/2017 11:46 AM     Lab Results   Component Value Date/Time    Color YELLOW/STRAW 02/13/2017 03:52 PM    Appearance CLEAR 02/13/2017 03:52 PM    Specific gravity 1.019 02/13/2017 03:52 PM    pH (UA) 5.0 02/13/2017 03:52 PM    Protein NEGATIVE  02/13/2017 03:52 PM    Glucose NEGATIVE  02/13/2017 03:52 PM    Ketone NEGATIVE  02/13/2017 03:52 PM    Bilirubin NEGATIVE  02/13/2017 03:52 PM    Urobilinogen 1.0 02/13/2017 03:52 PM    Nitrites NEGATIVE  02/13/2017 03:52 PM    Leukocyte Esterase NEGATIVE  02/13/2017 03:52 PM    Epithelial cells FEW 02/13/2017 03:52 PM    Bacteria NEGATIVE  02/13/2017 03:52 PM    WBC 0-4 02/13/2017 03:52 PM    RBC 0-5 02/13/2017 03:52 PM           CULTURES:    Lab Results   Component Value Date/Time    Specimen Description: URINE 03/15/2012 11:55 AM    Lab Results   Component Value Date/Time    Culture result: MRSA NOT PRESENT 02/13/2017 03:15 PM    Culture result:  02/13/2017 03:15 PM         Screening of patient nares for MRSA is for surveillance purposes and, if positive, to facilitate isolation considerations in high risk settings. It is not intended for automatic decolonization interventions per se as regimens are not sufficiently effective to warrant routine use. Culture result: NO GROWTH ON SOLID MEDIA AT 14 DAYS 11/10/2016 04:05 PM    Culture result:  11/10/2016 04:05 PM     STAPHYLOCOCCUS EPIDERMIDIS  ISOLATED FROM THIO BROTH ONLY      Culture result:  11/10/2016 04:05 PM     PRELIMINARY RESULT OF GRAM POSITIVE COCCI IN CLUSTERS  SEEN ON GRAM STAIN OF THE THIO BROTH  CALLED TO AND READ BACK BY  Daniela Talavera AT 1054, 11/13/16 DB      Culture result: (THERE IS NO AEROBIC CULTURE ORDER) 11/10/2016 04:05 PM        CT Results (most recent):    Results from [place] encounter on 10/31/16   CT LOW EXT RT WO CONT   Narrative INDICATION:  post-op anemia, eval for hematoma     EXAM: CT right lower extremity. No comparisons. Thin section axial images were obtained. From these sagittal and coronal  reformats were performed. CT dose reduction was achieved through use of a  standardized protocol tailored for this examination and automatic exposure  control for dose modulation. FINDINGS: Patient is post cemented total knee arthroplasty on the right with a  large radiolucent spacer. There is a moderate complex joint effusion. There is  no soft tissue hematoma demonstrated. Mild edema is noted in the subcutaneous tissues slightly asymmetric to the left. There are vascular calcifications.  There are severe atrophy of the right  peroneus brevis and longus muscle bodies. Gas within the soft tissues is not  unexpected post recent surgery. There is diverticulosis of the sigmoid colon. No dilated loops of bowel are  noted within the pelvis         Impression IMPRESSION:  1.  No soft tissue hematoma post right total knee arthroplasty          Uli [place] MD

## 2017-05-18 LAB
ALBUMIN SERPL BCP-MCNC: 2.1 G/DL (ref 3.5–5)
ANION GAP BLD CALC-SCNC: 6 MMOL/L (ref 5–15)
BUN SERPL-MCNC: 39 MG/DL (ref 6–20)
BUN/CREAT SERPL: 18 (ref 12–20)
CALCIUM SERPL-MCNC: 8.7 MG/DL (ref 8.5–10.1)
CHLORIDE SERPL-SCNC: 106 MMOL/L (ref 97–108)
CO2 SERPL-SCNC: 26 MMOL/L (ref 21–32)
CREAT SERPL-MCNC: 2.14 MG/DL (ref 0.7–1.3)
GLUCOSE BLD STRIP.AUTO-MCNC: 122 MG/DL (ref 65–100)
GLUCOSE BLD STRIP.AUTO-MCNC: 155 MG/DL (ref 65–100)
GLUCOSE BLD STRIP.AUTO-MCNC: 157 MG/DL (ref 65–100)
GLUCOSE BLD STRIP.AUTO-MCNC: 167 MG/DL (ref 65–100)
GLUCOSE SERPL-MCNC: 121 MG/DL (ref 65–100)
HGB BLD-MCNC: 8.9 G/DL (ref 12.1–17)
INR PPP: 2 (ref 0.9–1.1)
MAGNESIUM SERPL-MCNC: 1.7 MG/DL (ref 1.6–2.4)
PHOSPHATE SERPL-MCNC: 3.7 MG/DL (ref 2.6–4.7)
POTASSIUM SERPL-SCNC: 4.1 MMOL/L (ref 3.5–5.1)
PROTHROMBIN TIME: 20.9 SEC (ref 9–11.1)
SERVICE CMNT-IMP: ABNORMAL
SODIUM SERPL-SCNC: 138 MMOL/L (ref 136–145)

## 2017-05-18 PROCEDURE — 82962 GLUCOSE BLOOD TEST: CPT

## 2017-05-18 PROCEDURE — 74011250636 HC RX REV CODE- 250/636: Performed by: INTERNAL MEDICINE

## 2017-05-18 PROCEDURE — 85018 HEMOGLOBIN: CPT | Performed by: PHYSICIAN ASSISTANT

## 2017-05-18 PROCEDURE — 85610 PROTHROMBIN TIME: CPT | Performed by: INTERNAL MEDICINE

## 2017-05-18 PROCEDURE — 74011636637 HC RX REV CODE- 636/637: Performed by: HOSPITALIST

## 2017-05-18 PROCEDURE — 74011250637 HC RX REV CODE- 250/637: Performed by: PHYSICIAN ASSISTANT

## 2017-05-18 PROCEDURE — 74011250637 HC RX REV CODE- 250/637: Performed by: ORTHOPAEDIC SURGERY

## 2017-05-18 PROCEDURE — 65270000029 HC RM PRIVATE

## 2017-05-18 PROCEDURE — 80069 RENAL FUNCTION PANEL: CPT | Performed by: INTERNAL MEDICINE

## 2017-05-18 PROCEDURE — 83735 ASSAY OF MAGNESIUM: CPT | Performed by: INTERNAL MEDICINE

## 2017-05-18 PROCEDURE — 74011250637 HC RX REV CODE- 250/637: Performed by: NURSE PRACTITIONER

## 2017-05-18 PROCEDURE — 36415 COLL VENOUS BLD VENIPUNCTURE: CPT | Performed by: INTERNAL MEDICINE

## 2017-05-18 RX ORDER — FUROSEMIDE 10 MG/ML
80 INJECTION INTRAMUSCULAR; INTRAVENOUS 2 TIMES DAILY
Status: DISCONTINUED | OUTPATIENT
Start: 2017-05-18 | End: 2017-05-22

## 2017-05-18 RX ORDER — OXYCODONE HYDROCHLORIDE 5 MG/1
2.5 TABLET ORAL
Status: DISCONTINUED | OUTPATIENT
Start: 2017-05-18 | End: 2017-05-22 | Stop reason: HOSPADM

## 2017-05-18 RX ORDER — WARFARIN SODIUM 5 MG/1
5 TABLET ORAL ONCE
Status: COMPLETED | OUTPATIENT
Start: 2017-05-18 | End: 2017-05-18

## 2017-05-18 RX ADMIN — RIFAXIMIN 550 MG: 550 TABLET ORAL at 09:57

## 2017-05-18 RX ADMIN — ALLOPURINOL 100 MG: 100 TABLET ORAL at 09:53

## 2017-05-18 RX ADMIN — ACETAMINOPHEN 650 MG: 325 TABLET, FILM COATED ORAL at 07:26

## 2017-05-18 RX ADMIN — FUROSEMIDE 80 MG: 10 INJECTION, SOLUTION INTRAMUSCULAR; INTRAVENOUS at 10:00

## 2017-05-18 RX ADMIN — URSODIOL 300 MG: 300 CAPSULE ORAL at 09:54

## 2017-05-18 RX ADMIN — Medication 10 ML: at 07:27

## 2017-05-18 RX ADMIN — LACTULOSE 10 G: 10 SOLUTION ORAL at 09:57

## 2017-05-18 RX ADMIN — RIFAXIMIN 550 MG: 550 TABLET ORAL at 19:19

## 2017-05-18 RX ADMIN — DILTIAZEM HYDROCHLORIDE 120 MG: 120 CAPSULE, EXTENDED RELEASE ORAL at 09:59

## 2017-05-18 RX ADMIN — METOPROLOL TARTRATE 25 MG: 25 TABLET ORAL at 21:50

## 2017-05-18 RX ADMIN — SIMVASTATIN 10 MG: 10 TABLET, FILM COATED ORAL at 22:56

## 2017-05-18 RX ADMIN — Medication 10 ML: at 14:00

## 2017-05-18 RX ADMIN — INSULIN LISPRO 2 UNITS: 100 INJECTION, SOLUTION INTRAVENOUS; SUBCUTANEOUS at 17:58

## 2017-05-18 RX ADMIN — ACETAMINOPHEN 650 MG: 325 TABLET, FILM COATED ORAL at 12:09

## 2017-05-18 RX ADMIN — FUROSEMIDE 80 MG: 10 INJECTION, SOLUTION INTRAMUSCULAR; INTRAVENOUS at 17:57

## 2017-05-18 RX ADMIN — URSODIOL 300 MG: 300 CAPSULE ORAL at 17:56

## 2017-05-18 RX ADMIN — ACETAMINOPHEN 650 MG: 325 TABLET, FILM COATED ORAL at 23:00

## 2017-05-18 RX ADMIN — Medication 325 MG: at 21:50

## 2017-05-18 RX ADMIN — INSULIN LISPRO 2 UNITS: 100 INJECTION, SOLUTION INTRAVENOUS; SUBCUTANEOUS at 12:09

## 2017-05-18 RX ADMIN — WARFARIN SODIUM 5 MG: 5 TABLET ORAL at 12:09

## 2017-05-18 RX ADMIN — PANTOPRAZOLE SODIUM 40 MG: 40 TABLET, DELAYED RELEASE ORAL at 07:30

## 2017-05-18 RX ADMIN — ACETAMINOPHEN 650 MG: 325 TABLET, FILM COATED ORAL at 17:56

## 2017-05-18 NOTE — PROGRESS NOTES
Physical Therapy    Reviewed pt chart, pt cleared by RN. Pt deferred gait training after much encouragement as he continues to report inability to walk due to edema. Informed mobility may aid in decrease of fluids, pt continues to decline and even declines transfer to EOB to assess mobility. Encouraged pt to continue LE exercises. Pt continues w/ plan to d/c home and reports he will try to work w/ PT tomorrow if/when edema decreases and when knee immobilizer removed. Will continue to follow pt.      Loulou Oropeza, PTA

## 2017-05-18 NOTE — PROGRESS NOTES
Bedside shift change report given to [phone] (oncoming nurse) by Chester Galicia (offgoing nurse). Report included the following information SBAR, Intake/Output, MAR and Accordion.

## 2017-05-18 NOTE — PROGRESS NOTES
Bedside and Verbal shift change report given to Claudia Maxwell (oncoming nurse) by Yang Caraballo (offgoing nurse). Report included the following information SBAR, Kardex, Intake/Output, MAR and Accordion.

## 2017-05-18 NOTE — PROGRESS NOTES
Assessemnt/Plan:   Daily Progress Note: 2017    Admit date: 2017  6:42 AM    Hospitalist Progress Note   [phone]; Call physician on-call through the  7pm-7am          PCP: Hayley Tolbert MD   In Hospital Procedure:   Procedure(s):  REMOVAL OF PROSTALAC SPACER, RIGHT TOTAL [address] [place]  Consultants this Hospitalization:   IP CONSULT TO HOSPITALIST  IP CONSULT TO [address] [place] [address] Procedure:   Procedure(s):  REMOVAL OF PROSTALAC SPACER, RIGHT TOTAL KNEE REVISION                 NAME:  Masha Lugo      :  1963  MRN:  [id number]    Admission Summary:   Masah Lugo is a 60 y.o. male who presents with declining renal function. Onset of symptoms was gradual with unchanged course since that time. Patient is currently admitted to the Orthopedic service and is postop post revision right total knee. We are asked to see the patient in consult to assist in managing HERBIE.        Interval history / Subjective:   Deeply asleep, lab, chart reviewed, exam limited.        Assessment & Plan:      HERBIE on CKD  -creatinine improving, IVF discontinued on 5/15  -on blood transfusion  -diuretics, toradol and NSAID stopped  -nephrologist on board  - Cr moving in correct direction.   Lab Results   Component Value Date/Time    Creatinine 2.14 2017 03:02 AM            Hx of DM-II  -finger stick glucose 117-194/24 hrs  -continue sliding scale  -he said he doesn't take insulin at home  -A1c 5.7  -continue diet control  Lab Results   Component Value Date/Time    Glucose 121 2017 03:02 AM    Glucose (POC) 167 2017 11:12 AM         HTN  -on diltiazem and metoprolol, monitor BP  - good control   BP Readings from Last 1 Encounters:   17 116/63         Leukopenia   -possible due to meds, improving  -monitor cbc  Lab Results   Component Value Date/Time    WBC 4.6 2017 06:35 AM         CAMPOVERDE Cirrhosis  -on lactulose and rfaximin  -stable liver function  - no confusion     Anemia multifactorial  -on ferrous sulfate  -no evidence of active bleeding  -H/H trending down, 2 units PRBC on 5/15, monitor H/H  Hgb up to 9 range 5/16   Lab Results   Component Value Date/Time    Hemoglobin (POC) 8.0 10/31/2016 01:37 PM    HGB 8.9 05/18/2017 03:02 AM            Chronic thrombocytopenia likely due to liver cirrhosis  -improving, monitor platelet  -patient on coumadin, watch for bleeding  - stable at 80 to 90 range   Lab Results   Component Value Date/Time    PLATELET 94 03/27/9661 06:35 AM          Hx of atrial tachycardia  -rate normal, on metoprolol and diltiazem  Pulse Readings from Last 1 Encounters:   05/18/17 71         Hx of gout  -continue on allupurinol, and ursodil     S/p removal of hardware and revision of right total knee replacement  -on vancomycin  -management per orthopedic surgeon        Code status: Full Code  DVT prophylaxis: coumadin      Care Plan discussed with: Patient/Family, Nurse and         Subjective:     ROS      Review of Systems: unchanged over past 24 hours           Could NOT obtain due to: Deeply asleep      Objective:     VITALS:   Last 24hrs VS reviewed since prior progress note.  Most recent are:  Patient Vitals for the past 24 hrs:   Temp Pulse Resp BP SpO2   05/18/17 0903 97.9 °F (36.6 °C) 71 16 116/63 94 %   05/18/17 0259 97.8 °F (36.6 °C) 67 16 98/52 93 %   05/17/17 2303 97.8 °F (36.6 °C) 75 16 112/54 95 %   05/17/17 2103 97.9 °F (36.6 °C) 73 16 106/54 95 %   05/17/17 1506 98.5 °F (36.9 °C) 67 16 (!) 114/31 98 %       Intake/Output Summary (Last 24 hours) at 05/18/17 1340  Last data filed at 05/18/17 1103   Gross per 24 hour   Intake                0 ml   Output             2775 ml   Net            -2775 ml        PHYSICAL EXAM:   deeply asleep               Reviewed most current radiology test results   y  Review and summation of old records today   y  Reviewed patient's current orders and MAR   y  PMH/SH reviewed - no change compared to H&P  ______________________________________________________________________  Medicines:    Current Facility-Administered Medications:     oxyCODONE IR (ROXICODONE) tablet 2.5 mg, 2.5 mg, Oral, Q6H PRN, Janiya Dowell NP    furosemide (LASIX) injection 80 mg, 80 mg, IntraVENous, BID, Sue Sterling MD    dextrose 10 % infusion 125-250 mL, 125-250 mL, IntraVENous, PRN, Lalito Interiano MD    0.9% sodium chloride infusion 250 mL, 250 mL, IntraVENous, PRN, Thurlow Spurling, MD    insulin lispro (HUMALOG) injection, , SubCUTAneous, AC&HS, Lalito Interiano MD, 2 Units at 05/18/17 1209    glucose chewable tablet 16 g, 4 Tab, Oral, PRN, Lalito Interiano MD    glucagon (GLUCAGEN) injection 1 mg, 1 mg, IntraMUSCular, PRN, Lalito Interiano MD    0.9% sodium chloride infusion 250 mL, 250 mL, IntraVENous, PRN, Sarahi Sebastian MD    simvastatin (ZOCOR) tablet 10 mg, 10 mg, Oral, QHS, Kiesha Bourgeois NP, 10 mg at 05/17/17 2256    sodium chloride (NS) flush 5-10 mL, 5-10 mL, IntraVENous, Q8H, Sandy Cabello PA-C, 10 mL at 05/18/17 4754    sodium chloride (NS) flush 5-10 mL, 5-10 mL, IntraVENous, PRN, Sandy Cabello PA-C    acetaminophen (TYLENOL) tablet 650 mg, 650 mg, Oral, Q6H, Sandy Cabello PA-C, 650 mg at 05/18/17 1209    acetaminophen (TYLENOL) tablet 650 mg, 650 mg, Oral, Q6H PRN, Sandy Cabello PA-C    naloxone [facility]) injection 0.4 mg, 0.4 mg, IntraVENous, PRN, Sandy Genera, PA-C    hydrOXYzine HCl (ATARAX) tablet 10 mg, 10 mg, Oral, Q8H PRN, Sandy Cabello PA-C    bisacodyl (DULCOLAX) suppository 10 mg, 10 mg, Rectal, DAILY PRN, Sandy Cabello PA-C    metoprolol tartrate (LOPRESSOR) tablet 25 mg, 25 mg, Oral, QHS, Sandy Cabello PA-C, 25 mg at 05/17/17 2303    ursodiol (ACTIGALL) capsule 300 mg, 300 mg, Oral, BID, Sandy Cabello PA-C, 300 mg at 05/18/17 0954    dilTIAZem CD (CARDIZEM CD) capsule 120 mg, 120 mg, Oral, DAILY, Oren Rucks, PA-C, 120 mg at 05/18/17 9190    ferrous sulfate tablet 325 mg, 325 mg, Oral, QHS, Oren Rucks, PA-C, 325 mg at 05/16/17 2153    lactulose (CHRONULAC) solution 10 g, 10 g, Oral, TID, Oren Rucks, PA-C, 10 g at 05/18/17 0957    rifAXIMin (XIFAXAN) tablet 550 mg, 550 mg, Oral, TID, Oren Rucks, PA-C, 550 mg at 05/18/17 0957    allopurinol (ZYLOPRIM) tablet 100 mg, 100 mg, Oral, DAILY, Oren Rucks, PA-C, 100 mg at 05/18/17 8496    pantoprazole (PROTONIX) tablet 40 mg, 40 mg, Oral, ACB, Oren Rucks, PA-C, 40 mg at 05/18/17 0730    Warfarin- pharmacy to dose, , Other, Rx Dosing/Monitoring, Rita Bey MD  Procedures: see electronic medical records for all procedures/Xrays and details which were not copied into this note but were reviewed prior to creation of Plan. LABS:  Recent Labs      05/18/17 0302 05/16/17   0635   WBC   --   4.6   HGB  8.9*  9.0*   HCT   --   26.9*   PLT   --   94*     Recent Labs      05/18/17 0302 05/17/17 0226 05/16/17   0635   NA  138  139  136   K  4.1  4.0  4.3   CL  106  107  107   CO2  26  25  23   BUN  39*  41*  42*   CREA  2.14*  2.23*  2.49*   GLU  121*  111*  136*   CA  8.7  8.6  8.1*   MG  1.7  1.7  1.8   PHOS  3.7  3.5   --      Recent Labs      05/18/17 0302 05/17/17 0226   ALB  2.1*  2.2*     Recent Labs      05/18/17 0302 05/17/17 0226 05/16/17   0635   INR  2.0*  1.7*  1.6*   PTP  20.9*  17.4*  16.7*      No results for input(s): FE, TIBC, PSAT, FERR in the last 72 hours. Lab Results   Component Value Date/Time    Folate 6.6 11/03/2016 02:46 AM      No results for input(s): PH, PCO2, PO2 in the last 72 hours. No results for input(s): PHI, PO2I, PCO2I in the last 72 hours. No results for input(s): CPK, CKNDX, TROIQ in the last 72 hours.     No lab exists for component: CPKMB  No results found for: CHOL, CHOLX, CHLST, CHOLV, HDL, LDL, DLDL, LDLC, DLDLP, TGL, TGLX, TRIGL, TRIGP, CHHD, [facility]  Lab Results   Component Value Date/Time    Glucose (POC) 167 05/18/2017 11:12 AM    Glucose (POC) 122 05/18/2017 06:18 AM    Glucose (POC) 186 05/17/2017 10:06 PM    Glucose (POC) 136 05/17/2017 04:21 PM    Glucose (POC) 153 05/17/2017 11:52 AM     Lab Results   Component Value Date/Time    Color YELLOW/STRAW 02/13/2017 03:52 PM    Appearance CLEAR 02/13/2017 03:52 PM    Specific gravity 1.019 02/13/2017 03:52 PM    pH (UA) 5.0 02/13/2017 03:52 PM    Protein NEGATIVE  02/13/2017 03:52 PM    Glucose NEGATIVE  02/13/2017 03:52 PM    Ketone NEGATIVE  02/13/2017 03:52 PM    Bilirubin NEGATIVE  02/13/2017 03:52 PM    Urobilinogen 1.0 02/13/2017 03:52 PM    Nitrites NEGATIVE  02/13/2017 03:52 PM    Leukocyte Esterase NEGATIVE  02/13/2017 03:52 PM    Epithelial cells FEW 02/13/2017 03:52 PM    Bacteria NEGATIVE  02/13/2017 03:52 PM    WBC 0-4 02/13/2017 03:52 PM    RBC 0-5 02/13/2017 03:52 PM           CULTURES:    Lab Results   Component Value Date/Time    Specimen Description: URINE 03/15/2012 11:55 AM    Lab Results   Component Value Date/Time    Culture result: MRSA NOT PRESENT 02/13/2017 03:15 PM    Culture result:  02/13/2017 03:15 PM         Screening of patient nares for MRSA is for surveillance purposes and, if positive, to facilitate isolation considerations in high risk settings. It is not intended for automatic decolonization interventions per se as regimens are not sufficiently effective to warrant routine use.     Culture result: NO GROWTH ON SOLID MEDIA AT 14 DAYS 11/10/2016 04:05 PM    Culture result:  11/10/2016 04:05 PM     STAPHYLOCOCCUS EPIDERMIDIS  ISOLATED FROM THIO BROTH ONLY      Culture result:  11/10/2016 04:05 PM     PRELIMINARY RESULT OF GRAM POSITIVE COCCI IN CLUSTERS  SEEN ON GRAM STAIN OF THE THIO BROTH  CALLED TO AND READ BACK BY  Gemini Lorenzo AT 1054, 11/13/16 DB      Culture result: (THERE IS NO AEROBIC CULTURE ORDER) 11/10/2016 04:05 PM        CT Results (most recent):    Results from [facility] encounter on 10/31/16   CT LOW EXT RT WO CONT   Narrative INDICATION:  post-op anemia, eval for hematoma     EXAM: CT right lower extremity. No comparisons. Thin section axial images were obtained. From these sagittal and coronal  reformats were performed. CT dose reduction was achieved through use of a  standardized protocol tailored for this examination and automatic exposure  control for dose modulation. FINDINGS: Patient is post cemented total knee arthroplasty on the right with a  large radiolucent spacer. There is a moderate complex joint effusion. There is  no soft tissue hematoma demonstrated. Mild edema is noted in the subcutaneous tissues slightly asymmetric to the left. There are vascular calcifications. There are severe atrophy of the right  peroneus brevis and longus muscle bodies. Gas within the soft tissues is not  unexpected post recent surgery. There is diverticulosis of the sigmoid colon. No dilated loops of bowel are  noted within the pelvis         Impression IMPRESSION:  1.  No soft tissue hematoma post right total knee arthroplasty          Janelle Ely MD

## 2017-05-18 NOTE — PROGRESS NOTES
Pharmacist Note  Warfarin Dosing  Consult provided for this 62 y.o.male to manage warfarin for Afib, s/p orthopedic surgery     INR Goal: 2 - 3  Home regimen/ tablet size: 5 mg PO daily    Drugs that may increase INR: None  Drugs that may decrease INR: None  Other current anticoagulants/ drugs that may increase bleeding risk: None  Risk factors: None  Daily INR ordered: YES    Recent Labs      05/18/17   0302  05/17/17   0226  05/16/17   0635   HGB  8.9*   --   9.0*   INR  2.0*  1.7*  1.6*     Date               INR                  Dose  5/12                1.3                   5 mg  5/13                1.4                   5 mg  5/14                1.7                   5 mg  5/15  1.7  7.5 mg (received PRBC)  5/16                1.6                   7.5 mg  5/17                1.7                   7.5 mg  5/18  2.0  5 mg                                                                               Assessment/ Plan: Will order warfarin 5 mg PO x 1 dose. Pharmacy will continue to monitor daily and adjust therapy as indicated. Please contact the pharmacist at  or  for outpatient recommendations if needed.

## 2017-05-18 NOTE — PROGRESS NOTES
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 Feeling ok. Continued improvement in renal function. UOP stable. Scrotal edema improving. No cp, sob, n/v/d. Current Medications: all current  Medications have been eviewed in EPIC  Review of Systems: Pertinent items are noted in HPI.     Objective:  Vitals:    Vitals:    05/17/17 2103 05/17/17 2303 05/18/17 0259 05/18/17 0903   BP: 106/54 112/54 98/52 116/63   Pulse: 73 75 67 71   Resp: 16 16 16 16   Temp: 97.9 °F (36.6 °C) 97.8 °F (36.6 °C) 97.8 °F (36.6 °C) 97.9 °F (36.6 °C)   SpO2: 95% 95% 93% 94%   Weight:       Height:         Intake and Output:  05/18 0701 - 05/18 1900  In: -   Out: 1975 [HUZYV:4155]  05/16 1901 - 05/18 0700  In: -   Out: 3833 [Urine:2575; Drains:20]    Physical Examination:  General: NAD,Conversant   Neck:  Supple, no mass  Resp:  Lungs CTA B/L, no wheezing , normal respiratory effort  CV:  RRR,  no murmur or rub, 3+ b/l LE edema  GI:  Soft, NT, + Bowel sounds, no hepatosplenomegaly  Neurologic:  Non focal  Psych:             AAO x 3 appropriate affect   Skin:  No Rash  :  Echavarria in place, + scrotal edema    []    High complexity decision making was performed  []    Patient is at high-risk of decompensation with multiple organ involvement    Lab Data Personally Reviewed: I have reviewed all the pertinent labs, microbiology data and radiology studies during assessment. Recent Labs      05/18/17   0302  05/17/17   0226  05/16/17   0635   NA  138  139  136   K  4.1  4.0  4.3   CL  106  107  107   CO2  26  25  23   GLU  121*  111*  136*   BUN  39*  41*  42*   CREA  2.14*  2.23*  2.49*   CA  8.7  8.6  8.1*   MG  1.7  1.7  1.8   PHOS  3.7  3.5   --    ALB  2.1*  2.2*   --    INR  2.0*  1.7*  1.6*     Recent Labs      05/18/17   0302  05/16/17   0635   WBC   --   4.6   HGB  8.9*  9.0*   HCT   --   26.9*   PLT   --   94*     Lab Results   Component Value Date/Time    Specimen Description: URINE 03/15/2012 11:55 AM     Lab Results   Component Value Date/Time    Culture result: MRSA NOT PRESENT 02/13/2017 03:15 PM    Culture result:  02/13/2017 03:15 PM         Screening of patient nares for MRSA is for surveillance purposes and, if positive, to facilitate isolation considerations in high risk settings. It is not intended for automatic decolonization interventions per se as regimens are not sufficiently effective to warrant routine use.     Culture result: NO GROWTH ON SOLID MEDIA AT 14 DAYS 11/10/2016 04:05 PM    Culture result:  11/10/2016 04:05 PM     STAPHYLOCOCCUS EPIDERMIDIS  ISOLATED FROM THIO BROTH ONLY      Culture result:  11/10/2016 04:05 PM     PRELIMINARY RESULT OF GRAM POSITIVE COCCI IN CLUSTERS  SEEN ON GRAM STAIN OF THE THIO BROTH  CALLED TO AND READ BACK BY  Riky Powell AT 1054, 11/13/16 DB      Culture result: (THERE IS NO AEROBIC CULTURE ORDER) 11/10/2016 04:05 PM     Recent Results (from the past 24 hour(s))   GLUCOSE, POC    Collection Time: 05/17/17  4:21 PM   Result Value Ref Range    Glucose (POC) 136 (H) 65 - 100 mg/dL    Performed by Doris Gregory, POC    Collection Time: 05/17/17 10:06 PM   Result Value Ref Range    Glucose (POC) 186 (H) 65 - 100 mg/dL    Performed by Ulices Perkins    RENAL FUNCTION PANEL    Collection Time: 05/18/17  3:02 AM   Result Value Ref Range    Sodium 138 136 - 145 mmol/L    Potassium 4.1 3.5 - 5.1 mmol/L Chloride 106 97 - 108 mmol/L    CO2 26 21 - 32 mmol/L    Anion gap 6 5 - 15 mmol/L    Glucose 121 (H) 65 - 100 mg/dL    BUN 39 (H) 6 - 20 MG/DL    Creatinine 2.14 (H) 0.70 - 1.30 MG/DL    BUN/Creatinine ratio 18 12 - 20      GFR est AA 38 (L) >60 ml/min/1.73m2    GFR est non-AA 31 (L) >60 ml/min/1.73m2    Calcium 8.7 8.5 - 10.1 MG/DL    Phosphorus 3.7 2.6 - 4.7 MG/DL    Albumin 2.1 (L) 3.5 - 5.0 g/dL   HEMOGLOBIN    Collection Time: 05/18/17  3:02 AM   Result Value Ref Range    HGB 8.9 (L) 12.1 - 17.0 g/dL   MAGNESIUM    Collection Time: 05/18/17  3:02 AM   Result Value Ref Range    Magnesium 1.7 1.6 - 2.4 mg/dL   PROTHROMBIN TIME + INR    Collection Time: 05/18/17  3:02 AM   Result Value Ref Range    INR 2.0 (H) 0.9 - 1.1      Prothrombin time 20.9 (H) 9.0 - 11.1 sec   GLUCOSE, POC    Collection Time: 05/18/17  6:18 AM   Result Value Ref Range    Glucose (POC) 122 (H) 65 - 100 mg/dL    Performed by Henrique Santana    GLUCOSE, POC    Collection Time: 05/18/17 11:12 AM   Result Value Ref Range    Glucose (POC) 167 (H) 65 - 100 mg/dL    Performed by Bisi Evans MD  [address]  Phone - [phone]   Fax - [phone]  [email or web address]

## 2017-05-19 LAB
ALBUMIN SERPL BCP-MCNC: 2.1 G/DL (ref 3.5–5)
ANION GAP BLD CALC-SCNC: 7 MMOL/L (ref 5–15)
BUN SERPL-MCNC: 36 MG/DL (ref 6–20)
BUN/CREAT SERPL: 18 (ref 12–20)
CALCIUM SERPL-MCNC: 8.7 MG/DL (ref 8.5–10.1)
CHLORIDE SERPL-SCNC: 108 MMOL/L (ref 97–108)
CO2 SERPL-SCNC: 25 MMOL/L (ref 21–32)
CREAT SERPL-MCNC: 2.05 MG/DL (ref 0.7–1.3)
GLUCOSE BLD STRIP.AUTO-MCNC: 118 MG/DL (ref 65–100)
GLUCOSE BLD STRIP.AUTO-MCNC: 157 MG/DL (ref 65–100)
GLUCOSE BLD STRIP.AUTO-MCNC: 178 MG/DL (ref 65–100)
GLUCOSE BLD STRIP.AUTO-MCNC: 251 MG/DL (ref 65–100)
GLUCOSE SERPL-MCNC: 117 MG/DL (ref 65–100)
HGB BLD-MCNC: 9 G/DL (ref 12.1–17)
INR PPP: 2.4 (ref 0.9–1.1)
MAGNESIUM SERPL-MCNC: 1.6 MG/DL (ref 1.6–2.4)
PHOSPHATE SERPL-MCNC: 3.6 MG/DL (ref 2.6–4.7)
POTASSIUM SERPL-SCNC: 3.8 MMOL/L (ref 3.5–5.1)
PROTHROMBIN TIME: 24.5 SEC (ref 9–11.1)
SERVICE CMNT-IMP: ABNORMAL
SODIUM SERPL-SCNC: 140 MMOL/L (ref 136–145)

## 2017-05-19 PROCEDURE — 65270000029 HC RM PRIVATE

## 2017-05-19 PROCEDURE — 74011250636 HC RX REV CODE- 250/636: Performed by: INTERNAL MEDICINE

## 2017-05-19 PROCEDURE — 97530 THERAPEUTIC ACTIVITIES: CPT

## 2017-05-19 PROCEDURE — 74011250637 HC RX REV CODE- 250/637: Performed by: PHYSICIAN ASSISTANT

## 2017-05-19 PROCEDURE — 97116 GAIT TRAINING THERAPY: CPT

## 2017-05-19 PROCEDURE — 74011250637 HC RX REV CODE- 250/637: Performed by: NURSE PRACTITIONER

## 2017-05-19 PROCEDURE — 36415 COLL VENOUS BLD VENIPUNCTURE: CPT | Performed by: INTERNAL MEDICINE

## 2017-05-19 PROCEDURE — 97535 SELF CARE MNGMENT TRAINING: CPT

## 2017-05-19 PROCEDURE — P9045 ALBUMIN (HUMAN), 5%, 250 ML: HCPCS | Performed by: INTERNAL MEDICINE

## 2017-05-19 PROCEDURE — 80069 RENAL FUNCTION PANEL: CPT | Performed by: INTERNAL MEDICINE

## 2017-05-19 PROCEDURE — 83735 ASSAY OF MAGNESIUM: CPT | Performed by: INTERNAL MEDICINE

## 2017-05-19 PROCEDURE — 74011636637 HC RX REV CODE- 636/637: Performed by: HOSPITALIST

## 2017-05-19 PROCEDURE — 74011250637 HC RX REV CODE- 250/637: Performed by: ORTHOPAEDIC SURGERY

## 2017-05-19 PROCEDURE — 85018 HEMOGLOBIN: CPT | Performed by: INTERNAL MEDICINE

## 2017-05-19 PROCEDURE — 82962 GLUCOSE BLOOD TEST: CPT

## 2017-05-19 PROCEDURE — 85610 PROTHROMBIN TIME: CPT | Performed by: INTERNAL MEDICINE

## 2017-05-19 RX ORDER — ALBUMIN HUMAN 50 G/1000ML
25 SOLUTION INTRAVENOUS 2 TIMES DAILY
Status: DISCONTINUED | OUTPATIENT
Start: 2017-05-19 | End: 2017-05-22

## 2017-05-19 RX ORDER — WARFARIN SODIUM 5 MG/1
5 TABLET ORAL ONCE
Status: COMPLETED | OUTPATIENT
Start: 2017-05-19 | End: 2017-05-19

## 2017-05-19 RX ADMIN — WARFARIN SODIUM 5 MG: 5 TABLET ORAL at 12:21

## 2017-05-19 RX ADMIN — RIFAXIMIN 550 MG: 550 TABLET ORAL at 01:13

## 2017-05-19 RX ADMIN — RIFAXIMIN 550 MG: 550 TABLET ORAL at 16:10

## 2017-05-19 RX ADMIN — URSODIOL 300 MG: 300 CAPSULE ORAL at 18:13

## 2017-05-19 RX ADMIN — INSULIN LISPRO 3 UNITS: 100 INJECTION, SOLUTION INTRAVENOUS; SUBCUTANEOUS at 21:44

## 2017-05-19 RX ADMIN — INSULIN LISPRO 2 UNITS: 100 INJECTION, SOLUTION INTRAVENOUS; SUBCUTANEOUS at 12:21

## 2017-05-19 RX ADMIN — METOPROLOL TARTRATE 25 MG: 25 TABLET ORAL at 21:10

## 2017-05-19 RX ADMIN — SIMVASTATIN 10 MG: 10 TABLET, FILM COATED ORAL at 21:12

## 2017-05-19 RX ADMIN — FUROSEMIDE 80 MG: 10 INJECTION, SOLUTION INTRAMUSCULAR; INTRAVENOUS at 18:12

## 2017-05-19 RX ADMIN — ACETAMINOPHEN 650 MG: 325 TABLET, FILM COATED ORAL at 18:13

## 2017-05-19 RX ADMIN — Medication 325 MG: at 21:09

## 2017-05-19 RX ADMIN — ACETAMINOPHEN 650 MG: 325 TABLET, FILM COATED ORAL at 07:06

## 2017-05-19 RX ADMIN — RIFAXIMIN 550 MG: 550 TABLET ORAL at 21:12

## 2017-05-19 RX ADMIN — ACETAMINOPHEN 650 MG: 325 TABLET, FILM COATED ORAL at 12:20

## 2017-05-19 RX ADMIN — URSODIOL 300 MG: 300 CAPSULE ORAL at 09:09

## 2017-05-19 RX ADMIN — RIFAXIMIN 550 MG: 550 TABLET ORAL at 09:09

## 2017-05-19 RX ADMIN — INSULIN LISPRO 2 UNITS: 100 INJECTION, SOLUTION INTRAVENOUS; SUBCUTANEOUS at 16:49

## 2017-05-19 RX ADMIN — PANTOPRAZOLE SODIUM 40 MG: 40 TABLET, DELAYED RELEASE ORAL at 07:06

## 2017-05-19 RX ADMIN — Medication 10 ML: at 16:10

## 2017-05-19 RX ADMIN — FUROSEMIDE 80 MG: 10 INJECTION, SOLUTION INTRAMUSCULAR; INTRAVENOUS at 09:09

## 2017-05-19 RX ADMIN — ALBUMIN (HUMAN) 25 G: 12.5 INJECTION, SOLUTION INTRAVENOUS at 18:13

## 2017-05-19 RX ADMIN — LACTULOSE 10 G: 10 SOLUTION ORAL at 09:09

## 2017-05-19 RX ADMIN — DILTIAZEM HYDROCHLORIDE 120 MG: 120 CAPSULE, EXTENDED RELEASE ORAL at 09:10

## 2017-05-19 RX ADMIN — ALLOPURINOL 100 MG: 100 TABLET ORAL at 09:10

## 2017-05-19 NOTE — PROGRESS NOTES
Assessemnt/Plan:   Daily Progress Note: 2017    Admit date: 2017  6:42 AM    Hospitalist Progress Note   Chang ZAID Mirmarli Maury [facility] [phone]; Call physician on-call through the  7pm-7am          PCP: Neyda Flores MD   In Hospital Procedure:   Procedure(s):  REMOVAL OF PROSTALAC SPACER, RIGHT TOTAL [address]  Consultants this Hospitalization:   IP CONSULT TO HOSPITALIST  IP CONSULT TO [address] [place] [address] Procedure:   Procedure(s):  REMOVAL OF PROSTALAC SPACER, RIGHT TOTAL KNEE REVISION                 NAME:  Blaise Rosa      :  1963  MRN:  [id number]    Admission Summary:   Blaise Rosa is a 60 y.o. male who presents with declining renal function. Onset of symptoms was gradual with unchanged course since that time. Patient is currently admitted to the Orthopedic service and is postop post revision right total knee. We are asked to see the patient in consult to assist in managing HERBIE.        Interval history / Subjective:   anxious to be up, no cp, no sob cont with edema, but some improvement.        Assessment & Plan:      HERBIE on CKD  -creatinine improving, IVF discontinued on 5/15  -on blood transfusion  -diuretics, toradol and NSAID stopped  -nephrologist on board  - Cr moving in correct direction.   Lab Results   Component Value Date/Time    Creatinine 2.05 2017 03:38 AM            Hx of DM-II  -finger stick glucose 117-194/24 hrs  -continue sliding scale  -he said he doesn't take insulin at home  -A1c 5.7  -continue diet control  Lab Results   Component Value Date/Time    Glucose 117 2017 03:38 AM    Glucose (POC) 178 2017 11:59 AM         HTN  -on diltiazem and metoprolol, monitor BP  - good control   BP Readings from Last 1 Encounters:   17 103/51         Leukopenia   -possible due to meds, improving  -monitor cbc  Lab Results   Component Value Date/Time    WBC 4.6 2017 06:35 AM         CAMPOVEDRE Cirrhosis  -on lactulose and rfaximin  -stable liver function  - no confusion    Low albumin with edema, on lasix 80 mg bid . Albumin added. 5/19/2017      Anemia multifactorial  -on ferrous sulfate  -no evidence of active bleeding  -H/H trending down, 2 units PRBC on 5/15, monitor H/H  Hgb up to 9 range 5/16   Lab Results   Component Value Date/Time    Hemoglobin (POC) 8.0 10/31/2016 01:37 PM    HGB 9.0 05/19/2017 03:38 AM            Chronic thrombocytopenia likely due to liver cirrhosis  -improving, monitor platelet  -patient on coumadin, watch for bleeding  - stable at 80 to 90 range   Lab Results   Component Value Date/Time    PLATELET 94 68/50/9729 06:35 AM          Hx of atrial tachycardia  -rate normal, on metoprolol and diltiazem  Pulse Readings from Last 1 Encounters:   05/19/17 74         Hx of gout  -continue on allupurinol, and ursodil     S/p removal of hardware and revision of right total knee replacement  -on vancomycin  -management per orthopedic surgeon        Code status: Full Code  DVT prophylaxis: coumadin      Care Plan discussed with: Patient/Family, Nurse and         Subjective:     ROS    No sob, no cp, no n/v/d/f, cont w scrotal and general edema. No pain issues. Could NOT obtain due to:       Objective:     VITALS:   Last 24hrs VS reviewed since prior progress note.  Most recent are:  Patient Vitals for the past 24 hrs:   Temp Pulse Resp BP SpO2   05/19/17 0839 97.9 °F (36.6 °C) 74 16 103/51 95 %   05/19/17 0336 98.1 °F (36.7 °C) 74 16 105/56 93 %   05/18/17 2149 98.1 °F (36.7 °C) 71 16 106/54 95 %   05/18/17 1448 98 °F (36.7 °C) - - - -   05/18/17 1430 98 °F (36.7 °C) 72 16 115/61 97 %       Intake/Output Summary (Last 24 hours) at 05/19/17 1357  Last data filed at 05/19/17 1232   Gross per 24 hour   Intake              980 ml   Output             5275 ml   Net            -4295 ml        PHYSICAL EXAM:  Visit Vitals    /51 (BP 1 Location: Left arm, BP Patient Position: At rest)    Pulse 74  Temp 97.9 °F (36.6 °C)    Resp 16    Ht 6' 1\" (1.854 m)    Wt 127 kg (280 lb)    SpO2 95%    BMI 36.94 kg/m2               General:  Alert, cooperative, no distress, appears stated age. Head:  Normocephalic, without obvious abnormality, atraumatic. Eyes:  Conjunctivae/  PERRL, EOMs intact. Ears:  Normal   external ears. Nose: Nares normal. Septum midline. Mucosa normal. No drainage or sinus tenderness. Throat: Lips, mucosa, moist.  .   Neck: Supple, symmetrical, trachea midline, no adenopathy, thyroid: no enlargement/tenderness/nodules, no carotid bruit and no JVD. Back:   Symmetric,    Lungs:   Clear to auscultation bilaterally. Chest wall:  No tenderness     Heart:  Regular rate and rhythm,     Abdomen:   C/c ascites.             Skin: Skin color, texture, turgor normal. No rashes or lesions   Lymph nodes: Cervical, supraclavicular, and axillary nodes normal.   Neurologic: Non focal                     Reviewed most current radiology test results   y  Review and summation of old records today   y  Reviewed patient's current orders and MAR   y  PMH/ reviewed - no change compared to H&P  ______________________________________________________________________  Medicines:    Current Facility-Administered Medications:     albumin human 5% (BUMINATE) solution 25 g, 25 g, IntraVENous, BID, Gerson Vogel MD    oxyCODONE IR (ROXICODONE) tablet 2.5 mg, 2.5 mg, Oral, Q6H PRN, Marci Del Rio NP    furosemide (LASIX) injection 80 mg, 80 mg, IntraVENous, BID, Hugo Medrano MD, 80 mg at 05/19/17 0909    dextrose 10 % infusion 125-250 mL, 125-250 mL, IntraVENous, PRN, Meghna Cote MD    0.9% sodium chloride infusion 250 mL, 250 mL, IntraVENous, PRN, Luis Fernando Buchanan MD    insulin lispro (HUMALOG) injection, , SubCUTAneous, AC&HS, Meghna Cote MD, 2 Units at 05/19/17 1221    glucose chewable tablet 16 g, 4 Tab, Oral, PRN, Meghna Cote MD    glucagon (GLUCAGEN) injection 1 mg, 1 mg, IntraMUSCular, PRN, Nicci Grossman MD    0.9% sodium chloride infusion 250 mL, 250 mL, IntraVENous, PRN, Masha Ray MD    simvastatin (ZOCOR) tablet 10 mg, 10 mg, Oral, QHS, Megan Patel NP, 10 mg at 05/18/17 2256    sodium chloride (NS) flush 5-10 mL, 5-10 mL, IntraVENous, Q8H, Gilles Brantley PA-C, 10 mL at 05/18/17 1400    sodium chloride (NS) flush 5-10 mL, 5-10 mL, IntraVENous, PRN, Gilles Brantley PA-C    acetaminophen (TYLENOL) tablet 650 mg, 650 mg, Oral, Q6H, Gilles Brantley, PA-C, 650 mg at 05/19/17 1220    acetaminophen (TYLENOL) tablet 650 mg, 650 mg, Oral, Q6H PRN, Gilles Brantley PA-C    naloxone [facility]) injection 0.4 mg, 0.4 mg, IntraVENous, PRN, Gilles Brantley PA-C    hydrOXYzine HCl (ATARAX) tablet 10 mg, 10 mg, Oral, Q8H PRN, Gilles Brantley PA-C    bisacodyl (DULCOLAX) suppository 10 mg, 10 mg, Rectal, DAILY PRN, Gilles Brantley PA-C    metoprolol tartrate (LOPRESSOR) tablet 25 mg, 25 mg, Oral, QHS, Gilles Brantley, PA-C, 25 mg at 05/18/17 2150    ursodiol (ACTIGALL) capsule 300 mg, 300 mg, Oral, BID, Gilles Brantley PA-C, 300 mg at 05/19/17 8039    dilTIAZem CD (CARDIZEM CD) capsule 120 mg, 120 mg, Oral, DAILY, Gilles Brantley, PA-C, 120 mg at 05/19/17 4691    ferrous sulfate tablet 325 mg, 325 mg, Oral, QHS, Gilles Brantley PA-C, 325 mg at 05/18/17 2150    lactulose (CHRONULAC) solution 10 g, 10 g, Oral, TID, Gilles Brantley PA-C, 10 g at 05/19/17 0893    rifAXIMin (XIFAXAN) tablet 550 mg, 550 mg, Oral, TID, Gilles Pac, PA-C, 550 mg at 05/19/17 6367    allopurinol (ZYLOPRIM) tablet 100 mg, 100 mg, Oral, DAILY, Gilles Pac, PA-C, 100 mg at 05/19/17 2726    pantoprazole (PROTONIX) tablet 40 mg, 40 mg, Oral, ACB, Gilles Pac, PA-C, 40 mg at 05/19/17 8940    Warfarin- pharmacy to dose, , Other, Rx Dosing/Monitoring, Fausto Hampton MD  Procedures: see electronic medical records for all procedures/Xrays and details which were not copied into this note but were reviewed prior to creation of Plan. LABS:  Recent Labs      05/19/17 0338 05/18/17 0302   HGB  9.0*  8.9*     Recent Labs      05/19/17 0338 05/18/17 0302  05/17/17 0226   NA  140  138  139   K  3.8  4.1  4.0   CL  108  106  107   CO2  25  26  25   BUN  36*  39*  41*   CREA  2.05*  2.14*  2.23*   GLU  117*  121*  111*   CA  8.7  8.7  8.6   MG  1.6  1.7  1.7   PHOS  3.6  3.7  3.5     Recent Labs      05/19/17 0338 05/18/17 0302 05/17/17 0226   ALB  2.1*  2.1*  2.2*     Recent Labs      05/19/17 0338 05/18/17 0302 05/17/17 0226   INR  2.4*  2.0*  1.7*   PTP  24.5*  20.9*  17.4*      No results for input(s): FE, TIBC, PSAT, FERR in the last 72 hours. Lab Results   Component Value Date/Time    Folate 6.6 11/03/2016 02:46 AM      No results for input(s): PH, PCO2, PO2 in the last 72 hours. No results for input(s): PHI, PO2I, PCO2I in the last 72 hours. No results for input(s): CPK, CKNDX, TROIQ in the last 72 hours.     No lab exists for component: CPKMB  No results found for: CHOL, CHOLX, CHLST, CHOLV, HDL, LDL, DLDL, LDLC, DLDLP, TGL, TGLX, TRIGL, TRIGP, CHHD, CHHDX  Lab Results   Component Value Date/Time    Glucose (POC) 178 05/19/2017 11:59 AM    Glucose (POC) 118 05/19/2017 06:17 AM    Glucose (POC) 155 05/18/2017 08:47 PM    Glucose (POC) 157 05/18/2017 04:03 PM    Glucose (POC) 167 05/18/2017 11:12 AM     Lab Results   Component Value Date/Time    Color YELLOW/STRAW 02/13/2017 03:52 PM    Appearance CLEAR 02/13/2017 03:52 PM    Specific gravity 1.019 02/13/2017 03:52 PM    pH (UA) 5.0 02/13/2017 03:52 PM    Protein NEGATIVE  02/13/2017 03:52 PM    Glucose NEGATIVE  02/13/2017 03:52 PM    Ketone NEGATIVE  02/13/2017 03:52 PM    Bilirubin NEGATIVE  02/13/2017 03:52 PM    Urobilinogen 1.0 02/13/2017 03:52 PM    Nitrites NEGATIVE  02/13/2017 03:52 PM    Leukocyte Esterase NEGATIVE  02/13/2017 03:52 PM    Epithelial cells FEW 02/13/2017 03:52 PM    Bacteria NEGATIVE  02/13/2017 03:52 PM    WBC 0-4 02/13/2017 03:52 PM    RBC 0-5 02/13/2017 03:52 PM           CULTURES:    Lab Results   Component Value Date/Time    Specimen Description: URINE 03/15/2012 11:55 AM    Lab Results   Component Value Date/Time    Culture result: MRSA NOT PRESENT 02/13/2017 03:15 PM    Culture result:  02/13/2017 03:15 PM         Screening of patient nares for MRSA is for surveillance purposes and, if positive, to facilitate isolation considerations in high risk settings. It is not intended for automatic decolonization interventions per se as regimens are not sufficiently effective to warrant routine use. Culture result: NO GROWTH ON SOLID MEDIA AT 14 DAYS 11/10/2016 04:05 PM    Culture result:  11/10/2016 04:05 PM     STAPHYLOCOCCUS EPIDERMIDIS  ISOLATED FROM THIO BROTH ONLY      Culture result:  11/10/2016 04:05 PM     PRELIMINARY RESULT OF GRAM POSITIVE COCCI IN CLUSTERS  SEEN ON GRAM STAIN OF THE THIO BROTH  CALLED TO AND READ BACK BY  Kelvin Higgins AT 1054, 11/13/16 DB      Culture result: (THERE IS NO AEROBIC CULTURE ORDER) 11/10/2016 04:05 PM        CT Results (most recent):    Results from [place] encounter on 10/31/16   CT LOW EXT RT WO CONT   Narrative INDICATION:  post-op anemia, eval for hematoma     EXAM: CT right lower extremity. No comparisons. Thin section axial images were obtained. From these sagittal and coronal  reformats were performed. CT dose reduction was achieved through use of a  standardized protocol tailored for this examination and automatic exposure  control for dose modulation. FINDINGS: Patient is post cemented total knee arthroplasty on the right with a  large radiolucent spacer. There is a moderate complex joint effusion. There is  no soft tissue hematoma demonstrated. Mild edema is noted in the subcutaneous tissues slightly asymmetric to the left. There are vascular calcifications.  There are severe atrophy of the right  peroneus brevis and longus muscle bodies. Gas within the soft tissues is not  unexpected post recent surgery. There is diverticulosis of the sigmoid colon. No dilated loops of bowel are  noted within the pelvis         Impression IMPRESSION:  1.  No soft tissue hematoma post right total knee arthroplasty          Garrett Vila MD

## 2017-05-19 NOTE — PROGRESS NOTES
..Bedside and Verbal shift change report given to Bob Chicas (oncoming nurse) by Ayla Agustin (offgoing nurse). Report included the following information SBAR.

## 2017-05-19 NOTE — PROGRESS NOTES
Pharmacist Note  Warfarin Dosing  Consult provided for this 60 y.o. male to manage warfarin for Orthopedic Surgery (VTE prophylaxis), hx of Afib    INR Goal: 2 - 3  Home regimen: 5 mg po daily    Drugs that may increase INR: Allopurinol  Drugs that may decrease INR: None  Other current anticoagulants/ drugs that may increase bleeding risk: None  Risk factors: None  Daily INR ordered: YES    Recent Labs      05/19/17   0338  05/18/17   0302  05/17/17   0226   HGB  9.0*  8.9*   --    INR  2.4*  2.0*  1.7*     Date               INR                  Dose  5/12                1.3                   5 mg  5/13                1.4                   5 mg  5/14                1.7                   5 mg  5/15  1.7  7.5 mg (received PRBC)  5/16                1.6                   7.5 mg  5/17                1.7                   7.5 mg  5/18  2.0  5 mg  5/19  2.4  5mg                                                                                 Assessment/ Plan: Will order warfarin 5 mg PO x 1 dose. Pharmacy will continue to monitor daily and adjust therapy as indicated. Please contact the pharmacist at  or  for outpatient recommendations if needed.

## 2017-05-19 NOTE — PROGRESS NOTES
Complaints: scrotal edema   Events: none      GEN:  NAD. AOx3   ABD:  S/NT/ND   RLE:  Dressing C/D/I    5/5 motor    Calf nttp (Bilat)    Sensate all distribution to light touch    1+ dp/pt pulses, foot perfused      Lab Results   Component Value Date/Time    HGB 9.0 05/19/2017 03:38 AM    INR 2.4 05/19/2017 03:38 AM       Lab Results   Component Value Date/Time    Sodium 140 05/19/2017 03:38 AM    Potassium 3.8 05/19/2017 03:38 AM    Chloride 108 05/19/2017 03:38 AM    CO2 25 05/19/2017 03:38 AM    BUN 36 05/19/2017 03:38 AM    Creatinine 2.05 05/19/2017 03:38 AM    Calcium 8.7 05/19/2017 03:38 AM    Magnesium 1.6 05/19/2017 03:38 AM    Phosphorus 3.6 05/19/2017 03:38 AM            POD #3 REVISION/REIMPLANT RIGHT TOTAL KNEE REPLACEMENT. Satisfactory progress. Acute post-op blood-loss anemia - expected  HERBIE on CKD III/Cirrhosis/DM2/HTN/Gout - appreciate medical management  Patient continues to have excess edema and cannot participate in PT due to scrotal edema  Albumin 2.1 - should be replenished per Erma   ABX: Complete   PATHWAY: D/C [place] per protocol  DVT Prophylaxis: Coumadin (adjusted per pharmacy, target 1.7-2.2), SCD, TIGRE   Weight Bearing: WBAT RLE   Pain Control: PRN oral narcotics, celebrex  Anticipated Discharge Date: early next week sometime - TBD   Disposition: Home, HHPT.

## 2017-05-19 NOTE — PROGRESS NOTES
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 Feeling ok. Continued improvement in renal function. Good UOP with lasix. Still with scrotal edema and LE edema. No cp, sob, n/v/d. Current Medications: all current  Medications have been eviewed in EPIC  Review of Systems: Pertinent items are noted in HPI.     Objective:  Vitals:    Vitals:    05/18/17 1448 05/18/17 2149 05/19/17 0336 05/19/17 0839   BP:  106/54 105/56 103/51   Pulse:  71 74 74   Resp:  16 16 16   Temp: 98 °F (36.7 °C) 98.1 °F (36.7 °C) 98.1 °F (36.7 °C) 97.9 °F (36.6 °C)   SpO2:  95% 93% 95%   Weight:       Height:         Intake and Output:  05/19 0701 - 05/19 1900  In: 240 [P.O.:240]  Out: 975 [Urine:975]  05/17 1901 - 05/19 0700  In: 600 [P.O.:600]  Out: 5850 [Urine:5850]    Physical Examination:  General: NAD,Conversant   Neck:  Supple, no mass  Resp:  Lungs CTA B/L, no wheezing , normal respiratory effort  CV:  RRR,  no murmur or rub, 3+ b/l LE edema  GI:  Soft, NT, + Bowel sounds, no hepatosplenomegaly  Neurologic:  Non focal  Psych:             AAO x 3 appropriate affect   Skin:  No Rash  :  Priest in place, + scrotal edema    []    High complexity decision making was performed  []    Patient is at high-risk of decompensation with multiple organ involvement    Lab Data Personally Reviewed: I have reviewed all the pertinent labs, microbiology data and radiology studies during assessment. Recent Labs      05/19/17   0338  05/18/17   0302  05/17/17   0226   NA  140  138  139   K  3.8  4.1  4.0   CL  108  106  107   CO2  25  26  25   GLU  117*  121*  111*   BUN  36*  39*  41*   CREA  2.05*  2.14*  2.23*   CA  8.7  8.7  8.6   MG  1.6  1.7  1.7   PHOS  3.6  3.7  3.5   ALB  2.1*  2.1*  2.2*   INR  2.4*  2.0*  1.7*     Recent Labs      05/19/17   0338  05/18/17   0302   HGB  9.0*  8.9*     Lab Results   Component Value Date/Time    Specimen Description: URINE 03/15/2012 11:55 AM     Lab Results   Component Value Date/Time    Culture result: MRSA NOT PRESENT 02/13/2017 03:15 PM    Culture result:  02/13/2017 03:15 PM         Screening of patient nares for MRSA is for surveillance purposes and, if positive, to facilitate isolation considerations in high risk settings. It is not intended for automatic decolonization interventions per se as regimens are not sufficiently effective to warrant routine use.     Culture result: NO GROWTH ON SOLID MEDIA AT 14 DAYS 11/10/2016 04:05 PM    Culture result:  11/10/2016 04:05 PM     STAPHYLOCOCCUS EPIDERMIDIS  ISOLATED FROM THIO BROTH ONLY      Culture result:  11/10/2016 04:05 PM     PRELIMINARY RESULT OF GRAM POSITIVE COCCI IN CLUSTERS  SEEN ON GRAM STAIN OF THE THIO BROTH  CALLED TO AND READ BACK BY  Chapo Whitman AT 1054, 11/13/16 DB      Culture result: (THERE IS NO AEROBIC CULTURE ORDER) 11/10/2016 04:05 PM     Recent Results (from the past 24 hour(s))   GLUCOSE, POC    Collection Time: 05/18/17  4:03 PM   Result Value Ref Range    Glucose (POC) 157 (H) 65 - 100 mg/dL    Performed by Griselda Heaps, POC    Collection Time: 05/18/17  8:47 PM   Result Value Ref Range    Glucose (POC) 155 (H) 65 - 100 mg/dL    Performed by Tonia Cabrales    RENAL FUNCTION PANEL    Collection Time: 05/19/17  3:38 AM   Result Value Ref Range    Sodium 140 136 - 145 mmol/L    Potassium 3.8 3.5 - 5.1 mmol/L    Chloride 108 97 - 108 mmol/L    CO2 25 21 - 32 mmol/L    Anion gap 7 5 - 15 mmol/L    Glucose 117 (H) 65 - 100 mg/dL    BUN 36 (H) 6 - 20 MG/DL    Creatinine 2.05 (H) 0.70 - 1.30 MG/DL    BUN/Creatinine ratio 18 12 - 20      GFR est AA 40 (L) >60 ml/min/1.73m2    GFR est non-AA 33 (L) >60 ml/min/1.73m2    Calcium 8.7 8.5 - 10.1 MG/DL    Phosphorus 3.6 2.6 - 4.7 MG/DL    Albumin 2.1 (L) 3.5 - 5.0 g/dL   HEMOGLOBIN    Collection Time: 05/19/17  3:38 AM   Result Value Ref Range    HGB 9.0 (L) 12.1 - 17.0 g/dL   MAGNESIUM    Collection Time: 05/19/17  3:38 AM   Result Value Ref Range    Magnesium 1.6 1.6 - 2.4 mg/dL   PROTHROMBIN TIME + INR    Collection Time: 05/19/17  3:38 AM   Result Value Ref Range    INR 2.4 (H) 0.9 - 1.1      Prothrombin time 24.5 (H) 9.0 - 11.1 sec   GLUCOSE, POC    Collection Time: 05/19/17  6:17 AM   Result Value Ref Range    Glucose (POC) 118 (H) 65 - 100 mg/dL    Performed by Luke Galloway MD  [address]  Phone - [phone]   Fax - [phone]  [email or web address]

## 2017-05-19 NOTE — PROGRESS NOTES
Bedside and Verbal shift change report given to Sharon Castanon (oncoming nurse) by Kirti Mendes (offgoing nurse). Report included the following information SBAR, Kardex, Intake/Output, MAR and Recent Results.

## 2017-05-19 NOTE — PROGRESS NOTES
Spiritual Care Partner Volunteer visited patient in [address] [place] [address] [address] on 5/18/17  Documented by:  Everardo Torrez M.Div.    Paging Service [phone])

## 2017-05-19 NOTE — PROGRESS NOTES
Bedside shift change report given to Niki (oncoming nurse) by Ashley Mcduffie (offgoing nurse). Report included the following information SBAR and Kardex.

## 2017-05-19 NOTE — PROGRESS NOTES
CM following for discharge planning. Noted per CM 5/17/17 that patient to discharge to his [facility] in [place], [place] with [facility] provided by [address] [facility]. Initial plan was for discharge over weekend, however patient not yet medically clear for discharge. Anticipate discharge home next week with same plan [facility]). Agency info added to AVS by previous CM, no new CM needs identified at this time.  NIKKI Llamas/CRM

## 2017-05-19 NOTE — PROGRESS NOTES
13: 44-Spoke with Dr. Marilyn Connolly regarding low albumin and plan to give pt albumin; Dr. Marilyn Connolly stated he will put orders in

## 2017-05-19 NOTE — PROGRESS NOTES
Problem: Mobility Impaired (Adult and Pediatric)  Goal: *Acute Goals and Plan of Care (Insert Text)  Physical Therapy Goals  Initiated 5/13/2017    1. Patient will move from supine to sit and sit to supine and scoot up and down in bed with modified independence within 4 days. 2. Patient will perform sit to stand with minimal assistance/contact guard assist within 4 days. 3. Patient will ambulate with modified independence for 300 feet with the least restrictive device within 4 days. 4. Patient will ascend/descend 5 stairs with 1 handrail(s) with modified independence within 4 days. 5. Patient will perform home exercise program per protocol with independence within 4 days. 6. Patient will demonstrate AROM 0-90 degrees in operative joint within 4 days. PHYSICAL THERAPY TREATMENT  Patient: Leonard Kruger III (89 y.o. 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 ASSESSMENT:  Pt received supine in bed, agreeable to gait training today. Prior to PT, OT assisted pt w/ donning of scrotal sling d/t scrotal edema,knee immobilizer donned as well. Pt required CGA-Min A and additional time for bed mobility and transfers. Pt amb approx 80FT w/ RW and SBA, additional time (no LOB or knee buckling noted). Pt w/ short step length and slightly antalgic gait. Pt returned sitting EOB (elevated bed) and then returned supine. Pt performed SLR x10 (RLE) w/ passive-active assist. Pt knee immobilizer removed, ice packs refilled and applied to pt knee. Will continue to follow for gait and exercises.    Progression toward goals:  [ ]      Improving appropriately and progressing toward goals  [X]      Improving slowly and progressing toward goals  [ ]      Not making progress toward goals and plan of care will be adjusted       PLAN:  Patient continues to benefit from skilled intervention to address the above impairments. Continue treatment per established plan of care. Discharge Recommendations:  Home Health  Further Equipment Recommendations for Discharge: Owns RW       SUBJECTIVE:   Patient stated Meagan Castaneda more can happen if ol' stumpy falls on the floor?  pt referring to RLE when offered assist to RLE OOB. OBJECTIVE DATA SUMMARY:   Critical Behavior:  Neurologic State: Alert  Orientation Level: Oriented X4  Cognition: Appropriate for age attention/concentration  Safety/Judgement: Good awareness of safety precautions  Functional Mobility Training:  Bed Mobility:  Supine to Sit: Contact guard assistance; Additional time;Assist x1 (CGA at RLE only)  Sit to Supine: Minimum assistance; Additional time;Assist x1                       Transfers:  Sit to Stand: Contact guard assistance; Additional time;Assist x1 (from elevated bed)  Stand to Sit: Contact guard assistance                             Balance:  Sitting: Intact  Standing: Intact; With support  Ambulation/Gait Training:  Distance (ft): 80 Feet (ft)  Assistive Device: Gait belt;Walker, rolling  Ambulation - Level of Assistance: Stand-by asssistance        Gait Abnormalities: Antalgic;Decreased step clearance (additional time)        Base of Support: Widened     Speed/Renee: Pace decreased (<100 feet/min); Slow  Step Length: Left shortened;Right shortened                                              Neuro Re-Education:     Therapeutic Exercises:   SLR x10 (RLE)  Pain:  Pain Scale 1: Numeric (0 - 10)  Pain Intensity 1: 0              Activity Tolerance:   Good  Please refer to the flowsheet for vital signs taken during this treatment.   After treatment:   [ ] Patient left in no apparent distress sitting up in chair  [X] Patient left in no apparent distress in bed  [X] Call hannah left within reach  [X] Nursing notified  [ ] Caregiver present  [ ] Bed alarm activated      COMMUNICATION/COLLABORATION:   The patients plan of care was discussed with: Registered Nurse Winston Oropeza PTA   Time Calculation: 39 mins

## 2017-05-19 NOTE — PROGRESS NOTES
Problem: Self Care Deficits Care Plan (Adult)  Goal: *Acute Goals and Plan of Care (Insert Text)  Occupational Therapy Goals  Initiated: 5/17/2017    1. Patient will perform grooming with supervision/set-up sitting in chair within 7 day(s). 2. Patient will perform bathing with mod A from chair within 7 day(s). 3. Patient will perform upper body dressing and lower body dressing with mod A within 7 day(s). 4. Patient will perform toilet transfers with mod A within 7 day(s). 5. Patient will perform all aspects of toileting with min A within 7 day(s). OCCUPATIONAL THERAPY TREATMENT  Patient: Joseph Jefferson III (53 y.o. 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 Pt continues with increased swelling through VIVI LE's and scrotum. Pt noted with ace wrap on RLE and cutting in at his ankle and above the knee. Pt removed ace wrap and washed LLE with CHG wipes. Pt provided with re-wrapping LLE with ace wraps in appropriate manner. Progression toward goals:  [X]       Improving appropriately and progressing toward goals  [ ]       Improving slowly and progressing toward goals  [ ]       Not making progress toward goals and plan of care will be adjusted       PLAN:  Patient continues to benefit from skilled intervention to address the above impairments. Continue treatment per established plan of care. Discharge Recommendations: To Be Determined  Further Equipment Recommendations for Discharge:  none       SUBJECTIVE:   Patient stated I am feeling pretty good.       OBJECTIVE DATA SUMMARY:   Cognitive/Behavioral Status:  Neurologic State: Alert  Orientation Level: Oriented X4  Cognition: Appropriate for age attention/concentration  Perception: Appears intact  Perseveration: No perseveration noted  Safety/Judgement: Good awareness of safety precautions     Functional Mobility and Transfers for ADLs:  Bed Mobility:  Rolling: Minimum assistance  Supine to Sit: Contact guard assistance; Additional time;Assist x1   Sit to Supine: Minimum assistance; Additional time;Assist x1      ADL Intervention:     Received pt in bed and completed lower body bathing in bed to assist with positioning and keeping LLE straighten without knee immobilizer off. Pt provided with ace wrapping and knee immobilize for LLE while in bed. Donned scrotal sling with max A overall. Lower Body Bathing  Bathing Assistance: Maximum assistance  Lower Body : Maximum assistance           Lower Body Dressing Assistance  Dressing Assistance: Maximum assistance  Underpants: Maximum assistance  Leg Crossed Method Used: No  Position Performed: Supine  Cues: Verbal cues provided  Adaptive Equipment Used: Reacher           Cognitive Retraining  Safety/Judgement: Good awareness of safety precautions     Pain:  Pain Scale 1: Numeric (0 - 10)  Pain Intensity 1: 0              Activity Tolerance:   VSS throughout session.       After treatment:   [ ] Patient left in no apparent distress sitting up in chair  [X] Patient left in no apparent distress in bed  [X] Call bell left within reach  [X] Nursing notified  [ ] Caregiver present  [ ] Bed alarm activated      COMMUNICATION/COLLABORATION:   The patients plan of care was discussed with: Physical Therapist and Registered Nurse     Ubaldo Flowers OT  Time Calculation: 30 mins

## 2017-05-20 LAB
ALBUMIN SERPL BCP-MCNC: 2.3 G/DL (ref 3.5–5)
ANION GAP BLD CALC-SCNC: 9 MMOL/L (ref 5–15)
BUN SERPL-MCNC: 34 MG/DL (ref 6–20)
BUN/CREAT SERPL: 17 (ref 12–20)
CALCIUM SERPL-MCNC: 8.3 MG/DL (ref 8.5–10.1)
CHLORIDE SERPL-SCNC: 105 MMOL/L (ref 97–108)
CO2 SERPL-SCNC: 26 MMOL/L (ref 21–32)
CREAT SERPL-MCNC: 1.95 MG/DL (ref 0.7–1.3)
GLUCOSE BLD STRIP.AUTO-MCNC: 131 MG/DL (ref 65–100)
GLUCOSE BLD STRIP.AUTO-MCNC: 136 MG/DL (ref 65–100)
GLUCOSE BLD STRIP.AUTO-MCNC: 182 MG/DL (ref 65–100)
GLUCOSE BLD STRIP.AUTO-MCNC: 186 MG/DL (ref 65–100)
GLUCOSE SERPL-MCNC: 117 MG/DL (ref 65–100)
INR PPP: 2.6 (ref 0.9–1.1)
MAGNESIUM SERPL-MCNC: 1.5 MG/DL (ref 1.6–2.4)
PHOSPHATE SERPL-MCNC: 3.3 MG/DL (ref 2.6–4.7)
POTASSIUM SERPL-SCNC: 3.4 MMOL/L (ref 3.5–5.1)
PROTHROMBIN TIME: 26.1 SEC (ref 9–11.1)
SERVICE CMNT-IMP: ABNORMAL
SODIUM SERPL-SCNC: 140 MMOL/L (ref 136–145)

## 2017-05-20 PROCEDURE — 85610 PROTHROMBIN TIME: CPT | Performed by: INTERNAL MEDICINE

## 2017-05-20 PROCEDURE — 65270000029 HC RM PRIVATE

## 2017-05-20 PROCEDURE — 74011636637 HC RX REV CODE- 636/637: Performed by: HOSPITALIST

## 2017-05-20 PROCEDURE — 74011250636 HC RX REV CODE- 250/636: Performed by: INTERNAL MEDICINE

## 2017-05-20 PROCEDURE — P9045 ALBUMIN (HUMAN), 5%, 250 ML: HCPCS | Performed by: INTERNAL MEDICINE

## 2017-05-20 PROCEDURE — 97116 GAIT TRAINING THERAPY: CPT

## 2017-05-20 PROCEDURE — 74011250637 HC RX REV CODE- 250/637: Performed by: NURSE PRACTITIONER

## 2017-05-20 PROCEDURE — 74011250637 HC RX REV CODE- 250/637: Performed by: PHYSICIAN ASSISTANT

## 2017-05-20 PROCEDURE — 83735 ASSAY OF MAGNESIUM: CPT | Performed by: INTERNAL MEDICINE

## 2017-05-20 PROCEDURE — 80069 RENAL FUNCTION PANEL: CPT | Performed by: INTERNAL MEDICINE

## 2017-05-20 PROCEDURE — 74011250637 HC RX REV CODE- 250/637: Performed by: INTERNAL MEDICINE

## 2017-05-20 PROCEDURE — 74011250637 HC RX REV CODE- 250/637: Performed by: ORTHOPAEDIC SURGERY

## 2017-05-20 PROCEDURE — 82962 GLUCOSE BLOOD TEST: CPT

## 2017-05-20 PROCEDURE — 36415 COLL VENOUS BLD VENIPUNCTURE: CPT | Performed by: INTERNAL MEDICINE

## 2017-05-20 PROCEDURE — 97530 THERAPEUTIC ACTIVITIES: CPT

## 2017-05-20 RX ORDER — WARFARIN 4 MG/1
4 TABLET ORAL ONCE
Status: COMPLETED | OUTPATIENT
Start: 2017-05-20 | End: 2017-05-20

## 2017-05-20 RX ORDER — POTASSIUM CHLORIDE 750 MG/1
40 TABLET, FILM COATED, EXTENDED RELEASE ORAL
Status: COMPLETED | OUTPATIENT
Start: 2017-05-20 | End: 2017-05-20

## 2017-05-20 RX ORDER — POTASSIUM CHLORIDE 750 MG/1
40 TABLET, FILM COATED, EXTENDED RELEASE ORAL ONCE
Status: COMPLETED | OUTPATIENT
Start: 2017-05-20 | End: 2017-05-20

## 2017-05-20 RX ADMIN — ALBUMIN (HUMAN) 25 G: 12.5 INJECTION, SOLUTION INTRAVENOUS at 10:46

## 2017-05-20 RX ADMIN — URSODIOL 300 MG: 300 CAPSULE ORAL at 17:24

## 2017-05-20 RX ADMIN — RIFAXIMIN 550 MG: 550 TABLET ORAL at 22:25

## 2017-05-20 RX ADMIN — ALBUMIN (HUMAN) 25 G: 12.5 INJECTION, SOLUTION INTRAVENOUS at 17:35

## 2017-05-20 RX ADMIN — PANTOPRAZOLE SODIUM 40 MG: 40 TABLET, DELAYED RELEASE ORAL at 07:43

## 2017-05-20 RX ADMIN — ACETAMINOPHEN 650 MG: 325 TABLET, FILM COATED ORAL at 22:24

## 2017-05-20 RX ADMIN — ACETAMINOPHEN 650 MG: 325 TABLET, FILM COATED ORAL at 12:23

## 2017-05-20 RX ADMIN — POTASSIUM CHLORIDE 40 MEQ: 750 TABLET, FILM COATED, EXTENDED RELEASE ORAL at 15:58

## 2017-05-20 RX ADMIN — RIFAXIMIN 550 MG: 550 TABLET ORAL at 10:00

## 2017-05-20 RX ADMIN — Medication 325 MG: at 22:25

## 2017-05-20 RX ADMIN — ALLOPURINOL 100 MG: 100 TABLET ORAL at 10:00

## 2017-05-20 RX ADMIN — FUROSEMIDE 80 MG: 10 INJECTION, SOLUTION INTRAMUSCULAR; INTRAVENOUS at 09:59

## 2017-05-20 RX ADMIN — DILTIAZEM HYDROCHLORIDE 120 MG: 120 CAPSULE, EXTENDED RELEASE ORAL at 10:00

## 2017-05-20 RX ADMIN — INSULIN LISPRO 2 UNITS: 100 INJECTION, SOLUTION INTRAVENOUS; SUBCUTANEOUS at 17:23

## 2017-05-20 RX ADMIN — SIMVASTATIN 10 MG: 10 TABLET, FILM COATED ORAL at 22:25

## 2017-05-20 RX ADMIN — Medication 5 ML: at 14:00

## 2017-05-20 RX ADMIN — Medication 10 ML: at 06:17

## 2017-05-20 RX ADMIN — METOPROLOL TARTRATE 25 MG: 25 TABLET ORAL at 22:25

## 2017-05-20 RX ADMIN — POTASSIUM CHLORIDE 40 MEQ: 750 TABLET, FILM COATED, EXTENDED RELEASE ORAL at 10:00

## 2017-05-20 RX ADMIN — FUROSEMIDE 80 MG: 10 INJECTION, SOLUTION INTRAMUSCULAR; INTRAVENOUS at 17:23

## 2017-05-20 RX ADMIN — RIFAXIMIN 550 MG: 550 TABLET ORAL at 15:59

## 2017-05-20 RX ADMIN — WARFARIN SODIUM 4 MG: 4 TABLET ORAL at 12:23

## 2017-05-20 RX ADMIN — URSODIOL 300 MG: 300 CAPSULE ORAL at 10:00

## 2017-05-20 NOTE — PROGRESS NOTES
Bedside shift change report given to Baljinder Campo RN (oncoming nurse) by JULITO Sims RN (offgoing nurse). Report included the following information SBAR.

## 2017-05-20 NOTE — PROGRESS NOTES
Pharmacist Note  Warfarin Dosing  Consult provided for this 60 y.o. male to manage warfarin for Orthopedic Surgery (VTE prophylaxis), hx of Afib    INR Goal: 2 - 3  Home regimen: 5 mg PO daily    Drugs that may increase INR: Allopurinol  Drugs that may decrease INR: None  Other current anticoagulants/ drugs that may increase bleeding risk: None  Risk factors: None  Daily INR ordered: YES    Recent Labs      05/20/17   0339  05/19/17   0338  05/18/17   0302   HGB   --   9.0*  8.9*   INR  2.6*  2.4*  2.0*     Date               INR                  Dose  5/12                1.3                   5 mg  5/13                1.4                   5 mg  5/14                1.7                   5 mg  5/15  1.7  Held (received PRBCs)  5/16                1.6                   7.5 mg  5/17                1.7                   7.5 mg  5/18  2.0  5 mg  5/19  2.4  5 mg  5/20                 2.6                   4 mg                                                                                Assessment/ Plan: Will order warfarin 4 mg PO x 1 dose. Pharmacy will continue to monitor daily and adjust therapy as indicated. Please contact the pharmacist at  for outpatient recommendations if needed.

## 2017-05-20 NOTE — PROGRESS NOTES
Bedside and Verbal shift change report given to Alireza Mccarthy (oncoming nurse) by Kirti Mendes (offgoing nurse). Report included the following information SBAR, ED Summary, Intake/Output, MAR and Recent Results.

## 2017-05-20 NOTE — PROGRESS NOTES
Assessemnt/Plan:   Daily Progress Note: 2017    Admit date: 2017  6:42 AM    Hospitalist Progress Note   Chang Lara, Mundo Reich [phone]; Call physician on-call through the  7pm-7am          PCP: Ladan Perez MD   In Hospital Procedure:   Procedure(s):  REMOVAL OF PROSTALAC SPACER, RIGHT TOTAL [address]  Consultants this Hospitalization:   IP CONSULT TO HOSPITALIST  IP CONSULT TO [address] [place] [address] Procedure:   Procedure(s):  REMOVAL OF PROSTALAC SPACER, RIGHT TOTAL KNEE REVISION                 NAME:  Radha Dillard      :  1963  MRN:  [id number]    Admission Summary:   Radha Dillard is a 60 y.o. male who presents with declining renal function. Onset of symptoms was gradual with unchanged course since that time. Patient is currently admitted to the Orthopedic service and is postop post revision right total knee. We are asked to see the patient in consult to assist in managing HERBIE.        Interval history / Subjective:   2017 : no new issues, feels ok with current rx, believes that the swelling is a bit better. nt.        Assessment & Plan:      HERBIE on CKD  -creatinine improving, IVF discontinued on 5/15  -on blood transfusion  -diuretics, toradol and NSAID stopped  -nephrologist on board  - Cr moving in correct direction. Lab Results   Component Value Date/Time    Creatinine 1.95 2017 03:39 AM       Cont to monitor.  Is improved.      Hx of DM-II  -finger stick glucose 117-194/24 hrs  -continue sliding scale  -he said he doesn't take insulin at home  -A1c 5.7  -continue diet control  Lab Results   Component Value Date/Time    Glucose 117 2017 03:39 AM    Glucose (POC) 136 2017 11:18 AM    no adjustment 2017      HTN  -on diltiazem and metoprolol, monitor BP  - good control   BP Readings from Last 1 Encounters:   17 112/51         Leukopenia   -possible due to meds, improving  -monitor cbc  Lab Results   Component Value Date/Time    WBC 4.6 05/16/2017 06:35 AM         CAMPOVERDE Cirrhosis  -on lactulose and rfaximin  -stable liver function  - no confusion    Low albumin with edema, on lasix 80 mg bid . Albumin added. 5/19/2017      Anemia multifactorial  -on ferrous sulfate  -no evidence of active bleeding  -H/H trending down, 2 units PRBC on 5/15, monitor H/H  Hgb up to 9 range 5/16   Lab Results   Component Value Date/Time    Hemoglobin (POC) 8.0 10/31/2016 01:37 PM    HGB 9.0 05/19/2017 03:38 AM            Chronic thrombocytopenia likely due to liver cirrhosis  -improving, monitor platelet  -patient on coumadin, watch for bleeding  - stable at 80 to 90 range   Lab Results   Component Value Date/Time    PLATELET 94 12/19/2036 06:35 AM          Hx of atrial tachycardia  -rate normal, on metoprolol and diltiazem  Pulse Readings from Last 1 Encounters:   05/20/17 76         Hx of gout  -continue on allupurinol, and ursodil     S/p removal of hardware and revision of right total knee replacement  -on vancomycin  -management per orthopedic surgeon        Code status: Full Code  DVT prophylaxis: coumadin      Care Plan discussed with: Patient/Family, Nurse and         Subjective:     ROS    No sob, no cp, no n/v/d/f, cont w scrotal and general edema- but better. No pain issues. Could NOT obtain due to:       Objective:     VITALS:   Last 24hrs VS reviewed since prior progress note.  Most recent are:  Patient Vitals for the past 24 hrs:   Temp Pulse Resp BP SpO2   05/20/17 0958 98.4 °F (36.9 °C) 76 16 112/51 98 %   05/20/17 0335 98.2 °F (36.8 °C) 68 16 104/53 95 %   05/20/17 0246 98.1 °F (36.7 °C) 91 14 94/46 92 %   05/19/17 1940 98.4 °F (36.9 °C) 83 16 121/63 96 %   05/19/17 1635 97.9 °F (36.6 °C) 77 16 120/65 97 %       Intake/Output Summary (Last 24 hours) at 05/20/17 1441  Last data filed at 05/20/17 1226   Gross per 24 hour   Intake              590 ml   Output             3925 ml   Net            -3335 ml PHYSICAL EXAM:  Visit Vitals    /51 (BP 1 Location: Right arm, BP Patient Position: At rest)    Pulse 76    Temp 98.4 °F (36.9 °C)    Resp 16    Ht 6' 1\" (1.854 m)    Wt 127 kg (280 lb)    SpO2 98%    BMI 36.94 kg/m2               General:  Alert, cooperative, no distress, appears stated age. Head:  Normocephalic, without obvious abnormality, atraumatic. Eyes:     PERRL, EOMs intact. Ears:  Normal   external ears. Nose: Nares normal. Septum midline. Mucosa normal. No drainage or sinus tenderness. Throat: Lips, mucosa, moist.  .   Neck: Supple, symmetrical, trachea midline, no adenopathy, thyroid: no enlargement/tenderness/nodules, no carotid bruit and no JVD. Back:   Symmetric,    Lungs:   Clear to auscultation bilaterally. Chest wall:  No tenderness     Heart:  Regular rate and rhythm,     Abdomen:   C/c ascites.             Skin: Skin color, texture, turgor normal. No rashes or lesions   Lymph nodes: Cervical, supraclavicular, and axillary nodes normal.   Neurologic: Non focal                     Reviewed most current radiology test results   y  Review and summation of old records today   y  Reviewed patient's current orders and MAR   y  PMH/ reviewed - no change compared to H&P  ______________________________________________________________________  Medicines:    Current Facility-Administered Medications:     potassium chloride SR (KLOR-CON 10) tablet 40 mEq, 40 mEq, Oral, ONCE, Dana Meza MD    albumin human 5% (BUMINATE) solution 25 g, 25 g, IntraVENous, BID, Dinorah Green MD, Last Rate: 250 mL/hr at 05/20/17 1046, 25 g at 05/20/17 1046    oxyCODONE IR (ROXICODONE) tablet 2.5 mg, 2.5 mg, Oral, Q6H PRN, Alicia Solares NP    furosemide (LASIX) injection 80 mg, 80 mg, IntraVENous, BID, Aurelio Thomson MD, 80 mg at 05/20/17 0959    dextrose 10 % infusion 125-250 mL, 125-250 mL, IntraVENous, PRN, Elise Oconnor MD    0.9% sodium chloride infusion 250 mL, 250 mL, IntraVENous, PRN, Kay Gonzáles MD    insulin lispro (HUMALOG) injection, , SubCUTAneous, AC&HS, Ruthann Cardona MD, Stopped at 05/20/17 0730    glucose chewable tablet 16 g, 4 Tab, Oral, PRN, Ruthann Cardona MD    glucagon (GLUCAGEN) injection 1 mg, 1 mg, IntraMUSCular, PRN, Ruthann Cardona MD    0.9% sodium chloride infusion 250 mL, 250 mL, IntraVENous, PRN, Tavia Can MD    simvastatin (ZOCOR) tablet 10 mg, 10 mg, Oral, QHS, Liane Thomas, NP, 10 mg at 05/19/17 2112    sodium chloride (NS) flush 5-10 mL, 5-10 mL, IntraVENous, Q8H, [place] Kris, PA-C, 10 mL at 05/20/17 0617    sodium chloride (NS) flush 5-10 mL, 5-10 mL, IntraVENous, PRN, Hilda Ponce, PA-C    acetaminophen (TYLENOL) tablet 650 mg, 650 mg, Oral, Q6H, [place] Ganja, PA-C, 650 mg at 05/20/17 1223    acetaminophen (TYLENOL) tablet 650 mg, 650 mg, Oral, Q6H PRN, Hilda Ganalisha, PA-C    naloxone [facility]) injection 0.4 mg, 0.4 mg, IntraVENous, PRN, Hilda Ponce, PA-C    hydrOXYzine HCl (ATARAX) tablet 10 mg, 10 mg, Oral, Q8H PRN, Hilda Ponce, PA-C    bisacodyl (DULCOLAX) suppository 10 mg, 10 mg, Rectal, DAILY PRN, Hilda Ganalisha, PA-C    metoprolol tartrate (LOPRESSOR) tablet 25 mg, 25 mg, Oral, QHS, [place] Ganja, PA-C, 25 mg at 05/19/17 2110    ursodiol (ACTIGALL) capsule 300 mg, 300 mg, Oral, BID, [place] Kris, PA-C, 300 mg at 05/20/17 1000    dilTIAZem CD (CARDIZEM CD) capsule 120 mg, 120 mg, Oral, DAILY, [place] Kris, PA-C, 120 mg at 05/20/17 1000    ferrous sulfate tablet 325 mg, 325 mg, Oral, QHS, [place] Ganja, PA-C, 325 mg at 05/19/17 2109    lactulose (CHRONULAC) solution 10 g, 10 g, Oral, TID, Hilda Ganja, PA-C, 10 g at 05/19/17 8370    rifAXIMin (XIFAXAN) tablet 550 mg, 550 mg, Oral, TID, [place], PA-C, 550 mg at 05/20/17 1000    allopurinol (ZYLOPRIM) tablet 100 mg, 100 mg, Oral, DAILY, Hilda Ganja, PA-C, 100 mg at 05/20/17 1000    pantoprazole (PROTONIX) tablet 40 mg, 40 mg, Oral, ACB, Timi Solares PA-C, 40 mg at 05/20/17 1551    Warfarin- pharmacy to dose, , Other, Rx Dosing/Monitoring, Alyssa Alvarado MD  Procedures: see electronic medical records for all procedures/Xrays and details which were not copied into this note but were reviewed prior to creation of Plan. LABS:  Recent Labs      05/19/17   0338  05/18/17   0302   HGB  9.0*  8.9*     Recent Labs      05/20/17   0339  05/19/17   0338  05/18/17   0302   NA  140  140  138   K  3.4*  3.8  4.1   CL  105  108  106   CO2  26  25  26   BUN  34*  36*  39*   CREA  1.95*  2.05*  2.14*   GLU  117*  117*  121*   CA  8.3*  8.7  8.7   MG  1.5*  1.6  1.7   PHOS  3.3  3.6  3.7     Recent Labs      05/20/17   0339  05/19/17   0338  05/18/17   0302   ALB  2.3*  2.1*  2.1*     Recent Labs      05/20/17   0339  05/19/17   0338  05/18/17   0302   INR  2.6*  2.4*  2.0*   PTP  26.1*  24.5*  20.9*      No results for input(s): FE, TIBC, PSAT, FERR in the last 72 hours. Lab Results   Component Value Date/Time    Folate 6.6 11/03/2016 02:46 AM      No results for input(s): PH, PCO2, PO2 in the last 72 hours. No results for input(s): PHI, PO2I, PCO2I in the last 72 hours. No results for input(s): CPK, CKNDX, TROIQ in the last 72 hours.     No lab exists for component: CPKMB  No results found for: CHOL, CHOLX, CHLST, CHOLV, HDL, LDL, DLDL, LDLC, DLDLP, TGL, TGLX, TRIGL, TRIGP, CHHD, CHHDX  Lab Results   Component Value Date/Time    Glucose (POC) 136 05/20/2017 11:18 AM    Glucose (POC) 131 05/20/2017 06:16 AM    Glucose (POC) 251 05/19/2017 09:18 PM    Glucose (POC) 157 05/19/2017 04:08 PM    Glucose (POC) 178 05/19/2017 11:59 AM     Lab Results   Component Value Date/Time    Color YELLOW/STRAW 02/13/2017 03:52 PM    Appearance CLEAR 02/13/2017 03:52 PM    Specific gravity 1.019 02/13/2017 03:52 PM    pH (UA) 5.0 02/13/2017 03:52 PM    Protein NEGATIVE  02/13/2017 03:52 PM    Glucose NEGATIVE  02/13/2017 03:52 PM    Ketone NEGATIVE  02/13/2017 03:52 PM    Bilirubin NEGATIVE  02/13/2017 03:52 PM    Urobilinogen 1.0 02/13/2017 03:52 PM    Nitrites NEGATIVE  02/13/2017 03:52 PM    Leukocyte Esterase NEGATIVE  02/13/2017 03:52 PM    Epithelial cells FEW 02/13/2017 03:52 PM    Bacteria NEGATIVE  02/13/2017 03:52 PM    WBC 0-4 02/13/2017 03:52 PM    RBC 0-5 02/13/2017 03:52 PM           CULTURES:    Lab Results   Component Value Date/Time    Specimen Description: URINE 03/15/2012 11:55 AM    Lab Results   Component Value Date/Time    Culture result: MRSA NOT PRESENT 02/13/2017 03:15 PM    Culture result:  02/13/2017 03:15 PM         Screening of patient nares for MRSA is for surveillance purposes and, if positive, to facilitate isolation considerations in high risk settings. It is not intended for automatic decolonization interventions per se as regimens are not sufficiently effective to warrant routine use. Culture result: NO GROWTH ON SOLID MEDIA AT 14 DAYS 11/10/2016 04:05 PM    Culture result:  11/10/2016 04:05 PM     STAPHYLOCOCCUS EPIDERMIDIS  ISOLATED FROM THIO BROTH ONLY      Culture result:  11/10/2016 04:05 PM     PRELIMINARY RESULT OF GRAM POSITIVE COCCI IN CLUSTERS  SEEN ON GRAM STAIN OF THE THIO BROTH  CALLED TO AND READ BACK BY  Daniela Talavera AT 1054, 11/13/16 DB      Culture result: (THERE IS NO AEROBIC CULTURE ORDER) 11/10/2016 04:05 PM        CT Results (most recent):    Results from [place] encounter on 10/31/16   CT LOW EXT RT WO CONT   Narrative INDICATION:  post-op anemia, eval for hematoma     EXAM: CT right lower extremity. No comparisons. Thin section axial images were obtained. From these sagittal and coronal  reformats were performed. CT dose reduction was achieved through use of a  standardized protocol tailored for this examination and automatic exposure  control for dose modulation.      FINDINGS: Patient is post cemented total knee arthroplasty on the right with a  large radiolucent spacer. There is a moderate complex joint effusion. There is  no soft tissue hematoma demonstrated. Mild edema is noted in the subcutaneous tissues slightly asymmetric to the left. There are vascular calcifications. There are severe atrophy of the right  peroneus brevis and longus muscle bodies. Gas within the soft tissues is not  unexpected post recent surgery. There is diverticulosis of the sigmoid colon. No dilated loops of bowel are  noted within the pelvis         Impression IMPRESSION:  1.  No soft tissue hematoma post right total knee arthroplasty          Orlando Aburto MD

## 2017-05-20 NOTE — PROGRESS NOTES
Orthopedic Joint Progress Note    May 20, 2017  Admit Date: 2017  Admit Diagnosis: STATUS POST RIGHT TOTAL KNEE REPLACEMENT  Failed total right knee replacement, sequela    8 Days Post-Op    Subjective:     Belen Whaley III   No pain today, still complains of swelling    Review of Systems: Pertinent items are noted in HPI. Objective:     PT/OT:     PATIENT MOBILITY    Bed Mobility  Rolling: Minimum assistance  Supine to Sit: Contact guard assistance, Additional time, Assist x1 (CGA at E only)  Sit to Supine: Minimum assistance, Additional time, Assist x1  Scooting: Modified independent  Transfers  Sit to Stand: Contact guard assistance, Additional time, Assist x1 (from elevated bed)  Stand to Sit: Contact guard assistance  Bed to Chair:  (unable to progress with standing)      Gait  Base of Support: Widened  Speed/Renee: Pace decreased (<100 feet/min), Slow  Step Length: Left shortened, Right shortened  Gait Abnormalities: Antalgic, Decreased step clearance (additional time)  Ambulation - Level of Assistance: Stand-by asssistance  Distance (ft): 80 Feet (ft)  Assistive Device: Gait belt, Walker, rolling  Rail Use: Right   Stairs - Level of Assistance: Contact guard assistance, Additional time, Assist X2  Number of Stairs Trained: 4  Interventions:  (verbal cues to encourage side stepping)            Vital Signs:    Blood pressure 104/53, pulse 68, temperature 98.2 °F (36.8 °C), resp. rate 16, height 6' 1\" (1.854 m), weight 127 kg (280 lb), SpO2 95 %.   Temp (24hrs), Av.2 °F (36.8 °C), Min:97.9 °F (36.6 °C), Max:98.4 °F (36.9 °C)      Pain Control:   Pain Assessment  Pain Scale 1: Numeric (0 - 10)  Pain Intensity 1: 0  Pain Location 1: Knee  Pain Orientation 1: Right  Pain Description 1: Aching  Pain Intervention(s) 1: Ice, Medication (see MAR)    Meds:  Current Facility-Administered Medications   Medication Dose Route Frequency    warfarin (COUMADIN) tablet 4 mg  4 mg Oral ONCE    albumin human 5% (BUMINATE) solution 25 g  25 g IntraVENous BID    oxyCODONE IR (ROXICODONE) tablet 2.5 mg  2.5 mg Oral Q6H PRN    furosemide (LASIX) injection 80 mg  80 mg IntraVENous BID    dextrose 10 % infusion 125-250 mL  125-250 mL IntraVENous PRN    0.9% sodium chloride infusion 250 mL  250 mL IntraVENous PRN    insulin lispro (HUMALOG) injection   SubCUTAneous AC&HS    glucose chewable tablet 16 g  4 Tab Oral PRN    glucagon (GLUCAGEN) injection 1 mg  1 mg IntraMUSCular PRN    0.9% sodium chloride infusion 250 mL  250 mL IntraVENous PRN    simvastatin (ZOCOR) tablet 10 mg  10 mg Oral QHS    sodium chloride (NS) flush 5-10 mL  5-10 mL IntraVENous Q8H    sodium chloride (NS) flush 5-10 mL  5-10 mL IntraVENous PRN    acetaminophen (TYLENOL) tablet 650 mg  650 mg Oral Q6H    acetaminophen (TYLENOL) tablet 650 mg  650 mg Oral Q6H PRN    naloxone (NARCAN) injection 0.4 mg  0.4 mg IntraVENous PRN    hydrOXYzine HCl (ATARAX) tablet 10 mg  10 mg Oral Q8H PRN    bisacodyl (DULCOLAX) suppository 10 mg  10 mg Rectal DAILY PRN    metoprolol tartrate (LOPRESSOR) tablet 25 mg  25 mg Oral QHS    ursodiol (ACTIGALL) capsule 300 mg  300 mg Oral BID    dilTIAZem CD (CARDIZEM CD) capsule 120 mg  120 mg Oral DAILY    ferrous sulfate tablet 325 mg  325 mg Oral QHS    lactulose (CHRONULAC) solution 10 g  10 g Oral TID    rifAXIMin (XIFAXAN) tablet 550 mg  550 mg Oral TID    allopurinol (ZYLOPRIM) tablet 100 mg  100 mg Oral DAILY    pantoprazole (PROTONIX) tablet 40 mg  40 mg Oral ACB    Warfarin- pharmacy to dose   Other Rx Dosing/Monitoring        LAB:    Lab Results   Component Value Date/Time    INR 2.6 05/20/2017 03:39 AM    INR 2.4 05/19/2017 03:38 AM    INR 2.0 05/18/2017 03:02 AM     Lab Results   Component Value Date/Time    HGB 9.0 05/19/2017 03:38 AM    HGB 8.9 05/18/2017 03:02 AM    HGB 9.0 05/16/2017 06:35 AM       Wound Knee Right (Active)   DRESSING STATUS Clean, dry, and intact 5/19/2017 11:59 PM DRESSING TYPE Elastic bandage 5/19/2017 11:59 PM   SPLINT TYPE/MATERIAL Knee immobilizer 5/19/2017  8:45 AM   Incision site well approximated? Yes 5/17/2017  4:00 PM   Drainage Amount  None 5/19/2017 11:59 PM   Drainage Color Serosanguinous 5/17/2017  8:30 PM   Wound Odor None 5/17/2017  8:30 PM   Periwound Skin Condition Intact 5/18/2017  3:44 PM   Number of days:8       [REMOVED] Wound Knee Anterior (Removed)   Removed 05/17/17 0013   Number of days:1864       [REMOVED] Wound Knee Right (Removed)   Removed 05/17/17 0013   Number of days:198         Physical Exam:  No significant changes    Assessment:      Principal Problem:    Failed total right knee replacement ([address] (5/11/2017)      Overview: REMOVAL OF HARDWARE & REVISION RIGHT TOTAL KNEE REPLACEMENT 5/12/17         Plan:     Continue PT/OT/Rehab  Consult: Rehab team including PT, OT, recreational therapy, and     Patient Expects to be Discharged to[de-identified] Private residence  OK to discharge from orthopaedics.   DVT pprx  PT/OT  Signed By: Divina Blackburn MD

## 2017-05-20 NOTE — PROGRESS NOTES
Nephrology Progress Note  Thomas Orr III  Date of Admission : 5/12/2017    CC: Follow up for HERBIE       Assessment and Plan     HERBIE on CKD:  - 2/2 toradol + ATN from hypotension  - Cr continues to improve and is close to baseline  - cont current diuretics with albumin       Hypokalemia  - kcl 40 meq bid today  Check MG level today     CKD III:  - baseline Cr around 1.8  - likely from chronic HTN and DM2    Anemia:  - hgb stable  - s/p 2 units of blood on 5/15    Cirrhosis, CAMPOVERDE    DM2:  - per primary service    HTN:  - BP stable    Gout:  - cont allopurinol    Staph infection of right TKR, S/P removal 5/12/17       Interval History:  Seen and examined. Feeling ok. Continued improvement in renal function. Good UOP with lasix.    k low  bp stable   No cp, sob, n/v/d. Current Medications: all current  Medications have been eviewed in EPIC  Review of Systems: Pertinent items are noted in HPI. Objective:  Vitals:    Vitals:    05/19/17 1635 05/19/17 1940 05/20/17 0246 05/20/17 0335   BP: 120/65 121/63 94/46 104/53   Pulse: 77 83 91 68   Resp: 16 16 14 16   Temp: 97.9 °F (36.6 °C) 98.4 °F (36.9 °C) 98.1 °F (36.7 °C) 98.2 °F (36.8 °C)   SpO2: 97% 96% 92% 95%   Weight:       Height:         Intake and Output:     05/18 1901 - 05/20 0700  In: 630 [P.O.:630]  Out: 6450 [Urine:6450]    Physical Examination:  General: NAD,Conversant   Neck:  Supple, no mass  Resp:  Lungs CTA B/L, no wheezing , normal respiratory effort  CV:  RRR,  no murmur or rub, 3+ b/l LE edema  GI:  Soft, NT, + Bowel sounds, no hepatosplenomegaly  Neurologic:  Non focal  Psych:             AAO x 3 appropriate affect       []    High complexity decision making was performed  []    Patient is at high-risk of decompensation with multiple organ involvement    Lab Data Personally Reviewed: I have reviewed all the pertinent labs, microbiology data and radiology studies during assessment.     Recent Labs      05/20/17   0176  05/19/17   8667 05/18/17   0302   NA  140  140  138   K  3.4*  3.8  4.1   CL  105  108  106   CO2  26  25  26   GLU  117*  117*  121*   BUN  34*  36*  39*   CREA  1.95*  2.05*  2.14*   CA  8.3*  8.7  8.7   MG   --   1.6  1.7   PHOS  3.3  3.6  3.7   ALB  2.3*  2.1*  2.1*   INR  2.6*  2.4*  2.0*     Recent Labs      05/19/17   0338  05/18/17   0302   HGB  9.0*  8.9*     Lab Results   Component Value Date/Time    Specimen Description: URINE 03/15/2012 11:55 AM     Lab Results   Component Value Date/Time    Culture result: MRSA NOT PRESENT 02/13/2017 03:15 PM    Culture result:  02/13/2017 03:15 PM         Screening of patient nares for MRSA is for surveillance purposes and, if positive, to facilitate isolation considerations in high risk settings. It is not intended for automatic decolonization interventions per se as regimens are not sufficiently effective to warrant routine use.     Culture result: NO GROWTH ON SOLID MEDIA AT 14 DAYS 11/10/2016 04:05 PM    Culture result:  11/10/2016 04:05 PM     STAPHYLOCOCCUS EPIDERMIDIS  ISOLATED FROM THIO BROTH ONLY      Culture result:  11/10/2016 04:05 PM     PRELIMINARY RESULT OF GRAM POSITIVE COCCI IN CLUSTERS  SEEN ON GRAM STAIN OF THE THIO BROTH  CALLED TO AND READ BACK BY  Edison Blackwood AT 1054, 11/13/16 DB      Culture result: (THERE IS NO AEROBIC CULTURE ORDER) 11/10/2016 04:05 PM     Recent Results (from the past 24 hour(s))   GLUCOSE, POC    Collection Time: 05/19/17 11:59 AM   Result Value Ref Range    Glucose (POC) 178 (H) 65 - 100 mg/dL    Performed by CASSIA BURROWS    GLUCOSE, POC    Collection Time: 05/19/17  4:08 PM   Result Value Ref Range    Glucose (POC) 157 (H) 65 - 100 mg/dL    Performed by [address] [place], [place] [id number] [facility], POC    Collection Time: 05/19/17  9:18 PM   Result Value Ref Range    Glucose (POC) 251 (H) 65 - 100 mg/dL    Performed by Scotty Liu    RENAL FUNCTION PANEL    Collection Time: 05/20/17  3:39 AM   Result Value Ref Range    Sodium 140 136 - 145 mmol/L Potassium 3.4 (L) 3.5 - 5.1 mmol/L    Chloride 105 97 - 108 mmol/L    CO2 26 21 - 32 mmol/L    Anion gap 9 5 - 15 mmol/L    Glucose 117 (H) 65 - 100 mg/dL    BUN 34 (H) 6 - 20 MG/DL    Creatinine 1.95 (H) 0.70 - 1.30 MG/DL    BUN/Creatinine ratio 17 12 - 20      GFR est AA 42 (L) >60 ml/min/1.73m2    GFR est non-AA 35 (L) >60 ml/min/1.73m2    Calcium 8.3 (L) 8.5 - 10.1 MG/DL    Phosphorus 3.3 2.6 - 4.7 MG/DL    Albumin 2.3 (L) 3.5 - 5.0 g/dL   PROTHROMBIN TIME + INR    Collection Time: 05/20/17  3:39 AM   Result Value Ref Range    INR 2.6 (H) 0.9 - 1.1      Prothrombin time 26.1 (H) 9.0 - 11.1 sec   GLUCOSE, POC    Collection Time: 05/20/17  6:16 AM   Result Value Ref Range    Glucose (POC) 131 (H) 65 - 100 mg/dL    Performed by Carlos Stinson MD  [address]  Phone - [phone]   Fax - [phone]  [email or web address]. [email or web address]

## 2017-05-20 NOTE — PROGRESS NOTES
Problem: Mobility Impaired (Adult and Pediatric)  Goal: *Acute Goals and Plan of Care (Insert Text)  Physical Therapy Goals  Initiated 5/13/2017    1. Patient will move from supine to sit and sit to supine and scoot up and down in bed with modified independence within 4 days. 2. Patient will perform sit to stand with minimal assistance/contact guard assist within 4 days. 3. Patient will ambulate with modified independence for 300 feet with the least restrictive device within 4 days. 4. Patient will ascend/descend 5 stairs with 1 handrail(s) with modified independence within 4 days. 5. Patient will perform home exercise program per protocol with independence within 4 days. 6. Patient will demonstrate AROM 0-90 degrees in operative joint within 4 days. PHYSICAL THERAPY TREATMENT  Patient: Andree Jarvis III (33 y.o. 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 Transfers from sit to supine with heavy use of bed rails and elevating bed. Assisted patient to bathroom. Afterwards, walked patient back to EOB, and assisted patient in donning supportive strap. Patient then able to ambulate farther today, and reports feeling better during session. Gait progressing well, considering limitations with immobilizer. DBP remains low, but patient reports this is his norm, assymptomatic. Recommend continued progression per POC.   Progression toward goals:  [ ]      Improving appropriately and progressing toward goals  [X]      Improving slowly and progressing toward goals  [ ]      Not making progress toward goals and plan of care will be adjusted       PLAN:  Patient continues to benefit from skilled intervention to address the above impairments. Continue treatment per established plan of care. Discharge Recommendations:  Home Health  Further Equipment Recommendations for Discharge: owns RW       SUBJECTIVE:   Patient stated I'd be fine, if everything wasn't so swollen.       OBJECTIVE DATA SUMMARY:   Critical Behavior:  Neurologic State: Alert  Orientation Level: Oriented X4  Cognition: Appropriate for age attention/concentration  Safety/Judgement: Good awareness of safety precautions      Functional Mobility Training:  Bed Mobility:     Supine to Sit: Minimum assistance              Transfers:  Sit to Stand: Other (comment); Minimum assistance (elevated bed)  Stand to Sit: Minimum assistance                             Balance:  Sitting: Intact; With support  Standing: Intact; With support  Ambulation/Gait Training:  Distance (ft): 120 Feet (ft)  Assistive Device: Walker, rolling;Gait belt  Ambulation - Level of Assistance: Contact guard assistance        Gait Abnormalities: Antalgic              Speed/Renee: Slow  Step Length: Left shortened;Right shortened                                   Activity Tolerance:   good  Please refer to the flowsheet for vital signs taken during this treatment.   After treatment:   [X] Patient left in no apparent distress sitting up in chair  [ ] Patient left in no apparent distress in bed  [X] Call bell left within reach  [X] Nursing notified  [ ] Caregiver present  [ ] Bed alarm activated      COMMUNICATION/COLLABORATION:   The patients plan of care was discussed with: Physical Therapist     Vu Garnett, PT   Time Calculation: 30 mins

## 2017-05-21 LAB
ALBUMIN SERPL BCP-MCNC: 2.7 G/DL (ref 3.5–5)
ANION GAP BLD CALC-SCNC: 11 MMOL/L (ref 5–15)
BUN SERPL-MCNC: 30 MG/DL (ref 6–20)
BUN/CREAT SERPL: 16 (ref 12–20)
CALCIUM SERPL-MCNC: 8.3 MG/DL (ref 8.5–10.1)
CHLORIDE SERPL-SCNC: 103 MMOL/L (ref 97–108)
CO2 SERPL-SCNC: 24 MMOL/L (ref 21–32)
CREAT SERPL-MCNC: 1.93 MG/DL (ref 0.7–1.3)
GLUCOSE BLD STRIP.AUTO-MCNC: 124 MG/DL (ref 65–100)
GLUCOSE BLD STRIP.AUTO-MCNC: 125 MG/DL (ref 65–100)
GLUCOSE BLD STRIP.AUTO-MCNC: 161 MG/DL (ref 65–100)
GLUCOSE BLD STRIP.AUTO-MCNC: 244 MG/DL (ref 65–100)
GLUCOSE SERPL-MCNC: 131 MG/DL (ref 65–100)
INR PPP: 2.6 (ref 0.9–1.1)
PHOSPHATE SERPL-MCNC: 2.9 MG/DL (ref 2.6–4.7)
POTASSIUM SERPL-SCNC: 3.6 MMOL/L (ref 3.5–5.1)
PROTHROMBIN TIME: 26.7 SEC (ref 9–11.1)
SERVICE CMNT-IMP: ABNORMAL
SODIUM SERPL-SCNC: 138 MMOL/L (ref 136–145)

## 2017-05-21 PROCEDURE — 97530 THERAPEUTIC ACTIVITIES: CPT

## 2017-05-21 PROCEDURE — P9045 ALBUMIN (HUMAN), 5%, 250 ML: HCPCS | Performed by: INTERNAL MEDICINE

## 2017-05-21 PROCEDURE — 74011250636 HC RX REV CODE- 250/636: Performed by: INTERNAL MEDICINE

## 2017-05-21 PROCEDURE — 74011250637 HC RX REV CODE- 250/637: Performed by: NURSE PRACTITIONER

## 2017-05-21 PROCEDURE — 82962 GLUCOSE BLOOD TEST: CPT

## 2017-05-21 PROCEDURE — 74011250637 HC RX REV CODE- 250/637: Performed by: PHYSICIAN ASSISTANT

## 2017-05-21 PROCEDURE — 80069 RENAL FUNCTION PANEL: CPT | Performed by: INTERNAL MEDICINE

## 2017-05-21 PROCEDURE — 85610 PROTHROMBIN TIME: CPT | Performed by: INTERNAL MEDICINE

## 2017-05-21 PROCEDURE — 74011250637 HC RX REV CODE- 250/637: Performed by: ORTHOPAEDIC SURGERY

## 2017-05-21 PROCEDURE — 65270000029 HC RM PRIVATE

## 2017-05-21 PROCEDURE — 74011636637 HC RX REV CODE- 636/637: Performed by: HOSPITALIST

## 2017-05-21 PROCEDURE — 36415 COLL VENOUS BLD VENIPUNCTURE: CPT | Performed by: INTERNAL MEDICINE

## 2017-05-21 PROCEDURE — 97116 GAIT TRAINING THERAPY: CPT

## 2017-05-21 RX ORDER — WARFARIN 4 MG/1
4 TABLET ORAL ONCE
Status: COMPLETED | OUTPATIENT
Start: 2017-05-21 | End: 2017-05-21

## 2017-05-21 RX ADMIN — ACETAMINOPHEN 650 MG: 325 TABLET, FILM COATED ORAL at 12:25

## 2017-05-21 RX ADMIN — RIFAXIMIN 550 MG: 550 TABLET ORAL at 21:03

## 2017-05-21 RX ADMIN — URSODIOL 300 MG: 300 CAPSULE ORAL at 09:14

## 2017-05-21 RX ADMIN — METOPROLOL TARTRATE 25 MG: 25 TABLET ORAL at 21:04

## 2017-05-21 RX ADMIN — Medication 325 MG: at 21:03

## 2017-05-21 RX ADMIN — ALBUMIN (HUMAN) 25 G: 12.5 INJECTION, SOLUTION INTRAVENOUS at 20:06

## 2017-05-21 RX ADMIN — INSULIN LISPRO 2 UNITS: 100 INJECTION, SOLUTION INTRAVENOUS; SUBCUTANEOUS at 12:27

## 2017-05-21 RX ADMIN — ACETAMINOPHEN 650 MG: 325 TABLET, FILM COATED ORAL at 07:00

## 2017-05-21 RX ADMIN — WARFARIN SODIUM 4 MG: 4 TABLET ORAL at 12:25

## 2017-05-21 RX ADMIN — PANTOPRAZOLE SODIUM 40 MG: 40 TABLET, DELAYED RELEASE ORAL at 09:14

## 2017-05-21 RX ADMIN — RIFAXIMIN 550 MG: 550 TABLET ORAL at 15:32

## 2017-05-21 RX ADMIN — ACETAMINOPHEN 650 MG: 325 TABLET, FILM COATED ORAL at 20:06

## 2017-05-21 RX ADMIN — ALBUMIN (HUMAN) 25 G: 12.5 INJECTION, SOLUTION INTRAVENOUS at 09:01

## 2017-05-21 RX ADMIN — INSULIN LISPRO 2 UNITS: 100 INJECTION, SOLUTION INTRAVENOUS; SUBCUTANEOUS at 21:44

## 2017-05-21 RX ADMIN — SIMVASTATIN 10 MG: 10 TABLET, FILM COATED ORAL at 21:04

## 2017-05-21 RX ADMIN — RIFAXIMIN 550 MG: 550 TABLET ORAL at 09:15

## 2017-05-21 RX ADMIN — ACETAMINOPHEN 650 MG: 325 TABLET, FILM COATED ORAL at 23:25

## 2017-05-21 RX ADMIN — DILTIAZEM HYDROCHLORIDE 120 MG: 120 CAPSULE, EXTENDED RELEASE ORAL at 09:13

## 2017-05-21 RX ADMIN — Medication 10 ML: at 08:11

## 2017-05-21 RX ADMIN — Medication 10 ML: at 23:26

## 2017-05-21 RX ADMIN — FUROSEMIDE 80 MG: 10 INJECTION, SOLUTION INTRAMUSCULAR; INTRAVENOUS at 20:05

## 2017-05-21 RX ADMIN — ALLOPURINOL 100 MG: 100 TABLET ORAL at 09:14

## 2017-05-21 RX ADMIN — Medication 10 ML: at 15:33

## 2017-05-21 RX ADMIN — URSODIOL 300 MG: 300 CAPSULE ORAL at 20:05

## 2017-05-21 RX ADMIN — FUROSEMIDE 80 MG: 10 INJECTION, SOLUTION INTRAMUSCULAR; INTRAVENOUS at 09:15

## 2017-05-21 NOTE — PROGRESS NOTES
Assessemnt/Plan:   Daily Progress Note: 2017    Admit date: 2017  6:42 AM    Hospitalist Progress Note   [phone]; Call physician on-call through the  7pm-7am          PCP: Sonia Willard MD   In Hospital Procedure:   Procedure(s):  REMOVAL OF PROSTALAC SPACER, RIGHT TOTAL [address]  Consultants this Hospitalization:   IP CONSULT TO HOSPITALIST  IP CONSULT TO [address] [place] [address] Procedure:   Procedure(s):  REMOVAL OF PROSTALAC SPACER, RIGHT TOTAL KNEE REVISION                 NAME:  Judythe Lombard      :  1963  MRN:  [id number]    Admission Summary:   Judythe Lombard is a 60 y.o. male who presents with declining renal function. Onset of symptoms was gradual with unchanged course since that time. Patient is currently admitted to the Orthopedic service and is postop post revision right total knee. We are asked to see the patient in consult to assist in managing HERBIE.        Interval history / Subjective:   2017 : no new issues, feels ok with current rx, believes that the swelling is a bit better day over day,         Assessment & Plan:      HERBIE on CKD  -creatinine improving, IVF discontinued on 5/15  -on blood transfusion  -diuretics, toradol and NSAID stopped  -nephrologist on board  - Cr moving in correct direction. Lab Results   Component Value Date/Time    Creatinine 1.93 2017 05:21 AM       Cont to monitor.  Is improved.      Hx of DM-II  -finger stick glucose 117-194/24 hrs  -continue sliding scale  -he said he doesn't take insulin at home  -A1c 5.7  -continue diet control  Lab Results   Component Value Date/Time    Glucose 131 2017 05:21 AM    Glucose (POC) 161 2017 11:26 AM    no adjustment 2017      HTN  -on diltiazem and metoprolol, monitor BP  - good control   BP Readings from Last 1 Encounters:   17 113/58         Leukopenia   -possible due to meds, improving  -monitor cbc  Lab Results Component Value Date/Time    WBC 4.6 05/16/2017 06:35 AM    f/u lab in am as to WBC     CAMPOVERDE Cirrhosis  -on lactulose and rfaximin  -stable liver function  - no confusion    Low albumin with edema, on lasix 80 mg bid . Albumin added. 5/19/2017      Anemia multifactorial  -on ferrous sulfate  -no evidence of active bleeding  -H/H trending down, 2 units PRBC on 5/15, monitor H/H  Hgb up to 9 range 5/16   Lab Results   Component Value Date/Time    Hemoglobin (POC) 8.0 10/31/2016 01:37 PM    HGB 9.0 05/19/2017 03:38 AM            Chronic thrombocytopenia likely due to liver cirrhosis  -improving, monitor platelet  -patient on coumadin, watch for bleeding  - stable at 80 to 90 range   Lab Results   Component Value Date/Time    PLATELET 94 56/31/2263 06:35 AM          Hx of atrial tachycardia  -rate normal, on metoprolol and diltiazem  Pulse Readings from Last 1 Encounters:   05/21/17 73         Hx of gout  -continue on allupurinol, and ursodil     S/p removal of hardware and revision of right total knee replacement  -on vancomycin  -management per orthopedic surgeon        Code status: Full Code  DVT prophylaxis: coumadin      Care Plan discussed with: Patient/Family, Nurse and         Subjective:     ROS    No sob, no cp, no n/v/d/f, cont w scrotal and general edema- but better. No pain issues. Day over day      Could NOT obtain due to:       Objective:     VITALS:   Last 24hrs VS reviewed since prior progress note.  Most recent are:  Patient Vitals for the past 24 hrs:   Temp Pulse Resp BP SpO2   05/21/17 1448 99.5 °F (37.5 °C) 73 17 113/58 96 %   05/21/17 0906 99.5 °F (37.5 °C) 79 18 115/56 95 %   05/21/17 0400 99.4 °F (37.4 °C) 79 18 113/71 98 %   05/20/17 2149 (!) 100.8 °F (38.2 °C) 86 20 127/60 96 %       Intake/Output Summary (Last 24 hours) at 05/21/17 1531  Last data filed at 05/21/17 1128   Gross per 24 hour   Intake              240 ml   Output             2400 ml   Net            -2160 ml PHYSICAL EXAM:  Visit Vitals    /58 (BP 1 Location: Left arm, BP Patient Position: Post activity)    Pulse 73    Temp 99.5 °F (37.5 °C)    Resp 17    Ht 6' 1\" (1.854 m)    Wt 127 kg (280 lb)    SpO2 96%    BMI 36.94 kg/m2               General:  Alert, cooperative, no distress, appears stated age. Head:  Normocephalic, without obvious abnormality, atraumatic. Eyes:     PERRL, EOMs intact. Ears:  Normal   external ears. Nose: Nares normal. Septum midline. Mucosa normal. No drainage or sinus tenderness. Throat: Lips, mucosa, moist.  .   Neck: Supple, symmetrical, trachea midline, no adenopathy, thyroid: no enlargement/tenderness/nodules, no carotid bruit and no JVD. Back:   Symmetric,    Lungs:   Clear to auscultation bilaterally. Chest wall:  No tenderness     Heart:  Regular rate and rhythm,     Abdomen:   C/c ascites.    SCRUTUM/: Scrotal edema ++ but better        Skin: Skin color, texture, turgor normal. No rashes or lesions   Lymph nodes: Cervical, supraclavicular, and axillary nodes normal.   Neurologic: Non focal                     Reviewed most current radiology test results   y  Review and summation of old records today   y  Reviewed patient's current orders and MAR   y  PMH/ reviewed - no change compared to H&P  ______________________________________________________________________  Medicines:    Current Facility-Administered Medications:     albumin human 5% (BUMINATE) solution 25 g, 25 g, IntraVENous, BID, Miguel Thompson MD, Last Rate: 250 mL/hr at 05/21/17 0901, 25 g at 05/21/17 0901    oxyCODONE IR (ROXICODONE) tablet 2.5 mg, 2.5 mg, Oral, Q6H PRN, Maury Jang NP    furosemide (LASIX) injection 80 mg, 80 mg, IntraVENous, BID, Beverly Voss MD, 80 mg at 05/21/17 0915    dextrose 10 % infusion 125-250 mL, 125-250 mL, IntraVENous, PRN, Kayleen Woodruff MD    0.9% sodium chloride infusion 250 mL, 250 mL, IntraVENous, PRN, Ronal Hadley MD    insulin lispro (HUMALOG) injection, , SubCUTAneous, AC&HS, Patsy Gould MD, 2 Units at 05/21/17 1227    glucose chewable tablet 16 g, 4 Tab, Oral, PRN, Patsy Gould MD    glucagon (GLUCAGEN) injection 1 mg, 1 mg, IntraMUSCular, PRN, Patsy Gould MD    0.9% sodium chloride infusion 250 mL, 250 mL, IntraVENous, PRN, Greg Pérez MD    simvastatin (ZOCOR) tablet 10 mg, 10 mg, Oral, QHS, John Cisneros, NP, 10 mg at 05/20/17 2225    sodium chloride (NS) flush 5-10 mL, 5-10 mL, IntraVENous, Q8H, DARION Livingston-C, 10 mL at 05/21/17 3930    sodium chloride (NS) flush 5-10 mL, 5-10 mL, IntraVENous, PRN, Boni Bowers PA-C    acetaminophen (TYLENOL) tablet 650 mg, 650 mg, Oral, Q6H, DARION Livingston-C, 650 mg at 05/21/17 1225    acetaminophen (TYLENOL) tablet 650 mg, 650 mg, Oral, Q6H PRN, Boni Bowers PA-C, 650 mg at 05/20/17 2224    naloxone [facility]) injection 0.4 mg, 0.4 mg, IntraVENous, PRN, Boni Bowers PA-C    hydrOXYzine HCl (ATARAX) tablet 10 mg, 10 mg, Oral, Q8H PRN, TRACEY LivingstonC    bisacodyl (DULCOLAX) suppository 10 mg, 10 mg, Rectal, DAILY PRN, TRACEY LivingstonC    metoprolol tartrate (LOPRESSOR) tablet 25 mg, 25 mg, Oral, QHS, DARION Livingston-C, 25 mg at 05/20/17 2225    ursodiol (ACTIGALL) capsule 300 mg, 300 mg, Oral, BID, DARION Livingston-C, 300 mg at 05/21/17 5213    dilTIAZem CD (CARDIZEM CD) capsule 120 mg, 120 mg, Oral, DAILY, DARION Livingston-C, 120 mg at 05/21/17 1757    ferrous sulfate tablet 325 mg, 325 mg, Oral, QHS, Boni Bowers PA-C, 325 mg at 05/20/17 2225    lactulose (CHRONULAC) solution 10 g, 10 g, Oral, TID, DARION Livingston-C, 10 g at 05/19/17 2655    rifAXIMin (XIFAXAN) tablet 550 mg, 550 mg, Oral, TID, DARION Livingston-C, 550 mg at 05/21/17 0915    allopurinol (ZYLOPRIM) tablet 100 mg, 100 mg, Oral, DAILY, DARION Livingston-C, 100 mg at 05/21/17 0914    pantoprazole (PROTONIX) tablet 40 mg, 40 mg, Oral, ACB, Gilles Pac, PA-C, 40 mg at 05/21/17 6293    Warfarin- pharmacy to dose, , Other, Rx Dosing/Monitoring, Fausto Hampton MD  Procedures: see electronic medical records for all procedures/Xrays and details which were not copied into this note but were reviewed prior to creation of Plan. LABS:  Recent Labs      05/19/17 0338   HGB  9.0*     Recent Labs      05/21/17 0521 05/20/17 0339 05/19/17 0338   NA  138  140  140   K  3.6  3.4*  3.8   CL  103  105  108   CO2  24  26  25   BUN  30*  34*  36*   CREA  1.93*  1.95*  2.05*   GLU  131*  117*  117*   CA  8.3*  8.3*  8.7   MG   --   1.5*  1.6   PHOS  2.9  3.3  3.6     Recent Labs      05/21/17 0521 05/20/17 0339 05/19/17 0338   ALB  2.7*  2.3*  2.1*     Recent Labs      05/21/17 0521 05/20/17 0339 05/19/17 0338   INR  2.6*  2.6*  2.4*   PTP  26.7*  26.1*  24.5*      No results for input(s): FE, TIBC, PSAT, FERR in the last 72 hours. Lab Results   Component Value Date/Time    Folate 6.6 11/03/2016 02:46 AM      No results for input(s): PH, PCO2, PO2 in the last 72 hours. No results for input(s): PHI, PO2I, PCO2I in the last 72 hours. No results for input(s): CPK, CKNDX, TROIQ in the last 72 hours.     No lab exists for component: CPKMB  No results found for: CHOL, CHOLX, CHLST, CHOLV, HDL, LDL, DLDL, LDLC, DLDLP, TGL, TGLX, TRIGL, TRIGP, CHHD, CHHDX  Lab Results   Component Value Date/Time    Glucose (POC) 161 05/21/2017 11:26 AM    Glucose (POC) 124 05/21/2017 06:28 AM    Glucose (POC) 186 05/20/2017 09:38 PM    Glucose (POC) 182 05/20/2017 04:47 PM    Glucose (POC) 136 05/20/2017 11:18 AM     Lab Results   Component Value Date/Time    Color YELLOW/STRAW 02/13/2017 03:52 PM    Appearance CLEAR 02/13/2017 03:52 PM    Specific gravity 1.019 02/13/2017 03:52 PM    pH (UA) 5.0 02/13/2017 03:52 PM    Protein NEGATIVE  02/13/2017 03:52 PM    Glucose NEGATIVE  02/13/2017 03:52 PM    Ketone NEGATIVE  02/13/2017 03:52 PM    Bilirubin NEGATIVE  02/13/2017 03:52 PM    Urobilinogen 1.0 02/13/2017 03:52 PM    Nitrites NEGATIVE  02/13/2017 03:52 PM    Leukocyte Esterase NEGATIVE  02/13/2017 03:52 PM    Epithelial cells FEW 02/13/2017 03:52 PM    Bacteria NEGATIVE  02/13/2017 03:52 PM    WBC 0-4 02/13/2017 03:52 PM    RBC 0-5 02/13/2017 03:52 PM           CULTURES:    Lab Results   Component Value Date/Time    Specimen Description: URINE 03/15/2012 11:55 AM    Lab Results   Component Value Date/Time    Culture result: MRSA NOT PRESENT 02/13/2017 03:15 PM    Culture result:  02/13/2017 03:15 PM         Screening of patient nares for MRSA is for surveillance purposes and, if positive, to facilitate isolation considerations in high risk settings. It is not intended for automatic decolonization interventions per se as regimens are not sufficiently effective to warrant routine use. Culture result: NO GROWTH ON SOLID MEDIA AT 14 DAYS 11/10/2016 04:05 PM    Culture result:  11/10/2016 04:05 PM     STAPHYLOCOCCUS EPIDERMIDIS  ISOLATED FROM THIO BROTH ONLY      Culture result:  11/10/2016 04:05 PM     PRELIMINARY RESULT OF GRAM POSITIVE COCCI IN CLUSTERS  SEEN ON GRAM STAIN OF THE THIO BROTH  CALLED TO AND READ BACK BY  Annemarie Menard AT 1054, 11/13/16 DB      Culture result: (THERE IS NO AEROBIC CULTURE ORDER) 11/10/2016 04:05 PM        CT Results (most recent):    Results from [place] encounter on 10/31/16   CT LOW EXT RT WO CONT   Narrative INDICATION:  post-op anemia, eval for hematoma     EXAM: CT right lower extremity. No comparisons. Thin section axial images were obtained. From these sagittal and coronal  reformats were performed. CT dose reduction was achieved through use of a  standardized protocol tailored for this examination and automatic exposure  control for dose modulation. FINDINGS: Patient is post cemented total knee arthroplasty on the right with a  large radiolucent spacer. There is a moderate complex joint effusion. There is  no soft tissue hematoma demonstrated. Mild edema is noted in the subcutaneous tissues slightly asymmetric to the left. There are vascular calcifications. There are severe atrophy of the right  peroneus brevis and longus muscle bodies. Gas within the soft tissues is not  unexpected post recent surgery. There is diverticulosis of the sigmoid colon. No dilated loops of bowel are  noted within the pelvis         Impression IMPRESSION:  1.  No soft tissue hematoma post right total knee arthroplasty          Miguel Thompson MD

## 2017-05-21 NOTE — PROGRESS NOTES
Pharmacist Note  Warfarin Dosing  Consult provided for this 60 y.o. male to manage warfarin for Orthopedic Surgery (VTE prophylaxis), hx of Afib    INR Goal: 2 - 3  Home regimen: 5 mg PO daily    Drugs that may increase INR: Allopurinol  Drugs that may decrease INR: None  Other current anticoagulants/ drugs that may increase bleeding risk: None  Risk factors: None  Daily INR ordered: YES    Recent Labs      05/21/17   0521  05/20/17   0339  05/19/17   0338   HGB   --    --   9.0*   INR  2.6*  2.6*  2.4*     Date               INR                  Dose  5/12                1.3                   5 mg  5/13                1.4                   5 mg  5/14                1.7                   5 mg  5/15  1.7  Held (received PRBCs)  5/16                1.6                   7.5 mg  5/17                1.7                   7.5 mg  5/18  2.0  5 mg  5/19  2.4  5 mg  5/20                 2.6                   4 mg   5/21                 2.6                   4 mg                                                                               Assessment/ Plan: Will order warfarin 4 mg PO x 1 dose. Pharmacy will continue to monitor daily and adjust therapy as indicated. Please contact the pharmacist at  for outpatient recommendations if needed.

## 2017-05-21 NOTE — PROGRESS NOTES
Problem: Mobility Impaired (Adult and Pediatric)  Goal: *Acute Goals and Plan of Care (Insert Text)  Physical Therapy Goals  Initiated 5/13/2017    1. Patient will move from supine to sit and sit to supine and scoot up and down in bed with modified independence within 4 days. 2. Patient will perform sit to stand with minimal assistance/contact guard assist within 4 days. 3. Patient will ambulate with modified independence for 300 feet with the least restrictive device within 4 days. 4. Patient will ascend/descend 5 stairs with 1 handrail(s) with modified independence within 4 days. 5. Patient will perform home exercise program per protocol with independence within 4 days. 6. Patient will demonstrate AROM 0-90 degrees in operative joint within 4 days. PHYSICAL THERAPY TREATMENT  Patient: Thomas Orr III (37 y.o. 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 ASSESSMENT:  Pt sleeping heavily on arrival but able to be aroused. Pt agreeable to gait. Pt continues to utilize the scrotal support due to Lower body edema. Pt required min A for LE with bed mobility and then CGA for gait. No LOB with upright mobility. Will continue to progress. Progression toward goals:  [X]      Improving appropriately and progressing toward goals  [ ]      Improving slowly and progressing toward goals  [ ]      Not making progress toward goals and plan of care will be adjusted       PLAN:  Patient continues to benefit from skilled intervention to address the above impairments. Continue treatment per established plan of care.   Discharge Recommendations:  Home Health  Further Equipment Recommendations for Discharge:  none       SUBJECTIVE:   Patient stated I was sleeping so well.       OBJECTIVE DATA SUMMARY:   Critical Behavior:  Neurologic State: Alert  Orientation Level: Oriented X4  Cognition: Appropriate for age attention/concentration  Safety/Judgement: Good awareness of safety precautions  Functional Mobility Training:  Bed Mobility:     Supine to Sit: Minimum assistance (right LE)  Sit to Supine: Minimum assistance (Right LE)                       Transfers:  Sit to Stand: Contact guard assistance (elevated bed)  Stand to Sit: Contact guard assistance                             Balance:  Sitting: Intact  Standing: Intact; With support  Ambulation/Gait Training:  Distance (ft): 300 Feet (ft)  Assistive Device: Brace/Splint;Gait belt;Walker, rolling  Ambulation - Level of Assistance: Contact guard assistance        Gait Abnormalities: Antalgic;Decreased step clearance        Base of Support: Widened     Speed/Renee: Pace decreased (<100 feet/min); Slow  Step Length: Left shortened;Right shortened                               Stairs:              Neuro Re-Education:     Therapeutic Exercises:      Pain:  Pain Scale 1: Numeric (0 - 10)  Pain Intensity 1: 0              Activity Tolerance:   Limited   Please refer to the flowsheet for vital signs taken during this treatment.   After treatment:   [ ] Patient left in no apparent distress sitting up in chair  [X] Patient left in no apparent distress in bed  [X] Call bell left within reach  [X] Nursing notified  [ ] Caregiver present  [ ] Bed alarm activated      COMMUNICATION/COLLABORATION:   The patients plan of care was discussed with: Registered Nurse     Triston Watts PTA   Time Calculation: 26 mins

## 2017-05-21 NOTE — PROGRESS NOTES
Orthopedic Joint Progress Note    May 21, 2017  Admit Date: 2017  Admit Diagnosis: STATUS POST RIGHT TOTAL KNEE REPLACEMENT  Failed total right knee replacement, sequela    9 Days Post-Op    Subjective:     Summer Wade III no pain today    Review of Systems: Pertinent items are noted in HPI. Objective:     PT/OT:     PATIENT MOBILITY    Bed Mobility  Rolling: Minimum assistance  Supine to Sit: Minimum assistance  Sit to Supine: Minimum assistance, Additional time, Assist x1  Scooting: Modified independent  Transfers  Sit to Stand: Other (comment), Minimum assistance (elevated bed)  Stand to Sit: Minimum assistance  Bed to Chair:  (unable to progress with standing)      Gait  Base of Support: Widened  Speed/Renee: Slow  Step Length: Left shortened, Right shortened  Gait Abnormalities: Antalgic  Ambulation - Level of Assistance: Contact guard assistance  Distance (ft): 120 Feet (ft)  Assistive Device: Walker, rolling, Gait belt  Rail Use: Right   Stairs - Level of Assistance: Contact guard assistance, Additional time, Assist X2  Number of Stairs Trained: 4  Interventions:  (verbal cues to encourage side stepping)            Vital Signs:    Blood pressure 115/56, pulse 79, temperature 99.5 °F (37.5 °C), resp. rate 18, height 6' 1\" (1.854 m), weight 127 kg (280 lb), SpO2 95 %.   Temp (24hrs), Av.3 °F (37.4 °C), Min:98.2 °F (36.8 °C), Max:100.8 °F (38.2 °C)      Pain Control:   Pain Assessment  Pain Scale 1: Numeric (0 - 10)  Pain Intensity 1: 0  Pain Location 1: Knee  Pain Orientation 1: Right  Pain Description 1: Aching  Pain Intervention(s) 1: Ice, Medication (see MAR)    Meds:  Current Facility-Administered Medications   Medication Dose Route Frequency    warfarin (COUMADIN) tablet 4 mg  4 mg Oral ONCE    albumin human 5% (BUMINATE) solution 25 g  25 g IntraVENous BID    oxyCODONE IR (ROXICODONE) tablet 2.5 mg  2.5 mg Oral Q6H PRN    furosemide (LASIX) injection 80 mg  80 mg IntraVENous BID    dextrose 10 % infusion 125-250 mL  125-250 mL IntraVENous PRN    0.9% sodium chloride infusion 250 mL  250 mL IntraVENous PRN    insulin lispro (HUMALOG) injection   SubCUTAneous AC&HS    glucose chewable tablet 16 g  4 Tab Oral PRN    glucagon (GLUCAGEN) injection 1 mg  1 mg IntraMUSCular PRN    0.9% sodium chloride infusion 250 mL  250 mL IntraVENous PRN    simvastatin (ZOCOR) tablet 10 mg  10 mg Oral QHS    sodium chloride (NS) flush 5-10 mL  5-10 mL IntraVENous Q8H    sodium chloride (NS) flush 5-10 mL  5-10 mL IntraVENous PRN    acetaminophen (TYLENOL) tablet 650 mg  650 mg Oral Q6H    acetaminophen (TYLENOL) tablet 650 mg  650 mg Oral Q6H PRN    naloxone (NARCAN) injection 0.4 mg  0.4 mg IntraVENous PRN    hydrOXYzine HCl (ATARAX) tablet 10 mg  10 mg Oral Q8H PRN    bisacodyl (DULCOLAX) suppository 10 mg  10 mg Rectal DAILY PRN    metoprolol tartrate (LOPRESSOR) tablet 25 mg  25 mg Oral QHS    ursodiol (ACTIGALL) capsule 300 mg  300 mg Oral BID    dilTIAZem CD (CARDIZEM CD) capsule 120 mg  120 mg Oral DAILY    ferrous sulfate tablet 325 mg  325 mg Oral QHS    lactulose (CHRONULAC) solution 10 g  10 g Oral TID    rifAXIMin (XIFAXAN) tablet 550 mg  550 mg Oral TID    allopurinol (ZYLOPRIM) tablet 100 mg  100 mg Oral DAILY    pantoprazole (PROTONIX) tablet 40 mg  40 mg Oral ACB    Warfarin- pharmacy to dose   Other Rx Dosing/Monitoring        LAB:    Lab Results   Component Value Date/Time    INR 2.6 05/21/2017 05:21 AM    INR 2.6 05/20/2017 03:39 AM    INR 2.4 05/19/2017 03:38 AM     Lab Results   Component Value Date/Time    HGB 9.0 05/19/2017 03:38 AM    HGB 8.9 05/18/2017 03:02 AM    HGB 9.0 05/16/2017 06:35 AM       Wound Knee Right (Active)   DRESSING STATUS Clean, dry, and intact 5/20/2017  2:04 PM   DRESSING TYPE Silver products 5/20/2017  2:04 PM   SPLINT TYPE/MATERIAL Knee immobilizer 5/20/2017  2:04 PM   Incision site well approximated?  Yes 5/17/2017  4:00 PM   Drainage Amount None 5/20/2017 10:00 AM   Drainage Color Serosanguinous 5/17/2017  8:30 PM   Wound Odor None 5/20/2017 10:00 AM   Periwound Skin Condition Intact 5/18/2017  3:44 PM   Number of days:9       [REMOVED] Wound Knee Anterior (Removed)   Removed 05/17/17 0013   Number of days:1864       [REMOVED] Wound Knee Right (Removed)   Removed 05/17/17 0013   Number of days:198         Physical Exam:  No significant changes    Assessment:      Principal Problem:    Failed total right knee replacement ([address] (5/11/2017)      Overview: REMOVAL OF HARDWARE & REVISION RIGHT TOTAL KNEE REPLACEMENT 5/12/17         Plan:     Continue PT/OT/Rehab  Consult: Rehab team including PT, OT, recreational therapy, and     Patient Expects to be Discharged to[de-identified] Private residence     Signed By: Lester Rico MD

## 2017-05-22 ENCOUNTER — HOME HEALTH ADMISSION (OUTPATIENT)
Dept: HOME HEALTH SERVICES | Facility: HOME HEALTH | Age: 62
End: 2017-05-22
Payer: COMMERCIAL

## 2017-05-22 VITALS
BODY MASS INDEX: 37.11 KG/M2 | HEART RATE: 72 BPM | WEIGHT: 280 LBS | TEMPERATURE: 98.8 F | DIASTOLIC BLOOD PRESSURE: 52 MMHG | RESPIRATION RATE: 16 BRPM | OXYGEN SATURATION: 97 % | SYSTOLIC BLOOD PRESSURE: 113 MMHG | HEIGHT: 73 IN

## 2017-05-22 LAB
ALBUMIN SERPL BCP-MCNC: 2.9 G/DL (ref 3.5–5)
ANION GAP BLD CALC-SCNC: 8 MMOL/L (ref 5–15)
BUN SERPL-MCNC: 30 MG/DL (ref 6–20)
BUN/CREAT SERPL: 15 (ref 12–20)
CALCIUM SERPL-MCNC: 8.3 MG/DL (ref 8.5–10.1)
CHLORIDE SERPL-SCNC: 101 MMOL/L (ref 97–108)
CO2 SERPL-SCNC: 26 MMOL/L (ref 21–32)
CREAT SERPL-MCNC: 2.03 MG/DL (ref 0.7–1.3)
ERYTHROCYTE [DISTWIDTH] IN BLOOD BY AUTOMATED COUNT: 19.7 % (ref 11.5–14.5)
GLUCOSE BLD STRIP.AUTO-MCNC: 114 MG/DL (ref 65–100)
GLUCOSE BLD STRIP.AUTO-MCNC: 127 MG/DL (ref 65–100)
GLUCOSE BLD STRIP.AUTO-MCNC: 130 MG/DL (ref 65–100)
GLUCOSE SERPL-MCNC: 104 MG/DL (ref 65–100)
HCT VFR BLD AUTO: 26.9 % (ref 36.6–50.3)
HGB BLD-MCNC: 8.9 G/DL (ref 12.1–17)
INR PPP: 2.7 (ref 0.9–1.1)
MCH RBC QN AUTO: 27.8 PG (ref 26–34)
MCHC RBC AUTO-ENTMCNC: 33.1 G/DL (ref 30–36.5)
MCV RBC AUTO: 84.1 FL (ref 80–99)
PHOSPHATE SERPL-MCNC: 3.2 MG/DL (ref 2.6–4.7)
PLATELET # BLD AUTO: 77 K/UL (ref 150–400)
POTASSIUM SERPL-SCNC: 3.6 MMOL/L (ref 3.5–5.1)
PROTHROMBIN TIME: 28.4 SEC (ref 9–11.1)
RBC # BLD AUTO: 3.2 M/UL (ref 4.1–5.7)
SERVICE CMNT-IMP: ABNORMAL
SODIUM SERPL-SCNC: 135 MMOL/L (ref 136–145)
WBC # BLD AUTO: 7.2 K/UL (ref 4.1–11.1)

## 2017-05-22 PROCEDURE — 74011250637 HC RX REV CODE- 250/637: Performed by: PHYSICIAN ASSISTANT

## 2017-05-22 PROCEDURE — 80069 RENAL FUNCTION PANEL: CPT | Performed by: ORTHOPAEDIC SURGERY

## 2017-05-22 PROCEDURE — 74011250636 HC RX REV CODE- 250/636: Performed by: INTERNAL MEDICINE

## 2017-05-22 PROCEDURE — 82962 GLUCOSE BLOOD TEST: CPT

## 2017-05-22 PROCEDURE — 85610 PROTHROMBIN TIME: CPT | Performed by: ORTHOPAEDIC SURGERY

## 2017-05-22 PROCEDURE — 85027 COMPLETE CBC AUTOMATED: CPT | Performed by: ORTHOPAEDIC SURGERY

## 2017-05-22 PROCEDURE — P9045 ALBUMIN (HUMAN), 5%, 250 ML: HCPCS | Performed by: INTERNAL MEDICINE

## 2017-05-22 PROCEDURE — 36415 COLL VENOUS BLD VENIPUNCTURE: CPT | Performed by: ORTHOPAEDIC SURGERY

## 2017-05-22 PROCEDURE — 77030012894

## 2017-05-22 PROCEDURE — 74011250637 HC RX REV CODE- 250/637: Performed by: NURSE PRACTITIONER

## 2017-05-22 RX ORDER — OXYCODONE HYDROCHLORIDE 5 MG/1
5-10 TABLET ORAL
Qty: 90 TAB | Refills: 0 | Status: SHIPPED | OUTPATIENT
Start: 2017-05-22 | End: 2019-10-24

## 2017-05-22 RX ORDER — SIMVASTATIN 10 MG/1
10 TABLET, FILM COATED ORAL
Qty: 30 TAB | Refills: 0 | Status: SHIPPED | OUTPATIENT
Start: 2017-05-22 | End: 2017-11-26 | Stop reason: SDUPTHER

## 2017-05-22 RX ORDER — FUROSEMIDE 40 MG/1
40 TABLET ORAL 2 TIMES DAILY
Status: DISCONTINUED | OUTPATIENT
Start: 2017-05-22 | End: 2017-05-22 | Stop reason: HOSPADM

## 2017-05-22 RX ORDER — WARFARIN SODIUM 5 MG/1
TABLET ORAL
Qty: 30 TAB | Refills: 0 | Status: SHIPPED | OUTPATIENT
Start: 2017-05-22

## 2017-05-22 RX ORDER — FUROSEMIDE 40 MG/1
TABLET ORAL
Qty: 60 TAB | Refills: 0 | Status: SHIPPED | OUTPATIENT
Start: 2017-05-22 | End: 2019-10-24

## 2017-05-22 RX ADMIN — ACETAMINOPHEN 650 MG: 325 TABLET, FILM COATED ORAL at 06:23

## 2017-05-22 RX ADMIN — RIFAXIMIN 550 MG: 550 TABLET ORAL at 08:43

## 2017-05-22 RX ADMIN — Medication 10 ML: at 07:23

## 2017-05-22 RX ADMIN — ACETAMINOPHEN 650 MG: 325 TABLET, FILM COATED ORAL at 12:36

## 2017-05-22 RX ADMIN — ALLOPURINOL 100 MG: 100 TABLET ORAL at 08:43

## 2017-05-22 RX ADMIN — ALBUMIN (HUMAN) 25 G: 12.5 INJECTION, SOLUTION INTRAVENOUS at 08:44

## 2017-05-22 RX ADMIN — URSODIOL 300 MG: 300 CAPSULE ORAL at 08:43

## 2017-05-22 RX ADMIN — Medication 10 ML: at 14:00

## 2017-05-22 RX ADMIN — DILTIAZEM HYDROCHLORIDE 120 MG: 120 CAPSULE, EXTENDED RELEASE ORAL at 08:43

## 2017-05-22 RX ADMIN — PANTOPRAZOLE SODIUM 40 MG: 40 TABLET, DELAYED RELEASE ORAL at 07:24

## 2017-05-22 RX ADMIN — FUROSEMIDE 80 MG: 10 INJECTION, SOLUTION INTRAMUSCULAR; INTRAVENOUS at 08:43

## 2017-05-22 RX ADMIN — OXYCODONE HYDROCHLORIDE 2.5 MG: 5 TABLET ORAL at 00:46

## 2017-05-22 NOTE — PROGRESS NOTES
Pharmacist Note  Warfarin Dosing  Consult provided for this 60 y.o. male to manage warfarin for h/o Afib (now s/p Orthopedic Surgery)  INR Goal: 2 - 3    Home regimen: 5 mg po daily    Drugs that may increase INR: Allopurinol, Simvastatin  Drugs that may decrease INR: None  Other current anticoagulants/ drugs that may increase bleeding risk: None  Risk factors: None  Daily INR ordered: YES  Recent Labs      05/22/17   0721  05/21/17   0521  05/20/17   0339   HGB  8.9*   --    --    INR  2.7*  2.6*  2.6*     Date               INR                  Dose  5/12                1.3                   5 mg  5/13                1.4                   5 mg  5/14                1.7                   5 mg  5/15  1.7  Held (received PRBCs)  5/16                1.6                   7.5 mg  5/17                1.7                   7.5 mg  5/18  2.0  5 mg  5/19  2.4  5 mg  5/20                 2.6                   4 mg   5/21                 2.6                   4 mg  5/22                 2.7          PA ordered held                                                                               Assessment/ Plan: Will hold warfarin today per PA order (note is not yet available, so reason for holding is not clear at this time). Per RN, plan is for discharge today. Recommend discharging on warfarin 4mg daily to maintain goal INR 2 to 3 for afib. Pharmacy will continue to monitor daily and adjust therapy as indicated.

## 2017-05-22 NOTE — PROGRESS NOTES
Bedside and Verbal shift change report given to Lily (oncoming nurse) by Rosa Coats (offgoing nurse). Report included the following information SBAR, Kardex, Intake/Output, MAR and Recent Results.

## 2017-05-22 NOTE — PROGRESS NOTES
CRM called [facility] office to inform of discharge today. Faxed the discharge orders to [phone].  SIGIFREDO

## 2017-05-22 NOTE — PROGRESS NOTES
Physical Therapy  Chart reviewed. Attempted to see pt, however he reports he is discharging home today and wishes to conserve his energy for getting home. He has been ambulating the unit with RW and supervision for safety. Pt reports he already attempted stairs, however the house he is discharging home to does not have any stairs. He declined any further ambulation today due to his discharge plans. Will f/u tomorrow if discharge is delayed for any reason. Thanks!   Ap Mcneal, PT

## 2017-05-22 NOTE — DISCHARGE SUMMARY
Discharge Summary       PATIENT ID: Jimenez Cooper  MRN: [id number]   YOB: 1955    DATE OF ADMISSION: 5/12/2017  6:42 AM    DATE OF DISCHARGE: 5/22/2017     PRIMARY CARE PROVIDER: Feliciano Camp MD     Discharging md ;PHYSICIAN: Kevin Arzola MD  And attending :  . Sav Patricio MD   DISCHARGING PROVIDER: Kevin Arzola MD    To contact this individual call [phone] and ask the  to page. If unavailable ask to be transferred the Adult Hospitalist Department. CONSULTATIONS: IP CONSULT TO HOSPITALIST  IP CONSULT TO NEPHROLOGY    PROCEDURES/SURGERIES: Procedure(s):  REMOVAL OF PROSTALAC SPACER, RIGHT TOTAL KNEE REVISION    ADMITTING DIAGNOSES & HOSPITAL COURSE:       Per Nephrology:     HERBIE on CKD:  - 2/2 toradol + ATN from hypotension  - Cr stable and close to baseline  - switch to oral diuretics today  - ok for d/c from renal standpoint  - would d/c on lasix 40mg po BID for now  - will need to follow up with Dr. Amira Bethea in 1 week after d/c     Hypokalemia  - replete as needed     CKD III:  - baseline Cr around 1.8  - likely from chronic HTN and DM2     Anemia:  - hgb stable  - s/p 2 units of blood on 5/15     Cirrhosis, CAMPOVERDE     DM2:  - per primary service     HTN:  - BP stable     Gout:  - cont allopurinol     Staph infection of right TKR, S/P removal 5/12/17         Interval History:  Seen and examined. No new issues.  Cr stable, UOP stable.      No cp, sob, n/v/d.     Current Medications: all current Medications have been eviewed in EPIC  Review of Systems: Pertinent items are noted in HPI.     Objective:  Vitals:           Vitals:     05/21/17 2101 05/22/17 0253 05/22/17 0650 05/22/17 0837   BP: 119/60 98/50 100/56 108/64   Pulse: 84 71 70 73   Resp: 16 16 16 16   Temp: 100.3 °F (37.9 °C) 98.5 °F (36.9 °C) 98.9 °F (37.2 °C) 98 °F (36.7 °C)   SpO2: 95% 95% 96% 97%   Weight:           Height:              Intake and Output:  05/22 0701 - 05/22 1900  In: -   Out: 600 [Urine:600]  1901 -  0700  In: 12 [P.O.:960]  Out: 4425 [Urine:4425]     Physical Examination:  General: NAD,Conversant   Neck:  Supple, no mass  Resp:  Lungs CTA B/L, no wheezing , normal respiratory effort  CV:  RRR,  no murmur or rub, 3+ b/l LE edema  GI:  Soft, NT, + Bowel sounds, no hepatosplenomegaly  Neurologic:  Non focal  Psych: AAO x 3 appropriate affect          PCP: Woodrow Pro MD   In Hospital Procedure:   Procedure(s):  REMOVAL OF PROSTALAC SPACER, RIGHT TOTAL KNEE REVISION  Consultants this Hospitalization:   IP CONSULT TO HOSPITALIST  IP CONSULT TO NEPHROLOGY     In Hospital Procedure:   Procedure(s):  REMOVAL OF PROSTALAC SPACER, RIGHT TOTAL KNEE REVISION                    NAME: Sumeet Hernadez III        : 1963  MRN: [id number]     Admission Summary:   Norma Becker is a 60 y.o. male who presents with declining renal function. Onset of symptoms was gradual with unchanged course since that time. Patient is currently admitted to the Orthopedic service and is postop post revision right total knee. We are asked to see the patient in consult to assist in managing HERBIE.         Interval history / Subjective:   2017 : no new issues, feels ok with current rx, believes that the swelling is a bit better. nt.        Assessment & Plan:       HERBIE on CKD  -creatinine improving, IVF discontinued on 5/15  -on blood transfusion  -diuretics, toradol and NSAID stopped  -nephrologist on board  - Cr moving in correct direction. Lab Results   Component Value Date/Time     Creatinine 1.95 2017 03:39 AM      Cont to monitor.  Is improved.       Hx of DM-II  -finger stick glucose 117-194/24 hrs  -continue sliding scale  -he said he doesn't take insulin at home  -A1c 5.7  -continue diet control        Lab Results   Component Value Date/Time     Glucose 117 2017 03:39 AM     Glucose (POC) 136 2017 11:18 AM    no adjustment 2017       HTN  -on diltiazem and metoprolol, monitor BP  - good control       BP Readings from Last 1 Encounters:   05/20/17 112/51          Leukopenia   -possible due to meds, improving  -monitor cbc        Lab Results   Component Value Date/Time     WBC 4.6 05/16/2017 06:35 AM          CAMPOVERDE Cirrhosis  -on lactulose and rfaximin  -stable liver function  - no confusion     Low albumin with edema, on lasix 80 mg bid . Albumin added. 5/19/2017       Anemia multifactorial  -on ferrous sulfate  -no evidence of active bleeding  -H/H trending down, 2 units PRBC on 5/15, monitor H/H  Hgb up to 9 range 5/16         Lab Results   Component Value Date/Time     Hemoglobin (POC) 8.0 10/31/2016 01:37 PM     HGB 9.0 05/19/2017 03:38 AM              Chronic thrombocytopenia likely due to liver cirrhosis  -improving, monitor platelet  -patient on coumadin, watch for bleeding  - stable at 80 to 90 range         Lab Results   Component Value Date/Time     PLATELET 94 76/05/7728 06:35 AM             Hx of atrial tachycardia  -rate normal, on metoprolol and diltiazem      Pulse Readings from Last 1 Encounters:   05/20/17 76          Hx of gout  -continue on allupurinol, and ursodil      S/p removal of hardware and revision of right total knee replacement  -on vancomycin  -management per orthopedic surgeon          Code status: Full Code  DVT prophylaxis: coumadin   [address] discussed with: Patient/Family, Nurse and           Subjective:      ROS     No sob, no cp, no n/v/d/f, cont w scrotal and general edema- but better. No pain issues.         Could NOT obtain due to:         Objective:      VITALS:   Last 24hrs VS reviewed since prior progress note.  Most recent are:  Patient Vitals for the past 24 hrs:    Temp Pulse Resp BP SpO2   05/20/17 0958 98.4 °F (36.9 °C) 76 16 112/51 98 %   05/20/17 0335 98.2 °F (36.8 °C) 68 16 104/53 95 %   05/20/17 0246 98.1 °F (36.7 °C) 91 14 94/46 92 %   05/19/17 1940 98.4 °F (36.9 °C) 83 16 121/63 96 %   05/19/17 1635 97.9 °F (36.6 °C) 77 16 120/65 97 %         Intake/Output Summary (Last 24 hours) at 05/20/17 1441  Last data filed at 05/20/17 1226    Gross per 24 hour   Intake 590 ml   Output 3925 ml   Net -3335 ml         PHYSICAL EXAM:       Visit Vitals    /51 (BP 1 Location: Right arm, BP Patient Position: At rest)    Pulse 76    Temp 98.4 °F (36.9 °C)    Resp 16    Ht 6' 1\" (1.854 m)    Wt 127 kg (280 lb)    SpO2 98%    BMI 36.94 kg/m2                 General:  Alert, cooperative, no distress, appears stated age. Head:  Normocephalic, without obvious abnormality, atraumatic. Eyes:  PERRL, EOMs intact. Ears:  Normal external ears. Nose: Nares normal. Septum midline. Mucosa normal. No drainage or sinus tenderness. Throat: Lips, mucosa, moist. .   Neck: Supple, symmetrical, trachea midline, no adenopathy, thyroid: no enlargement/tenderness/nodules, no carotid bruit and no JVD. Back:  Symmetric,    Lungs:  Clear to auscultation bilaterally.    Chest wall:  No tenderness    Heart:  Regular rate and rhythm,    Abdomen:  C/c ascites.                Skin: Skin color, texture, turgor normal. No rashes or lesions   Lymph nodes: Cervical, supraclavicular, and axillary nodes normal.   Neurologic: Non focal                        Reviewed most current radiology test results y  Review and summation of old records today  y  Reviewed patient's current orders and MAR  y  PMH/ reviewed - no change compared to H&P  ______________________________________________________________________  Medicines:     Current Facility-Administered Medications:    potassium chloride SR (KLOR-CON 10) tablet 40 mEq, 40 mEq, Oral, ONCE, Linda Pulido MD   albumin human 5% (BUMINATE) solution 25 g, 25 g, IntraVENous, BID, Garrett Vila MD, Last Rate: 250 mL/hr at 05/20/17 1046, 25 g at 05/20/17 1046   oxyCODONE IR (ROXICODONE) tablet 2.5 mg, 2.5 mg, Oral, Q6H PRN, Vickymunt Giorgio, NP   furosemide (LASIX) injection 80 mg, 80 mg, IntraVENous, BID, Abraham Patten MD, 80 mg at 05/20/17 0959   dextrose 10 % infusion 125-250 mL, 125-250 mL, IntraVENous, PRN, Leopold Mylar, MD   0.9% sodium chloride infusion 250 mL, 250 mL, IntraVENous, PRN, Shyrl Prader, MD   insulin lispro (HUMALOG) injection, , SubCUTAneous, AC&HS, Leopold Mylar, MD, Stopped at 05/20/17 0730   glucose chewable tablet 16 g, 4 Tab, Oral, PRN, Leopold Mylar, MD   glucagon (GLUCAGEN) injection 1 mg, 1 mg, IntraMUSCular, PRN, Leopold Mylar, MD   0.9% sodium chloride infusion 250 mL, 250 mL, IntraVENous, PRN, Yuki Ge MD   simvastatin (ZOCOR) tablet 10 mg, 10 mg, Oral, QHS, Ivone Johnson, NP, 10 mg at 05/19/17 2112   sodium chloride (NS) flush 5-10 mL, 5-10 mL, IntraVENous, Q8H, DARION Ochoa-C, 10 mL at 05/20/17 0617   sodium chloride (NS) flush 5-10 mL, 5-10 mL, IntraVENous, PRN, Renetta Garibay PA-C   acetaminophen (TYLENOL) tablet 650 mg, 650 mg, Oral, Q6H, DARION Ochoa-C, 650 mg at 05/20/17 1223   acetaminophen (TYLENOL) tablet 650 mg, 650 mg, Oral, Q6H PRN, DARION Ochoa-GAGE   naloxone [facility]) injection 0.4 mg, 0.4 mg, IntraVENous, PRN, DARION Ochoa-GAGE   hydrOXYzine HCl (ATARAX) tablet 10 mg, 10 mg, Oral, Q8H PRN, DARION Ochoa-GAGE   bisacodyl (DULCOLAX) suppository 10 mg, 10 mg, Rectal, DAILY PRN, Renetta Garibay PA-C   metoprolol tartrate (LOPRESSOR) tablet 25 mg, 25 mg, Oral, QHS, DARION Ochoa-C, 25 mg at 05/19/17 2110   ursodiol (ACTIGALL) capsule 300 mg, 300 mg, Oral, BID, Renetta Garibay PA-C, 300 mg at 05/20/17 1000   dilTIAZem CD (CARDIZEM CD) capsule 120 mg, 120 mg, Oral, DAILY, Renetta Garibay PA-C, 120 mg at 05/20/17 1000   ferrous sulfate tablet 325 mg, 325 mg, Oral, QHS, Renetta Garibay PA-C, 325 mg at 05/19/17 1204   lactulose (CHRONULAC) solution 10 g, 10 g, Oral, TID, Renetta Garibay PA-C, 10 g at 05/19/17 5653   rifAXIMin [facility]) tablet 550 mg, 550 mg, Oral, TID, Kina Gutting, PA-C, 550 mg at 05/20/17 1000   allopurinol (ZYLOPRIM) tablet 100 mg, 100 mg, Oral, DAILY, Kina Gutting, PA-C, 100 mg at 05/20/17 1000   pantoprazole (PROTONIX) tablet 40 mg, 40 mg, Oral, ACB, Kina Gutting, PA-C, 40 mg at 05/20/17 0164   Warfarin- pharmacy to dose, , Other, Rx Dosing/Monitoring, Jadyn Linda MD  Procedures: see electronic medical records for all procedures/Xrays and details which were not copied into this note but were reviewed prior to creation of Plan.      LABS:       Recent Labs      05/19/17  0338 05/18/17  0302   HGB 9.0* 8.9*            Recent Labs      05/20/17  0339 05/19/17  0338 05/18/17  0302    140 138   K 3.4* 3.8 4.1    108 106   CO2 26 25 26   BUN 34* 36* 39*   CREA 1.95* 2.05* 2.14*   * 117* 121*   CA 8.3* 8.7 8.7   MG 1.5* 1.6 1.7   PHOS 3.3 3.6 3.7            Recent Labs      05/20/17  0339 05/19/17  0338 05/18/17  0302   ALB 2.3* 2.1* 2.1*            Recent Labs      05/20/17  0339 05/19/17  0338 05/18/17  0302   INR 2.6* 2.4* 2.0*   PTP 26.1* 24.5* 20.9*      No results for input(s): FE, TIBC, PSAT, FERR in the last 72 hours. Lab Results   Component Value Date/Time     Folate 6.6 11/03/2016 02:46 AM      No results for input(s): PH, PCO2, PO2 in the last 72 hours. No results for input(s): PHI, PO2I, PCO2I in the last 72 hours.   No results for input(s): CPK, CKNDX, TROIQ in the last 72 hours.     No lab exists for component: CPKMB  No results found for: CHOL, CHOLX, CHLST, CHOLV, HDL, LDL, DLDL, LDLC, DLDLP, TGL, TGLX, TRIGL, TRIGP, CHHD, CHHDX        Lab Results   Component Value Date/Time     Glucose (POC) 136 05/20/2017 11:18 AM     Glucose (POC) 131 05/20/2017 06:16 AM     Glucose (POC) 251 05/19/2017 09:18 PM     Glucose (POC) 157 05/19/2017 04:08 PM     Glucose (POC) 178 05/19/2017 11:59 AM            Lab Results   Component Value Date/Time     Color YELLOW/STRAW 02/13/2017 03:52 PM     Appearance CLEAR 02/13/2017 03:52 PM     Specific gravity 1.019 02/13/2017 03:52 PM     pH (UA) 5.0 02/13/2017 03:52 PM     Protein NEGATIVE  02/13/2017 03:52 PM     Glucose NEGATIVE  02/13/2017 03:52 PM     Ketone NEGATIVE  02/13/2017 03:52 PM     Bilirubin NEGATIVE  02/13/2017 03:52 PM     Urobilinogen 1.0 02/13/2017 03:52 PM     Nitrites NEGATIVE  02/13/2017 03:52 PM     Leukocyte Esterase NEGATIVE  02/13/2017 03:52 PM     Epithelial cells FEW 02/13/2017 03:52 PM     Bacteria NEGATIVE  02/13/2017 03:52 PM     WBC 0-4 02/13/2017 03:52 PM     RBC 0-5 02/13/2017 03:52 PM               CULTURES:     Lab Results   Component Value Date/Time     Specimen Description: URINE 03/15/2012 11:55 AM            Lab Results   Component Value Date/Time     Culture result: MRSA NOT PRESENT 02/13/2017 03:15 PM     Culture result:   02/13/2017 03:15 PM       Screening of patient nares for MRSA is for surveillance purposes and, if positive, to facilitate isolation considerations in high risk settings. It is not intended for automatic decolonization interventions per se as regimens are not sufficiently effective to warrant routine use.     Culture result: NO GROWTH ON SOLID MEDIA AT 14 DAYS 11/10/2016 04:05 PM     Culture result:   11/10/2016 04:05 PM       STAPHYLOCOCCUS EPIDERMIDIS  ISOLATED FROM THIO BROTH ONLY     Culture result:   11/10/2016 04:05 PM       PRELIMINARY RESULT OF GRAM POSITIVE COCCI IN CLUSTERS  SEEN ON GRAM STAIN OF THE THIO BROTH  CALLED TO AND READ BACK BY  Fredy Sandoval AT 1054, 11/13/16 DB     Culture result: (THERE IS NO AEROBIC CULTURE ORDER) 11/10/2016 04:05 PM          CT Results (most recent):     Results from [place] encounter on 10/31/16   CT LOW EXT RT WO CONT    Narrative INDICATION: post-op anemia, eval for hematoma     EXAM: CT right lower extremity. No comparisons. Thin section axial images were obtained. From these sagittal and coronal  reformats were performed.  CT dose reduction was achieved through use of a  standardized protocol tailored for this examination and automatic exposure  control for dose modulation. FINDINGS: Patient is post cemented total knee arthroplasty on the right with a  large radiolucent spacer. There is a moderate complex joint effusion. There is  no soft tissue hematoma demonstrated. Mild edema is noted in the subcutaneous tissues slightly asymmetric to the left. There are vascular calcifications. There are severe atrophy of the right  peroneus brevis and longus muscle bodies. Gas within the soft tissues is not  unexpected post recent surgery. There is diverticulosis of the sigmoid colon. No dilated loops of bowel are  noted within the pelvis           Impression IMPRESSION:  1. No soft tissue hematoma post right total knee arthroplasty                      DISCHARGE DIAGNOSES / PLAN:      1.  as above       PENDING TEST RESULTS:   At the time of discharge the following test results are still pending: na    FOLLOW UP APPOINTMENTS:    Follow-up Information     Follow up With Details Comments [address] [place] [facility]   [phone]    Gustavo Stanley MD On 5/22/2017 For discharge follow up at 10:30AM  [address] [address] [facility]      John Sommer MD In 1 week  [address]      Lucita Cortez MD In 1 week  [address] [place] [id number] [phone]             ADDITIONAL CARE RECOMMENDATIONS: na    DIET: Cardiac Diet     ACTIVITY: Activity as tolerated    WOUND CARE: as directed     EQUIPMENT needed: na      DISCHARGE MEDICATIONS:  Current Discharge Medication List      START taking these medications    Details   oxyCODONE IR (ROXICODONE) 5 mg immediate release tablet Take 1-2 Tabs by mouth every four (4) hours as needed for Pain.  Max Daily Amount: 60 mg.  Qty: 90 Tab, Refills: 0         CONTINUE these medications which have CHANGED    Details   furosemide (LASIX) 40 mg tablet One tablet 2 times a day  Qty: 60 Tab, Refills: 0      simvastatin (ZOCOR) 10 mg tablet Take 1 Tab by mouth nightly. Qty: 30 Tab, Refills: 0      warfarin (COUMADIN) 5 mg tablet Take one on M, W, F,  Take 1/2 of a tablet on Tuesday. , Thursday, and Saturday;  and none on Sunday. ( note:  no coumadin today 5/22)  Qty: 30 Tab, Refills: 0         CONTINUE these medications which have NOT CHANGED    Details   dilTIAZem ER (CARDIZEM LA) 120 mg tablet Take 120 mg by mouth every morning. metoprolol tartrate (LOPRESSOR) 25 mg tablet Take 25 mg by mouth nightly. ferrous sulfate (SLOW FE) 142 mg (45 mg iron) ER tablet Take  by mouth nightly. ursodiol (ACTIGALL) 300 mg capsule Take 300 mg by mouth two (2) times a day. lactulose (CHRONULAC) 10 gram/15 mL solution Take  by mouth three (3) times daily. 2 table spoons 3 times per day as needed      rifAXIMin (XIFAXAN) 550 mg tablet Take 550 mg by mouth three (3) times daily. Indications: HEPATIC ENCEPHALOPATHY      allopurinol (ZYLOPRIM) 100 mg tablet Take 100 mg by mouth daily. Omeprazole delayed release (PRILOSEC D/R) 20 mg tablet Take 20 mg by mouth daily. NOTIFY YOUR PHYSICIAN FOR ANY OF THE FOLLOWING:   Fever over 101 degrees for 24 hours. Chest pain, shortness of breath, fever, chills, nausea, vomiting, diarrhea, change in mentation, falling, weakness, bleeding. Severe pain or pain not relieved by medications. Or, any other signs or symptoms that you may have questions about.     DISPOSITION:    Home With:   OT  PT  HH  RN       Long term SNF/Inpatient Rehab   x Independent/assisted living    Hospice    Other:       PATIENT CONDITION AT DISCHARGE:     Functional status    Poor     Deconditioned    x Independent      Cognition    x Lucid     Forgetful     Dementia      Catheters/lines (plus indication)    Echavarria     PICC     PEG    x None      Code status x  Full code     DNR      PHYSICAL EXAMINATION AT DISCHARGE:   Refer to Progress Note  Visit Vitals    /64 (BP 1 Location: Left arm, BP Patient Position: Sitting)    Pulse 73    Temp 98 °F (36.7 °C)    Resp 16    Ht 6' 1\" (1.854 m)    Wt 127 kg (280 lb)    SpO2 97%    BMI 36.94 kg/m2          CHRONIC MEDICAL DIAGNOSES:  Problem List as of 5/22/2017  Date Reviewed: 5/12/2017          Codes Class Noted - Resolved    * (Principal)Failed total right knee replacement ([facility] ICD-10-CM: R28.690X  ICD-9-CM: 996.47, V43.65  5/11/2017 - Present    Overview Signed 5/11/2017 11:22 PM by José Miguel Sampson PA-C     REMOVAL OF HARDWARE & REVISION RIGHT TOTAL KNEE REPLACEMENT 5/12/17             Infected prosthetic knee joint ([facility] ICD-10-CM: T84.59XA, H21.674  ICD-9-CM: 996.66, V43.65  10/31/2016 - Present        Infection of total knee replacement ([facility] ICD-10-CM: T84.59XA, Z96.659  ICD-9-CM: 996.66, V43.65  10/30/2016 - Present    Overview Signed 10/30/2016  9:07 PM by Alejandra Fernandez MD     RESECTION RIGHT TOTAL KNEE REPLACEMENT 10-31-16             Thromboembolus ([facility] ICD-10-CM: I74.9  ICD-9-CM: 444.9  Unknown - Present    Overview Signed 10/30/2016  9:07 PM by Alejandra Fernandez MD     RLE, JEB             Rosacea ICD-10-CM: L71.9  ICD-9-CM: 695.3  Unknown - Present        Lymphedema ICD-10-CM: I89.0  ICD-9-CM: 457.1  Unknown - Present    Overview Signed 10/30/2016  9:07 PM by MD SAMUEL Gunter             Iron deficiency anemia ICD-10-CM: D50.9  ICD-9-CM: 280.9  Unknown - Present    Overview Signed 10/30/2016  9:08 PM by Alejandra Fernandez MD     RECEIVED IRON INFUSIONS              Diabetes ([facility] ICD-10-CM: E11.9  ICD-9-CM: 250.00  Unknown - Present        Cirrhosis of liver ([facility] ICD-10-CM: K74.60  ICD-9-CM: 571.5  Unknown - Present        Chronic pain ICD-10-CM: G89.29  ICD-9-CM: 338.29  Unknown - Present        Chronic cough ICD-10-CM: R05  ICD-9-CM: 786.2  Unknown - Present        Atrial tachycardia ([address] ICD-10-CM: I47.1  ICD-9-CM: 427.89  Unknown - Present    Overview Signed 10/30/2016  9:08 PM by MD DR NIRU Miles [facility]             Arthritis ICD-10-CM: M19.90  ICD-9-CM: 716.90  Unknown - Present        Arrhythmia ICD-10-CM: I49.9  ICD-9-CM: 427.9  Unknown - Present        Spider angioma ICD-10-CM: I78.1  ICD-9-CM: 448.1  1/1/2016 - Present    Overview Signed 10/30/2016  9:07 PM by Luis Fernando Buchanan MD     upper torso- Dr. Judith Talavera             CAMPOVERDE (nonalcoholic steatohepatitis) ICD-10-CM: K75.81  ICD-9-CM: 571.8  1/1/2016 - Present    Overview Signed 10/30/2016  9:07 PM by MD Dr. Storm Miles             Gastric varices ICD-10-CM: I86.4  ICD-9-CM: 456.8  1/1/2016 - Present    Overview Signed 10/30/2016  9:08 PM by MD Dr. Judith Miles             Osteoarthritis of knee (Chronic) ICD-10-CM: M17.10  ICD-9-CM: 715.36  4/10/2012 - Present        Esophageal tear ICD-10-CM: L47.61VP  ICD-9-CM: 874.4  1/1/2011 - Present        GERD (gastroesophageal reflux disease) ICD-10-CM: K21.9  ICD-9-CM: 530.81  1/1/2007 - Present    Overview Signed 10/30/2016  9:08 PM by Luis Fernando Buchanan MD     TORE ESOPHAGUS             Anesthesia complication QCR-72-EF: M13.71QI  ICD-9-CM: 995.22  1/1/1969 - Present    Overview Signed 10/30/2016  9:08 PM by Luis Fernando Buchanan MD     violent after anesthesia                    Greater than  30  minutes were spent with the patient on counseling and coordination of care    Signed:   Gerson Vogel MD  5/22/2017  2:12 PM

## 2017-05-22 NOTE — PROGRESS NOTES
Nephrology Progress Note  Abhishek Briceño III  Date of Admission : 5/12/2017    CC: Follow up for HERBIE       Assessment and Plan     HERBIE on CKD:  - 2/2 toradol + ATN from hypotension  - Cr stable and close to baseline  - switch to oral diuretics today  - ok for d/c from renal standpoint  - would d/c on lasix 40mg po BID for now  - will need to follow up with Dr. Aury Gonzalez in 1 week after d/c    Hypokalemia  - replete as needed    CKD III:  - baseline Cr around 1.8  - likely from chronic HTN and DM2    Anemia:  - hgb stable  - s/p 2 units of blood on 5/15    Cirrhosis, CAMPOVERDE    DM2:  - per primary service    HTN:  - BP stable    Gout:  - cont allopurinol    Staph infection of right TKR, S/P removal 5/12/17       Interval History:  Seen and examined. No new issues. Cr stable, UOP stable. No cp, sob, n/v/d. Current Medications: all current  Medications have been eviewed in EPIC  Review of Systems: Pertinent items are noted in HPI.     Objective:  Vitals:    Vitals:    05/21/17 2101 05/22/17 0253 05/22/17 0650 05/22/17 0837   BP: 119/60 98/50 100/56 108/64   Pulse: 84 71 70 73   Resp: 16 16 16 16   Temp: 100.3 °F (37.9 °C) 98.5 °F (36.9 °C) 98.9 °F (37.2 °C) 98 °F (36.7 °C)   SpO2: 95% 95% 96% 97%   Weight:       Height:         Intake and Output:  05/22 0701 - 05/22 1900  In: -   Out: 600 [Urine:600]  05/20 1901 - 05/22 0700  In: 960 [P.O.:960]  Out: 4425 [Urine:4425]    Physical Examination:  General: NAD,Conversant   Neck:  Supple, no mass  Resp:  Lungs CTA B/L, no wheezing , normal respiratory effort  CV:  RRR,  no murmur or rub, 3+ b/l LE edema  GI:  Soft, NT, + Bowel sounds, no hepatosplenomegaly  Neurologic:  Non focal  Psych:             AAO x 3 appropriate affect       []    High complexity decision making was performed  []    Patient is at high-risk of decompensation with multiple organ involvement    Lab Data Personally Reviewed: I have reviewed all the pertinent labs, microbiology data and radiology studies during assessment. Recent Labs      05/22/17   0721 05/21/17   0521 05/20/17   0339   NA  135*  138  140   K  3.6  3.6  3.4*   CL  101  103  105   CO2  26  24  26   GLU  104*  131*  117*   BUN  30*  30*  34*   CREA  2.03*  1.93*  1.95*   CA  8.3*  8.3*  8.3*   MG   --    --   1.5*   PHOS  3.2  2.9  3.3   ALB  2.9*  2.7*  2.3*   INR  2.7*  2.6*  2.6*     Recent Labs      05/22/17   0721   WBC  7.2   HGB  8.9*   HCT  26.9*   PLT  77*     Lab Results   Component Value Date/Time    Specimen Description: URINE 03/15/2012 11:55 AM     Lab Results   Component Value Date/Time    Culture result: MRSA NOT PRESENT 02/13/2017 03:15 PM    Culture result:  02/13/2017 03:15 PM         Screening of patient nares for MRSA is for surveillance purposes and, if positive, to facilitate isolation considerations in high risk settings. It is not intended for automatic decolonization interventions per se as regimens are not sufficiently effective to warrant routine use.     Culture result: NO GROWTH ON SOLID MEDIA AT 14 DAYS 11/10/2016 04:05 PM    Culture result:  11/10/2016 04:05 PM     STAPHYLOCOCCUS EPIDERMIDIS  ISOLATED FROM THIO BROTH ONLY      Culture result:  11/10/2016 04:05 PM     PRELIMINARY RESULT OF GRAM POSITIVE COCCI IN CLUSTERS  SEEN ON GRAM STAIN OF THE THIO BROTH  CALLED TO AND READ BACK BY  Indy Moore AT 1054, 11/13/16 DB      Culture result: (THERE IS NO AEROBIC CULTURE ORDER) 11/10/2016 04:05 PM     Recent Results (from the past 24 hour(s))   GLUCOSE, POC    Collection Time: 05/21/17 11:26 AM   Result Value Ref Range    Glucose (POC) 161 (H) 65 - 100 mg/dL    Performed by [id number] [facility] POC    Collection Time: 05/21/17  5:01 PM   Result Value Ref Range    Glucose (POC) 125 (H) 65 - 100 mg/dL    Performed by [id number] [facility], POC    Collection Time: 05/21/17  9:24 PM   Result Value Ref Range    Glucose (POC) 244 (H) 65 - 100 mg/dL    Performed by [address] [place] [address] Floor [facility], [facility] Collection Time: 05/22/17  6:17 AM   Result Value Ref Range    Glucose (POC) 114 (H) 65 - 100 mg/dL    [id number]    Collection Time: 05/22/17  7:21 AM   Result Value Ref Range    INR 2.7 (H) 0.9 - 1.1      Prothrombin time 28.4 (H) 9.0 - 11.1 sec   CBC W/O DIFF    Collection Time: 05/22/17  7:21 AM   Result Value Ref Range    WBC 7.2 4.1 - 11.1 K/uL    RBC 3.20 (L) 4.10 - 5.70 M/uL    HGB 8.9 (L) 12.1 - 17.0 g/dL    HCT 26.9 (L) 36.6 - 50.3 %    MCV 84.1 80.0 - 99.0 FL    MCH 27.8 26.0 - 34.0 PG    MCHC 33.1 30.0 - 36.5 g/dL    RDW 19.7 (H) 11.5 - 14.5 %    PLATELET 77 (L) 776 - 400 K/uL   RENAL FUNCTION PANEL    Collection Time: 05/22/17  7:21 AM   Result Value Ref Range    Sodium 135 (L) 136 - 145 mmol/L    Potassium 3.6 3.5 - 5.1 mmol/L    Chloride 101 97 - 108 mmol/L    CO2 26 21 - 32 mmol/L    Anion gap 8 5 - 15 mmol/L    Glucose 104 (H) 65 - 100 mg/dL    BUN 30 (H) 6 - 20 MG/DL    Creatinine 2.03 (H) 0.70 - 1.30 MG/DL    BUN/Creatinine ratio 15 12 - 20      GFR est AA 40 (L) >60 ml/min/1.73m2    GFR est non-AA 33 (L) >60 ml/min/1.73m2    Calcium 8.3 (L) 8.5 - 10.1 MG/DL    Phosphorus 3.2 2.6 - 4.7 MG/DL    Albumin 2.9 (L) 3.5 - 5.0 g/dL             Abraham Patten MD  [facility]   [address] [address]  Phone - [phone]   Fax - [phone]  [email or web address]

## 2017-05-23 ENCOUNTER — HOME CARE VISIT (OUTPATIENT)
Dept: SCHEDULING | Facility: HOME HEALTH | Age: 62
End: 2017-05-23
Payer: COMMERCIAL

## 2017-05-23 PROCEDURE — G0151 HHCP-SERV OF PT,EA 15 MIN: HCPCS

## 2017-05-23 PROCEDURE — 400013 HH SOC

## 2017-05-23 PROCEDURE — G0299 HHS/HOSPICE OF RN EA 15 MIN: HCPCS

## 2017-05-24 ENCOUNTER — HOME CARE VISIT (OUTPATIENT)
Dept: HOME HEALTH SERVICES | Facility: HOME HEALTH | Age: 62
End: 2017-05-24
Payer: COMMERCIAL

## 2017-05-24 VITALS
SYSTOLIC BLOOD PRESSURE: 120 MMHG | DIASTOLIC BLOOD PRESSURE: 58 MMHG | TEMPERATURE: 64.4 F | RESPIRATION RATE: 18 BRPM | OXYGEN SATURATION: 99 % | HEART RATE: 78 BPM

## 2017-05-24 VITALS — RESPIRATION RATE: 17 BRPM | HEART RATE: 80 BPM

## 2017-05-25 ENCOUNTER — HOME CARE VISIT (OUTPATIENT)
Dept: SCHEDULING | Facility: HOME HEALTH | Age: 62
End: 2017-05-25
Payer: COMMERCIAL

## 2017-05-25 ENCOUNTER — HOME CARE VISIT (OUTPATIENT)
Dept: HOME HEALTH SERVICES | Facility: HOME HEALTH | Age: 62
End: 2017-05-25
Payer: COMMERCIAL

## 2017-05-25 VITALS
SYSTOLIC BLOOD PRESSURE: 124 MMHG | HEART RATE: 72 BPM | DIASTOLIC BLOOD PRESSURE: 68 MMHG | RESPIRATION RATE: 18 BRPM | TEMPERATURE: 98.2 F | OXYGEN SATURATION: 98 %

## 2017-05-25 PROCEDURE — G0299 HHS/HOSPICE OF RN EA 15 MIN: HCPCS

## 2017-05-26 ENCOUNTER — HOME CARE VISIT (OUTPATIENT)
Dept: HOME HEALTH SERVICES | Facility: HOME HEALTH | Age: 62
End: 2017-05-26
Payer: COMMERCIAL

## 2017-05-26 PROCEDURE — G0157 HHC PT ASSISTANT EA 15: HCPCS

## 2017-05-30 ENCOUNTER — HOME CARE VISIT (OUTPATIENT)
Dept: SCHEDULING | Facility: HOME HEALTH | Age: 62
End: 2017-05-30
Payer: COMMERCIAL

## 2017-05-30 VITALS
OXYGEN SATURATION: 98 % | DIASTOLIC BLOOD PRESSURE: 60 MMHG | HEART RATE: 68 BPM | SYSTOLIC BLOOD PRESSURE: 122 MMHG | TEMPERATURE: 98.2 F | RESPIRATION RATE: 18 BRPM

## 2017-05-30 PROCEDURE — G0299 HHS/HOSPICE OF RN EA 15 MIN: HCPCS

## 2017-05-30 PROCEDURE — G0157 HHC PT ASSISTANT EA 15: HCPCS

## 2017-05-31 ENCOUNTER — HOME CARE VISIT (OUTPATIENT)
Dept: SCHEDULING | Facility: HOME HEALTH | Age: 62
End: 2017-05-31
Payer: COMMERCIAL

## 2017-05-31 PROCEDURE — G0157 HHC PT ASSISTANT EA 15: HCPCS

## 2017-06-01 ENCOUNTER — HOME CARE VISIT (OUTPATIENT)
Dept: SCHEDULING | Facility: HOME HEALTH | Age: 62
End: 2017-06-01
Payer: COMMERCIAL

## 2017-06-01 VITALS
OXYGEN SATURATION: 96 % | RESPIRATION RATE: 20 BRPM | DIASTOLIC BLOOD PRESSURE: 74 MMHG | SYSTOLIC BLOOD PRESSURE: 122 MMHG | HEART RATE: 65 BPM | TEMPERATURE: 99.2 F

## 2017-06-01 PROCEDURE — G0299 HHS/HOSPICE OF RN EA 15 MIN: HCPCS

## 2017-06-06 ENCOUNTER — HOME CARE VISIT (OUTPATIENT)
Dept: SCHEDULING | Facility: HOME HEALTH | Age: 62
End: 2017-06-06
Payer: COMMERCIAL

## 2017-06-06 VITALS
TEMPERATURE: 99 F | RESPIRATION RATE: 18 BRPM | DIASTOLIC BLOOD PRESSURE: 64 MMHG | SYSTOLIC BLOOD PRESSURE: 138 MMHG | OXYGEN SATURATION: 98 % | HEART RATE: 74 BPM

## 2017-06-06 PROCEDURE — G0299 HHS/HOSPICE OF RN EA 15 MIN: HCPCS

## 2017-06-06 PROCEDURE — G0157 HHC PT ASSISTANT EA 15: HCPCS

## 2017-06-08 ENCOUNTER — HOME CARE VISIT (OUTPATIENT)
Dept: SCHEDULING | Facility: HOME HEALTH | Age: 62
End: 2017-06-08
Payer: COMMERCIAL

## 2017-06-08 ENCOUNTER — HOME CARE VISIT (OUTPATIENT)
Dept: HOME HEALTH SERVICES | Facility: HOME HEALTH | Age: 62
End: 2017-06-08
Payer: COMMERCIAL

## 2017-06-08 PROCEDURE — G0151 HHCP-SERV OF PT,EA 15 MIN: HCPCS

## 2017-06-11 VITALS — HEART RATE: 80 BPM | RESPIRATION RATE: 17 BRPM

## 2017-06-15 ENCOUNTER — HOME CARE VISIT (OUTPATIENT)
Dept: HOME HEALTH SERVICES | Facility: HOME HEALTH | Age: 62
End: 2017-06-15
Payer: COMMERCIAL

## 2017-08-29 ENCOUNTER — APPOINTMENT (OUTPATIENT)
Dept: INFUSION THERAPY | Age: 62
End: 2017-08-29

## 2017-09-12 ENCOUNTER — APPOINTMENT (OUTPATIENT)
Dept: INFUSION THERAPY | Age: 62
End: 2017-09-12

## 2017-09-26 ENCOUNTER — APPOINTMENT (OUTPATIENT)
Dept: INFUSION THERAPY | Age: 62
End: 2017-09-26

## 2017-10-10 ENCOUNTER — APPOINTMENT (OUTPATIENT)
Dept: INFUSION THERAPY | Age: 62
End: 2017-10-10

## 2017-10-24 ENCOUNTER — APPOINTMENT (OUTPATIENT)
Dept: INFUSION THERAPY | Age: 62
End: 2017-10-24

## 2017-11-07 ENCOUNTER — APPOINTMENT (OUTPATIENT)
Dept: INFUSION THERAPY | Age: 62
End: 2017-11-07

## 2017-11-21 ENCOUNTER — APPOINTMENT (OUTPATIENT)
Dept: INFUSION THERAPY | Age: 62
End: 2017-11-21

## 2017-12-05 ENCOUNTER — APPOINTMENT (OUTPATIENT)
Dept: INFUSION THERAPY | Age: 62
End: 2017-12-05

## 2017-12-12 RX ORDER — SIMVASTATIN 10 MG/1
TABLET, FILM COATED ORAL
Qty: 30 TAB | Refills: 0 | Status: SHIPPED | OUTPATIENT
Start: 2017-12-12

## 2017-12-19 ENCOUNTER — APPOINTMENT (OUTPATIENT)
Dept: INFUSION THERAPY | Age: 62
End: 2017-12-19

## 2019-10-24 ENCOUNTER — HOSPITAL ENCOUNTER (OUTPATIENT)
Dept: PREADMISSION TESTING | Age: 64
Discharge: HOME | End: 2019-10-24
Payer: MEDICARE

## 2019-10-24 VITALS
DIASTOLIC BLOOD PRESSURE: 64 MMHG | SYSTOLIC BLOOD PRESSURE: 118 MMHG | HEIGHT: 74 IN | RESPIRATION RATE: 18 BRPM | WEIGHT: 210 LBS | BODY MASS INDEX: 26.95 KG/M2 | HEART RATE: 63 BPM | TEMPERATURE: 97.6 F

## 2019-10-24 LAB
ANION GAP SERPL CALC-SCNC: 4 MMOL/L (ref 5–15)
APPEARANCE UR: CLEAR
ATRIAL RATE: 67 BPM
BACTERIA URNS QL MICRO: NEGATIVE /HPF
BILIRUB UR QL: NEGATIVE
BUN SERPL-MCNC: 24 MG/DL (ref 6–20)
BUN/CREAT SERPL: 22 (ref 12–20)
CALCIUM SERPL-MCNC: 9.1 MG/DL (ref 8.5–10.1)
CALCULATED P AXIS, ECG09: 28 DEGREES
CALCULATED R AXIS, ECG10: -49 DEGREES
CALCULATED T AXIS, ECG11: 8 DEGREES
CHLORIDE SERPL-SCNC: 111 MMOL/L (ref 97–108)
CO2 SERPL-SCNC: 26 MMOL/L (ref 21–32)
COLOR UR: NORMAL
CREAT SERPL-MCNC: 1.08 MG/DL (ref 0.7–1.3)
DIAGNOSIS, 93000: NORMAL
EPITH CASTS URNS QL MICRO: NORMAL /LPF
ERYTHROCYTE [DISTWIDTH] IN BLOOD BY AUTOMATED COUNT: 15.7 % (ref 11.5–14.5)
EST. AVERAGE GLUCOSE BLD GHB EST-MCNC: 131 MG/DL
GLUCOSE SERPL-MCNC: 170 MG/DL (ref 65–100)
GLUCOSE UR STRIP.AUTO-MCNC: NEGATIVE MG/DL
HBA1C MFR BLD: 6.2 % (ref 4.2–6.3)
HCT VFR BLD AUTO: 40 % (ref 36.6–50.3)
HGB BLD-MCNC: 12.1 G/DL (ref 12.1–17)
HGB UR QL STRIP: NEGATIVE
HYALINE CASTS URNS QL MICRO: NORMAL /LPF (ref 0–5)
INR PPP: 5 (ref 0.9–1.1)
KETONES UR QL STRIP.AUTO: NEGATIVE MG/DL
LEUKOCYTE ESTERASE UR QL STRIP.AUTO: NEGATIVE
MCH RBC QN AUTO: 27.6 PG (ref 26–34)
MCHC RBC AUTO-ENTMCNC: 30.3 G/DL (ref 30–36.5)
MCV RBC AUTO: 91.3 FL (ref 80–99)
NITRITE UR QL STRIP.AUTO: NEGATIVE
NRBC # BLD: 0 K/UL (ref 0–0.01)
NRBC BLD-RTO: 0 PER 100 WBC
P-R INTERVAL, ECG05: 218 MS
PH UR STRIP: 5.5 [PH] (ref 5–8)
PLATELET # BLD AUTO: 150 K/UL (ref 150–400)
PMV BLD AUTO: 9.9 FL (ref 8.9–12.9)
POTASSIUM SERPL-SCNC: 4.7 MMOL/L (ref 3.5–5.1)
PROT UR STRIP-MCNC: NEGATIVE MG/DL
PROTHROMBIN TIME: 46.4 SEC (ref 9–11.1)
Q-T INTERVAL, ECG07: 450 MS
QRS DURATION, ECG06: 136 MS
QTC CALCULATION (BEZET), ECG08: 475 MS
RBC # BLD AUTO: 4.38 M/UL (ref 4.1–5.7)
RBC #/AREA URNS HPF: NORMAL /HPF (ref 0–5)
SODIUM SERPL-SCNC: 141 MMOL/L (ref 136–145)
SP GR UR REFRACTOMETRY: 1.03 (ref 1–1.03)
UA: UC IF INDICATED,UAUC: NORMAL
UROBILINOGEN UR QL STRIP.AUTO: 1 EU/DL (ref 0.2–1)
VENTRICULAR RATE, ECG03: 67 BPM
WBC # BLD AUTO: 5.4 K/UL (ref 4.1–11.1)
WBC URNS QL MICRO: NORMAL /HPF (ref 0–4)

## 2019-10-24 PROCEDURE — 81001 URINALYSIS AUTO W/SCOPE: CPT

## 2019-10-24 PROCEDURE — 93005 ELECTROCARDIOGRAM TRACING: CPT

## 2019-10-24 PROCEDURE — 85610 PROTHROMBIN TIME: CPT

## 2019-10-24 PROCEDURE — 86922 COMPATIBILITY TEST ANTIGLOB: CPT

## 2019-10-24 PROCEDURE — 86920 COMPATIBILITY TEST SPIN: CPT

## 2019-10-24 PROCEDURE — 83036 HEMOGLOBIN GLYCOSYLATED A1C: CPT

## 2019-10-24 PROCEDURE — 85027 COMPLETE CBC AUTOMATED: CPT

## 2019-10-24 PROCEDURE — 86921 COMPATIBILITY TEST INCUBATE: CPT

## 2019-10-24 PROCEDURE — 80048 BASIC METABOLIC PNL TOTAL CA: CPT

## 2019-10-24 PROCEDURE — 86900 BLOOD TYPING SEROLOGIC ABO: CPT

## 2019-10-24 RX ORDER — MELATONIN 5 MG
10 CAPSULE ORAL
COMMUNITY

## 2019-10-24 RX ORDER — PANTOPRAZOLE SODIUM 40 MG/1
40 TABLET, DELAYED RELEASE ORAL DAILY
COMMUNITY

## 2019-10-24 RX ORDER — PREDNISONE 1 MG/1
5 TABLET ORAL
COMMUNITY
End: 2019-11-02 | Stop reason: DRUGHIGH

## 2019-10-24 RX ORDER — TACROLIMUS 1 MG/1
2 CAPSULE ORAL EVERY 12 HOURS
COMMUNITY

## 2019-10-24 RX ORDER — METFORMIN HYDROCHLORIDE 500 MG/1
1000 TABLET ORAL 2 TIMES DAILY WITH MEALS
COMMUNITY

## 2019-10-24 RX ORDER — TAMSULOSIN HYDROCHLORIDE 0.4 MG/1
0.4 CAPSULE ORAL
COMMUNITY

## 2019-10-24 RX ORDER — MYCOPHENOLATE MOFETIL 250 MG/1
250 CAPSULE ORAL 2 TIMES DAILY
COMMUNITY

## 2019-10-24 NOTE — PERIOP NOTES
PREOPERATIVE INSTRUCTIONS REVIEWED WITH PATIENT. INSTRUCTIONS ON USE OF CHG SOAP. TWO BOTTLES OF CHG SOAP GIVEN. PATIENT GIVEN SSI INFECTIONS SHEET, AS WELL AS HAND WASHING TIPS SHEET. MRSA/MSSA TREATMENT INSTRUCTION SHEET GIVEN WITH AN EXPLANATION TO PATIENT THAT THEY WILL BE NOTIFIED IF TREATMENT INSTRUCTIONS NEED TO BE INITIATED. PATIENT WAS GIVEN THE OPPORTUNITY TO ASK QUESTIONS, BASED ON THE INFORMATION PROVIDED. PT TO FAX PTA MEDICATION LIST TO [facility], FAX NUMBER GIVEN. WILL CALL PT BACK WITH DIRECTIONS. PT TO SEND OR CALL CARDIOLOGISTS NAME TO [facility], PHONE NUMBER GIVEN.     PT TO F/U WITH PRESCRIBING DR FOR PRE OP DIRECTIONS ON COUMADIN

## 2019-10-25 LAB
BACTERIA SPEC CULT: NORMAL
BACTERIA SPEC CULT: NORMAL
SERVICE CMNT-IMP: NORMAL

## 2019-10-25 NOTE — H&P
Kofi Nguyen III  Location: - SHORT PUMP OFFICE  Patient #: [id number]  : 1955   / Language: [place] / Race: White  Male      History of Present Illness Jazmyne Miranda; 10/23/2019 11:25 AM)  The patient is a 71year old male who presents with a complaint of Knee Pain. Previous surgeries have included total knee arthroplasty (right knee revision 2017). Problem List/Past Medical Jazmyen Miranda; 10/23/2019 11:26 AM)  Essential Hypertension    Status post revision of total knee replacement, right (V43.65  Z96.651)    REVIEW OF SYSTEMS: Systems were reviewed by the provider.    HAND, PAIN (719.43)    Effusion, right knee (719.06  M25.461)    Weight above 97th percentile (V49.89  Z78.9)    Pain due to total left knee replacement (996.77  T84.84XA)    HAND, TENDONITIS (727.05)    DIETARY COUNSELING (V65.3)    Gout (274.9  M10.9)    Primary osteoarthritis of left knee (715.16  M17.12)  [2017]: Allergies Jazmyne Shelby [address] [place] [address]; 10/23/2019 11:25 AM)  Atropine *CHEMICALS*    Penicillins    Allergies Reconciled      [facility] History [facility]; 10/23/2019 11:25 AM)  Hypercholesterolemia    Arthritis    Diabetes Mellitus      Social History Jazmyne Miranda; 10/13/0843 80:30 AM)  Illicit drug use   : none  Seat Belt Use   2013: always  Tobacco use   Never smoker. HIV risk factors   2013: no  Alcohol use   2013: Drinks liquor 2 times per week having 1-2 drinks per occasion, never having more than 5 drinks per occasion. Caffeine use   2013: Drinks soft drinks 1-2 times a day. Exercise   2013: Bicycles 3-4 times a week. Sun Exposure   2013: occasionally    Medication History Jazmyne Shelby [facility]; 10/23/2019 11:26 AM)  Cleocin  (300MG Capsule, 1 (one) Oral four times daily, take until finished, Taken starting 2014) Active.   Colchicine  (0.6MG Tablet, 1 (one) Tablet Oral every 8 hours, as needed, Taken starting 10/19/2016) Active. Hydrocod Polst-CPM Polst ER  (10-8MG/5ML Liquid ER, Oral) Active. Lisinopril  (10MG Tablet, Oral) Active. MetFORMIN HCl ER  (500MG Tablet ER 24HR, Oral) Active. Omeprazole  (20MG Capsule DR, Oral) Active. Warfarin Sodium  (2.5MG Tablet, Oral) Active. Medications Reconciled     Past Surgical History Meliton Shelby [facility]; 10/23/2019 11:25 AM)  Carpal Tunnel Repair   right  Total Knee Replacement   right  Arthroscopy of Knee   left    Diagnostic Studies History Meliton HATHAWAYZhane Bass [place]; 10/23/2019 11:25 AM)  Knee X-ray   Date: 4/18/2013, Results: Knee x-rays today 3 views show good position of his total knee without issue. Other Problems [facility]; 10/23/2019 11:26 AM)  Unspecified Diagnosis    Blood Clot    Gout    Unspecified Diagnosis    Diabetes Mellitus          Review of Systems Meliton Shelby [facility]; 10/23/2019 11:25 AM)  General Not Present- Chills and Fatigue. Skin Not Present- Bruising, Pallor and Skin Color Changes. Respiratory Not Present- Cough and Difficulty Breathing. Cardiovascular Not Present- Chest Pain, Fainting / Blacking Out and Rapid Heart Rate. Musculoskeletal Present- Joint Pain. Not Present- Decreased Range of Motion and Joint Swelling. Neurological Not Present- Dysesthesia, Paresthesias and Weakness In Extremities. Hematology Not Present- Abnormal Bleeding, Blood Clots and Petechiae. Vitals Meliton Shelby [facility]; 10/23/2019 11:25 AM)  10/23/2019 11:25 AM  Weight: 208 lb   Height: 74 in   Weight was reported by patient. Height was reported by patient. Body Surface Area: 2.21 m²   Body Mass Index: 26.71 kg/m²                Physical Exam Leilani Dillard MD; 10/24/2019 9:07 AM)  Musculoskeletal  Global Assessment  Examination of related systems reveals - well-developed, well-nourished, in no acute distress, alert and oriented x 3. Note: Right knee is still swollen. Assessment & Plan Shanita RADFORD Jayna Mcclellan MD; 10/24/2019 9:08 AM)  Effusion, right knee (719.06  M25.461)  Impression: We discussed all available treatment options. We have decided on irrigation and debridement, polyethylene exchange, and long-term antibiotic suppression. He understands this as a chance of failure. He does not want his knee out at this point. All questions are answered. Current Plans  Pt Education - How to [address] using Patient Portal and 3rd Party Apps: discussed with patient and provided information. Pt Education - Educational materials were provided.: discussed with patient and provided information.   REVIEW OF SYSTEMS: Systems were reviewed by the provider.(V49.9)  Weight above 97th percentile (V49.89  Z78.9)  Current Plans  LIFESTYLE EDUCATION REGARDING DIET (36803)      Signed by Hill George MD

## 2019-10-25 NOTE — PERIOP NOTES
AIDEN GRIMES NOTIFIED BY  OF THE FOLLOWING ABNORMAL LAB RESULTS:  PT-46.4  INR-5.0  GLUCOSE-170  SURGERY IS SCHEDULED FOR 10/30/19. EKG WAS SENT TO ANESTHESIA FOR REVIEW AND APPROVED BY DR. RICHTER. COPY OF EKG PLACED ON CHART AND ALL LABS AND EKG FAXED TO DR. REESE'S OFFICE WITH CONFIRMATION RECEIPT RECEIVED.

## 2019-10-30 ENCOUNTER — ANESTHESIA (OUTPATIENT)
Dept: SURGERY | Age: 64
DRG: 467 | End: 2019-10-30
Payer: MEDICARE

## 2019-10-30 ENCOUNTER — HOSPITAL ENCOUNTER (INPATIENT)
Age: 64
LOS: 2 days | Discharge: HOME HEALTH | DRG: 467 | End: 2019-11-01
Attending: ORTHOPAEDIC SURGERY | Admitting: ORTHOPAEDIC SURGERY
Payer: MEDICARE

## 2019-10-30 ENCOUNTER — ANESTHESIA EVENT (OUTPATIENT)
Dept: SURGERY | Age: 64
DRG: 467 | End: 2019-10-30
Payer: MEDICARE

## 2019-10-30 DIAGNOSIS — T84.012D FAILED TOTAL RIGHT KNEE REPLACEMENT, SUBSEQUENT ENCOUNTER: Primary | ICD-10-CM

## 2019-10-30 LAB
ANION GAP SERPL CALC-SCNC: 5 MMOL/L (ref 5–15)
BUN SERPL-MCNC: 22 MG/DL (ref 6–20)
BUN/CREAT SERPL: 22 (ref 12–20)
CALCIUM SERPL-MCNC: 8.2 MG/DL (ref 8.5–10.1)
CHLORIDE SERPL-SCNC: 109 MMOL/L (ref 97–108)
CO2 SERPL-SCNC: 24 MMOL/L (ref 21–32)
CREAT SERPL-MCNC: 1.01 MG/DL (ref 0.7–1.3)
GLUCOSE BLD STRIP.AUTO-MCNC: 158 MG/DL (ref 65–100)
GLUCOSE BLD STRIP.AUTO-MCNC: 168 MG/DL (ref 65–100)
GLUCOSE BLD STRIP.AUTO-MCNC: 339 MG/DL (ref 65–100)
GLUCOSE SERPL-MCNC: 170 MG/DL (ref 65–100)
INR BLD: 1.3 (ref 0.9–1.2)
INR PPP: 1.2 (ref 0.9–1.1)
POTASSIUM SERPL-SCNC: 5.1 MMOL/L (ref 3.5–5.1)
PROTHROMBIN TIME: 12.3 SEC (ref 9–11.1)
SERVICE CMNT-IMP: ABNORMAL
SODIUM SERPL-SCNC: 138 MMOL/L (ref 136–145)

## 2019-10-30 PROCEDURE — 74011250637 HC RX REV CODE- 250/637: Performed by: PHYSICIAN ASSISTANT

## 2019-10-30 PROCEDURE — 74011636637 HC RX REV CODE- 636/637: Performed by: PHYSICIAN ASSISTANT

## 2019-10-30 PROCEDURE — 0SPV0JZ REMOVAL OF SYNTHETIC SUBSTITUTE FROM RIGHT KNEE JOINT, TIBIAL SURFACE, OPEN APPROACH: ICD-10-PCS | Performed by: ORTHOPAEDIC SURGERY

## 2019-10-30 PROCEDURE — 76210000000 HC OR PH I REC 2 TO 2.5 HR: Performed by: ORTHOPAEDIC SURGERY

## 2019-10-30 PROCEDURE — 74011250636 HC RX REV CODE- 250/636: Performed by: INTERNAL MEDICINE

## 2019-10-30 PROCEDURE — 85610 PROTHROMBIN TIME: CPT

## 2019-10-30 PROCEDURE — 64447 NJX AA&/STRD FEMORAL NRV IMG: CPT

## 2019-10-30 PROCEDURE — 77030018673: Performed by: ORTHOPAEDIC SURGERY

## 2019-10-30 PROCEDURE — 36415 COLL VENOUS BLD VENIPUNCTURE: CPT

## 2019-10-30 PROCEDURE — 77030008462 HC STPLR SKN PROX J&J -A: Performed by: ORTHOPAEDIC SURGERY

## 2019-10-30 PROCEDURE — 77030012935 HC DRSG AQUACEL BMS -B: Performed by: ORTHOPAEDIC SURGERY

## 2019-10-30 PROCEDURE — 74011250636 HC RX REV CODE- 250/636: Performed by: NURSE ANESTHETIST, CERTIFIED REGISTERED

## 2019-10-30 PROCEDURE — 3E0T3BZ INTRODUCTION OF ANESTHETIC AGENT INTO PERIPHERAL NERVES AND PLEXI, PERCUTANEOUS APPROACH: ICD-10-PCS | Performed by: ANESTHESIOLOGY

## 2019-10-30 PROCEDURE — 0SBC0ZZ EXCISION OF RIGHT KNEE JOINT, OPEN APPROACH: ICD-10-PCS | Performed by: ORTHOPAEDIC SURGERY

## 2019-10-30 PROCEDURE — 77030018836 HC SOL IRR NACL ICUM -A: Performed by: ORTHOPAEDIC SURGERY

## 2019-10-30 PROCEDURE — C1776 JOINT DEVICE (IMPLANTABLE): HCPCS | Performed by: ORTHOPAEDIC SURGERY

## 2019-10-30 PROCEDURE — 0QDG0ZZ EXTRACTION OF RIGHT TIBIA, OPEN APPROACH: ICD-10-PCS | Performed by: ORTHOPAEDIC SURGERY

## 2019-10-30 PROCEDURE — 77030040361 HC SLV COMPR DVT MDII -B

## 2019-10-30 PROCEDURE — 82962 GLUCOSE BLOOD TEST: CPT

## 2019-10-30 PROCEDURE — 0SRV0JZ REPLACEMENT OF RIGHT KNEE JOINT, TIBIAL SURFACE WITH SYNTHETIC SUBSTITUTE, OPEN APPROACH: ICD-10-PCS | Performed by: ORTHOPAEDIC SURGERY

## 2019-10-30 PROCEDURE — 74011250636 HC RX REV CODE- 250/636: Performed by: ANESTHESIOLOGY

## 2019-10-30 PROCEDURE — 74011000250 HC RX REV CODE- 250: Performed by: NURSE ANESTHETIST, CERTIFIED REGISTERED

## 2019-10-30 PROCEDURE — 74011250636 HC RX REV CODE- 250/636: Performed by: PHYSICIAN ASSISTANT

## 2019-10-30 PROCEDURE — 77030011640 HC PAD GRND REM COVD -A: Performed by: ORTHOPAEDIC SURGERY

## 2019-10-30 PROCEDURE — 77030018846 HC SOL IRR STRL H20 ICUM -A: Performed by: ORTHOPAEDIC SURGERY

## 2019-10-30 PROCEDURE — 74011250637 HC RX REV CODE- 250/637: Performed by: ANESTHESIOLOGY

## 2019-10-30 PROCEDURE — 74011000250 HC RX REV CODE- 250: Performed by: ORTHOPAEDIC SURGERY

## 2019-10-30 PROCEDURE — 87075 CULTR BACTERIA EXCEPT BLOOD: CPT

## 2019-10-30 PROCEDURE — 65270000029 HC RM PRIVATE

## 2019-10-30 PROCEDURE — 76060000035 HC ANESTHESIA 2 TO 2.5 HR: Performed by: ORTHOPAEDIC SURGERY

## 2019-10-30 PROCEDURE — 87205 SMEAR GRAM STAIN: CPT

## 2019-10-30 PROCEDURE — 74011000258 HC RX REV CODE- 258: Performed by: INTERNAL MEDICINE

## 2019-10-30 PROCEDURE — 74011250636 HC RX REV CODE- 250/636: Performed by: ORTHOPAEDIC SURGERY

## 2019-10-30 PROCEDURE — 77030010509 HC AIRWY LMA MSK TELE -A: Performed by: NURSE ANESTHETIST, CERTIFIED REGISTERED

## 2019-10-30 PROCEDURE — 76010000162 HC OR TIME 1.5 TO 2 HR INTENSV-TIER 1: Performed by: ORTHOPAEDIC SURGERY

## 2019-10-30 PROCEDURE — 80048 BASIC METABOLIC PNL TOTAL CA: CPT

## 2019-10-30 PROCEDURE — 77030000032 HC CUF TRNQT ZIMM -B: Performed by: ORTHOPAEDIC SURGERY

## 2019-10-30 PROCEDURE — 77030031139 HC SUT VCRL2 J&J -A: Performed by: ORTHOPAEDIC SURGERY

## 2019-10-30 DEVICE — IMPLANTABLE DEVICE: Type: IMPLANTABLE DEVICE | Site: KNEE | Status: FUNCTIONAL

## 2019-10-30 RX ORDER — SODIUM CHLORIDE 9 MG/ML
125 INJECTION, SOLUTION INTRAVENOUS CONTINUOUS
Status: DISPENSED | OUTPATIENT
Start: 2019-10-30 | End: 2019-10-31

## 2019-10-30 RX ORDER — LIDOCAINE HYDROCHLORIDE 20 MG/ML
INJECTION, SOLUTION EPIDURAL; INFILTRATION; INTRACAUDAL; PERINEURAL AS NEEDED
Status: DISCONTINUED | OUTPATIENT
Start: 2019-10-30 | End: 2019-10-30 | Stop reason: HOSPADM

## 2019-10-30 RX ORDER — WARFARIN 10 MG/1
10 TABLET ORAL
Status: DISCONTINUED | OUTPATIENT
Start: 2019-10-30 | End: 2019-11-01 | Stop reason: HOSPADM

## 2019-10-30 RX ORDER — INSULIN LISPRO 100 [IU]/ML
INJECTION, SOLUTION INTRAVENOUS; SUBCUTANEOUS
Status: DISCONTINUED | OUTPATIENT
Start: 2019-10-30 | End: 2019-11-01 | Stop reason: HOSPADM

## 2019-10-30 RX ORDER — ACETAMINOPHEN 325 MG/1
650 TABLET ORAL ONCE
Status: COMPLETED | OUTPATIENT
Start: 2019-10-30 | End: 2019-10-30

## 2019-10-30 RX ORDER — PREDNISONE 5 MG/1
5 TABLET ORAL
Status: DISCONTINUED | OUTPATIENT
Start: 2019-10-31 | End: 2019-11-01 | Stop reason: HOSPADM

## 2019-10-30 RX ORDER — FENTANYL CITRATE 50 UG/ML
50 INJECTION, SOLUTION INTRAMUSCULAR; INTRAVENOUS AS NEEDED
Status: DISCONTINUED | OUTPATIENT
Start: 2019-10-30 | End: 2019-10-30 | Stop reason: HOSPADM

## 2019-10-30 RX ORDER — METOPROLOL TARTRATE 25 MG/1
12.5 TABLET, FILM COATED ORAL 2 TIMES DAILY
Status: DISCONTINUED | OUTPATIENT
Start: 2019-10-30 | End: 2019-11-01 | Stop reason: HOSPADM

## 2019-10-30 RX ORDER — SODIUM CHLORIDE 9 MG/ML
25 INJECTION, SOLUTION INTRAVENOUS CONTINUOUS
Status: DISCONTINUED | OUTPATIENT
Start: 2019-10-30 | End: 2019-10-30 | Stop reason: HOSPADM

## 2019-10-30 RX ORDER — MIDAZOLAM HYDROCHLORIDE 1 MG/ML
1 INJECTION, SOLUTION INTRAMUSCULAR; INTRAVENOUS AS NEEDED
Status: DISCONTINUED | OUTPATIENT
Start: 2019-10-30 | End: 2019-10-30 | Stop reason: HOSPADM

## 2019-10-30 RX ORDER — SODIUM CHLORIDE 0.9 % (FLUSH) 0.9 %
5-40 SYRINGE (ML) INJECTION EVERY 8 HOURS
Status: DISCONTINUED | OUTPATIENT
Start: 2019-10-30 | End: 2019-10-30 | Stop reason: HOSPADM

## 2019-10-30 RX ORDER — MAGNESIUM SULFATE 100 %
4 CRYSTALS MISCELLANEOUS AS NEEDED
Status: DISCONTINUED | OUTPATIENT
Start: 2019-10-30 | End: 2019-11-01 | Stop reason: HOSPADM

## 2019-10-30 RX ORDER — SODIUM CHLORIDE 0.9 % (FLUSH) 0.9 %
5-40 SYRINGE (ML) INJECTION AS NEEDED
Status: DISCONTINUED | OUTPATIENT
Start: 2019-10-30 | End: 2019-11-01 | Stop reason: HOSPADM

## 2019-10-30 RX ORDER — LANOLIN ALCOHOL/MO/W.PET/CERES
200 CREAM (GRAM) TOPICAL 2 TIMES DAILY
Status: DISCONTINUED | OUTPATIENT
Start: 2019-10-30 | End: 2019-11-01 | Stop reason: HOSPADM

## 2019-10-30 RX ORDER — POLYETHYLENE GLYCOL 3350 17 G/17G
17 POWDER, FOR SOLUTION ORAL DAILY
Status: DISCONTINUED | OUTPATIENT
Start: 2019-10-31 | End: 2019-11-01 | Stop reason: HOSPADM

## 2019-10-30 RX ORDER — ROPIVACAINE HYDROCHLORIDE 5 MG/ML
30 INJECTION, SOLUTION EPIDURAL; INFILTRATION; PERINEURAL ONCE
Status: DISCONTINUED | OUTPATIENT
Start: 2019-10-30 | End: 2019-10-30 | Stop reason: HOSPADM

## 2019-10-30 RX ORDER — SODIUM CHLORIDE 0.9 % (FLUSH) 0.9 %
5-40 SYRINGE (ML) INJECTION AS NEEDED
Status: DISCONTINUED | OUTPATIENT
Start: 2019-10-30 | End: 2019-10-30 | Stop reason: HOSPADM

## 2019-10-30 RX ORDER — MIDAZOLAM HYDROCHLORIDE 1 MG/ML
0.5 INJECTION, SOLUTION INTRAMUSCULAR; INTRAVENOUS
Status: DISCONTINUED | OUTPATIENT
Start: 2019-10-30 | End: 2019-10-30 | Stop reason: HOSPADM

## 2019-10-30 RX ORDER — ACETAMINOPHEN 10 MG/ML
INJECTION, SOLUTION INTRAVENOUS AS NEEDED
Status: DISCONTINUED | OUTPATIENT
Start: 2019-10-30 | End: 2019-10-30 | Stop reason: HOSPADM

## 2019-10-30 RX ORDER — ONDANSETRON 2 MG/ML
INJECTION INTRAMUSCULAR; INTRAVENOUS AS NEEDED
Status: DISCONTINUED | OUTPATIENT
Start: 2019-10-30 | End: 2019-10-30 | Stop reason: HOSPADM

## 2019-10-30 RX ORDER — MIDAZOLAM HYDROCHLORIDE 1 MG/ML
INJECTION, SOLUTION INTRAMUSCULAR; INTRAVENOUS AS NEEDED
Status: DISCONTINUED | OUTPATIENT
Start: 2019-10-30 | End: 2019-10-30 | Stop reason: HOSPADM

## 2019-10-30 RX ORDER — FENTANYL CITRATE 50 UG/ML
25 INJECTION, SOLUTION INTRAMUSCULAR; INTRAVENOUS
Status: COMPLETED | OUTPATIENT
Start: 2019-10-30 | End: 2019-10-30

## 2019-10-30 RX ORDER — OXYCODONE HYDROCHLORIDE 5 MG/1
5 TABLET ORAL
Status: DISCONTINUED | OUTPATIENT
Start: 2019-10-30 | End: 2019-11-01 | Stop reason: HOSPADM

## 2019-10-30 RX ORDER — SODIUM CHLORIDE, SODIUM LACTATE, POTASSIUM CHLORIDE, CALCIUM CHLORIDE 600; 310; 30; 20 MG/100ML; MG/100ML; MG/100ML; MG/100ML
125 INJECTION, SOLUTION INTRAVENOUS CONTINUOUS
Status: DISCONTINUED | OUTPATIENT
Start: 2019-10-30 | End: 2019-10-30 | Stop reason: HOSPADM

## 2019-10-30 RX ORDER — HYDROMORPHONE HYDROCHLORIDE 1 MG/ML
0.2 INJECTION, SOLUTION INTRAMUSCULAR; INTRAVENOUS; SUBCUTANEOUS
Status: DISCONTINUED | OUTPATIENT
Start: 2019-10-30 | End: 2019-10-30 | Stop reason: HOSPADM

## 2019-10-30 RX ORDER — SODIUM CHLORIDE, SODIUM LACTATE, POTASSIUM CHLORIDE, CALCIUM CHLORIDE 600; 310; 30; 20 MG/100ML; MG/100ML; MG/100ML; MG/100ML
75 INJECTION, SOLUTION INTRAVENOUS CONTINUOUS
Status: DISCONTINUED | OUTPATIENT
Start: 2019-10-30 | End: 2019-10-30 | Stop reason: HOSPADM

## 2019-10-30 RX ORDER — ACETAMINOPHEN 500 MG
1000 TABLET ORAL ONCE
Status: DISCONTINUED | OUTPATIENT
Start: 2019-10-30 | End: 2019-10-30 | Stop reason: HOSPADM

## 2019-10-30 RX ORDER — PANTOPRAZOLE SODIUM 40 MG/1
40 TABLET, DELAYED RELEASE ORAL DAILY
Status: DISCONTINUED | OUTPATIENT
Start: 2019-10-31 | End: 2019-11-01 | Stop reason: HOSPADM

## 2019-10-30 RX ORDER — DEXMEDETOMIDINE HYDROCHLORIDE 100 UG/ML
INJECTION, SOLUTION INTRAVENOUS AS NEEDED
Status: DISCONTINUED | OUTPATIENT
Start: 2019-10-30 | End: 2019-10-30 | Stop reason: HOSPADM

## 2019-10-30 RX ORDER — PROPOFOL 10 MG/ML
INJECTION, EMULSION INTRAVENOUS AS NEEDED
Status: DISCONTINUED | OUTPATIENT
Start: 2019-10-30 | End: 2019-10-30 | Stop reason: HOSPADM

## 2019-10-30 RX ORDER — SODIUM CHLORIDE 0.9 % (FLUSH) 0.9 %
5-40 SYRINGE (ML) INJECTION EVERY 8 HOURS
Status: DISCONTINUED | OUTPATIENT
Start: 2019-10-30 | End: 2019-11-01 | Stop reason: HOSPADM

## 2019-10-30 RX ORDER — DEXTROSE MONOHYDRATE 100 MG/ML
0-250 INJECTION, SOLUTION INTRAVENOUS AS NEEDED
Status: DISCONTINUED | OUTPATIENT
Start: 2019-10-30 | End: 2019-11-01 | Stop reason: HOSPADM

## 2019-10-30 RX ORDER — OXYCODONE HYDROCHLORIDE 5 MG/1
2.5 TABLET ORAL
Status: DISCONTINUED | OUTPATIENT
Start: 2019-10-30 | End: 2019-11-01 | Stop reason: HOSPADM

## 2019-10-30 RX ORDER — DIPHENHYDRAMINE HYDROCHLORIDE 50 MG/ML
12.5 INJECTION, SOLUTION INTRAMUSCULAR; INTRAVENOUS AS NEEDED
Status: DISCONTINUED | OUTPATIENT
Start: 2019-10-30 | End: 2019-10-30 | Stop reason: HOSPADM

## 2019-10-30 RX ORDER — ROPIVACAINE HYDROCHLORIDE 5 MG/ML
INJECTION, SOLUTION EPIDURAL; INFILTRATION; PERINEURAL
Status: COMPLETED | OUTPATIENT
Start: 2019-10-30 | End: 2019-10-30

## 2019-10-30 RX ORDER — DEXAMETHASONE SODIUM PHOSPHATE 4 MG/ML
INJECTION, SOLUTION INTRA-ARTICULAR; INTRALESIONAL; INTRAMUSCULAR; INTRAVENOUS; SOFT TISSUE AS NEEDED
Status: DISCONTINUED | OUTPATIENT
Start: 2019-10-30 | End: 2019-10-30 | Stop reason: HOSPADM

## 2019-10-30 RX ORDER — MYCOPHENOLATE MOFETIL 250 MG/1
250 CAPSULE ORAL 2 TIMES DAILY
Status: DISCONTINUED | OUTPATIENT
Start: 2019-10-30 | End: 2019-11-01 | Stop reason: HOSPADM

## 2019-10-30 RX ORDER — TAMSULOSIN HYDROCHLORIDE 0.4 MG/1
0.4 CAPSULE ORAL
Status: DISCONTINUED | OUTPATIENT
Start: 2019-10-30 | End: 2019-10-31 | Stop reason: CLARIF

## 2019-10-30 RX ORDER — FENTANYL CITRATE 50 UG/ML
INJECTION, SOLUTION INTRAMUSCULAR; INTRAVENOUS AS NEEDED
Status: DISCONTINUED | OUTPATIENT
Start: 2019-10-30 | End: 2019-10-30 | Stop reason: HOSPADM

## 2019-10-30 RX ORDER — MORPHINE SULFATE 10 MG/ML
2 INJECTION, SOLUTION INTRAMUSCULAR; INTRAVENOUS
Status: DISCONTINUED | OUTPATIENT
Start: 2019-10-30 | End: 2019-10-30 | Stop reason: HOSPADM

## 2019-10-30 RX ORDER — ONDANSETRON 2 MG/ML
4 INJECTION INTRAMUSCULAR; INTRAVENOUS AS NEEDED
Status: DISCONTINUED | OUTPATIENT
Start: 2019-10-30 | End: 2019-10-30 | Stop reason: HOSPADM

## 2019-10-30 RX ORDER — PHENYLEPHRINE HCL IN 0.9% NACL 0.4MG/10ML
SYRINGE (ML) INTRAVENOUS AS NEEDED
Status: DISCONTINUED | OUTPATIENT
Start: 2019-10-30 | End: 2019-10-30 | Stop reason: HOSPADM

## 2019-10-30 RX ORDER — VANCOMYCIN/0.9 % SOD CHLORIDE 1.5G/250ML
1500 PLASTIC BAG, INJECTION (ML) INTRAVENOUS ONCE
Status: COMPLETED | OUTPATIENT
Start: 2019-10-30 | End: 2019-10-30

## 2019-10-30 RX ORDER — AMOXICILLIN 250 MG
1 CAPSULE ORAL 2 TIMES DAILY
Status: DISCONTINUED | OUTPATIENT
Start: 2019-10-30 | End: 2019-11-01 | Stop reason: HOSPADM

## 2019-10-30 RX ORDER — LANOLIN ALCOHOL/MO/W.PET/CERES
9 CREAM (GRAM) TOPICAL
Status: DISCONTINUED | OUTPATIENT
Start: 2019-10-30 | End: 2019-11-01 | Stop reason: HOSPADM

## 2019-10-30 RX ORDER — NALOXONE HYDROCHLORIDE 0.4 MG/ML
0.4 INJECTION, SOLUTION INTRAMUSCULAR; INTRAVENOUS; SUBCUTANEOUS AS NEEDED
Status: DISCONTINUED | OUTPATIENT
Start: 2019-10-30 | End: 2019-11-01 | Stop reason: HOSPADM

## 2019-10-30 RX ORDER — FACIAL-BODY WIPES
10 EACH TOPICAL DAILY PRN
Status: DISCONTINUED | OUTPATIENT
Start: 2019-11-01 | End: 2019-11-01 | Stop reason: HOSPADM

## 2019-10-30 RX ORDER — TACROLIMUS 1 MG/1
2 CAPSULE ORAL EVERY 12 HOURS
Status: DISCONTINUED | OUTPATIENT
Start: 2019-10-30 | End: 2019-11-01 | Stop reason: HOSPADM

## 2019-10-30 RX ORDER — LIDOCAINE HYDROCHLORIDE 10 MG/ML
0.1 INJECTION, SOLUTION EPIDURAL; INFILTRATION; INTRACAUDAL; PERINEURAL AS NEEDED
Status: DISCONTINUED | OUTPATIENT
Start: 2019-10-30 | End: 2019-10-30 | Stop reason: HOSPADM

## 2019-10-30 RX ADMIN — ONDANSETRON HYDROCHLORIDE 4 MG: 2 INJECTION, SOLUTION INTRAMUSCULAR; INTRAVENOUS at 15:00

## 2019-10-30 RX ADMIN — METOPROLOL TARTRATE 12.5 MG: 25 TABLET ORAL at 19:38

## 2019-10-30 RX ADMIN — FENTANYL CITRATE 50 MCG: 50 INJECTION, SOLUTION INTRAMUSCULAR; INTRAVENOUS at 15:38

## 2019-10-30 RX ADMIN — SODIUM CHLORIDE, SODIUM LACTATE, POTASSIUM CHLORIDE, AND CALCIUM CHLORIDE: 600; 310; 30; 20 INJECTION, SOLUTION INTRAVENOUS at 15:22

## 2019-10-30 RX ADMIN — ACETAMINOPHEN 650 MG: 325 TABLET, FILM COATED ORAL at 12:16

## 2019-10-30 RX ADMIN — Medication 80 MCG: at 13:53

## 2019-10-30 RX ADMIN — FENTANYL CITRATE 25 MCG: 50 INJECTION, SOLUTION INTRAMUSCULAR; INTRAVENOUS at 14:06

## 2019-10-30 RX ADMIN — Medication 80 MCG: at 14:12

## 2019-10-30 RX ADMIN — DEXAMETHASONE SODIUM PHOSPHATE 4 MG: 4 INJECTION, SOLUTION INTRAMUSCULAR; INTRAVENOUS at 13:57

## 2019-10-30 RX ADMIN — DEXMEDETOMIDINE HYDROCHLORIDE 10 MCG: 100 INJECTION, SOLUTION, CONCENTRATE INTRAVENOUS at 13:44

## 2019-10-30 RX ADMIN — FENTANYL CITRATE 25 MCG: 50 INJECTION, SOLUTION INTRAMUSCULAR; INTRAVENOUS at 16:58

## 2019-10-30 RX ADMIN — MAGNESIUM OXIDE TAB 400 MG (241.3 MG ELEMENTAL MG) 200 MG: 400 (241.3 MG) TAB at 22:27

## 2019-10-30 RX ADMIN — FENTANYL CITRATE 100 MCG: 50 INJECTION, SOLUTION INTRAMUSCULAR; INTRAVENOUS at 12:27

## 2019-10-30 RX ADMIN — LIDOCAINE HYDROCHLORIDE 100 MG: 20 INJECTION, SOLUTION EPIDURAL; INFILTRATION; INTRACAUDAL; PERINEURAL at 13:44

## 2019-10-30 RX ADMIN — WARFARIN SODIUM 10 MG: 10 TABLET ORAL at 22:26

## 2019-10-30 RX ADMIN — INSULIN LISPRO 2 UNITS: 100 INJECTION, SOLUTION INTRAVENOUS; SUBCUTANEOUS at 16:58

## 2019-10-30 RX ADMIN — MELATONIN 9 MG: at 22:26

## 2019-10-30 RX ADMIN — MYCOPHENOLATE MOFETIL 250 MG: 250 CAPSULE ORAL at 19:38

## 2019-10-30 RX ADMIN — SODIUM CHLORIDE 125 ML/HR: 900 INJECTION, SOLUTION INTRAVENOUS at 16:09

## 2019-10-30 RX ADMIN — MIDAZOLAM 2 MG: 1 INJECTION INTRAMUSCULAR; INTRAVENOUS at 12:27

## 2019-10-30 RX ADMIN — FENTANYL CITRATE 25 MCG: 50 INJECTION, SOLUTION INTRAMUSCULAR; INTRAVENOUS at 14:07

## 2019-10-30 RX ADMIN — PROPOFOL 200 MG: 10 INJECTION, EMULSION INTRAVENOUS at 13:44

## 2019-10-30 RX ADMIN — FENTANYL CITRATE 25 MCG: 50 INJECTION, SOLUTION INTRAMUSCULAR; INTRAVENOUS at 16:36

## 2019-10-30 RX ADMIN — VANCOMYCIN HYDROCHLORIDE 1500 MG: 10 INJECTION, POWDER, LYOPHILIZED, FOR SOLUTION INTRAVENOUS at 12:16

## 2019-10-30 RX ADMIN — Medication 80 MCG: at 14:04

## 2019-10-30 RX ADMIN — SODIUM CHLORIDE, SODIUM LACTATE, POTASSIUM CHLORIDE, AND CALCIUM CHLORIDE 125 ML/HR: 600; 310; 30; 20 INJECTION, SOLUTION INTRAVENOUS at 12:05

## 2019-10-30 RX ADMIN — FENTANYL CITRATE 50 MCG: 50 INJECTION, SOLUTION INTRAMUSCULAR; INTRAVENOUS at 14:31

## 2019-10-30 RX ADMIN — DAPTOMYCIN 600 MG: 500 INJECTION, POWDER, LYOPHILIZED, FOR SOLUTION INTRAVENOUS at 22:26

## 2019-10-30 RX ADMIN — FENTANYL CITRATE 25 MCG: 50 INJECTION, SOLUTION INTRAMUSCULAR; INTRAVENOUS at 16:50

## 2019-10-30 RX ADMIN — MIDAZOLAM 2 MG: 1 INJECTION INTRAMUSCULAR; INTRAVENOUS at 13:41

## 2019-10-30 RX ADMIN — ROPIVACAINE HYDROCHLORIDE 10 ML: 5 INJECTION, SOLUTION EPIDURAL; INFILTRATION; PERINEURAL at 12:41

## 2019-10-30 RX ADMIN — FENTANYL CITRATE 25 MCG: 50 INJECTION, SOLUTION INTRAMUSCULAR; INTRAVENOUS at 15:26

## 2019-10-30 RX ADMIN — INSULIN LISPRO 4 UNITS: 100 INJECTION, SOLUTION INTRAVENOUS; SUBCUTANEOUS at 22:26

## 2019-10-30 RX ADMIN — ACETAMINOPHEN 1000 MG: 10 INJECTION, SOLUTION INTRAVENOUS at 15:30

## 2019-10-30 RX ADMIN — FENTANYL CITRATE 25 MCG: 50 INJECTION, SOLUTION INTRAMUSCULAR; INTRAVENOUS at 15:10

## 2019-10-30 RX ADMIN — TACROLIMUS 2 MG: 1 CAPSULE ORAL at 22:26

## 2019-10-30 RX ADMIN — Medication 80 MCG: at 14:15

## 2019-10-30 RX ADMIN — FENTANYL CITRATE 25 MCG: 50 INJECTION, SOLUTION INTRAMUSCULAR; INTRAVENOUS at 16:42

## 2019-10-30 NOTE — ANESTHESIA PREPROCEDURE EVALUATION
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

## 2019-10-30 NOTE — H&P
Date of Surgery Update:  Isabela Magdaleno III was seen and examined. History and physical has been reviewed. The patient has been examined.  There have been no significant clinical changes since the completion of the originally dated History and Physical.    Signed By: DARION Blanchard     October 30, 2019 11:06 AM

## 2019-10-30 NOTE — BRIEF OP NOTE
BRIEF OPERATIVE NOTE    Date of Procedure: 10/30/2019   Preoperative Diagnosis: INFECTION RIGHT KNEE PROSTHESIS  Postoperative Diagnosis: INFECTION RIGHT KNEE PROSTHESIS    Procedure(s):  RIGHT KNEE REVISION, 1 COMPONENT  Surgeon(s) and Role:     * Boubacar Lemos MD - Primary         Surgical Assistant: Mark Hernandez    Surgical Staff:  Circ-1: Maximilian Paiz RN  Physician Assistant: DARION Watson  Scrub Tech-1: Sukumar LORENZANA  Scrub Tech-2: Glendy Lopez  Surg Asst-1: Leonard Chanel  Event Time In Time Out   Incision Start 1406    Incision Close       Anesthesia: Spinal   Estimated Blood Loss: minimal  Specimens:   ID Type Source Tests Collected by Time Destination   1 : RIGHT KNEE JOINT Joint Fluid Joint, Knee AEROBIC/ANAEROBIC CULTURE Boubacar Lemos MD 10/30/2019 1413 Microbiology      Findings: No gross evidence of purulence. Cloudy effusion   Complications: none  Implants:   Implant Name Type Inv.  Item Serial No.  Lot No. LRB No. Used Action   INSERT TIB CCK LPS G 5-6 20MM -- NEXGEN ARTC SURF GRN STRIPE - SN/A Joint Component INSERT TIB CCK LPS G 5-6 20MM -- [employer]EX[employer] ARTC SURF GRN STRIPE N[facility]A [facility] [id number] Right 1 Implanted

## 2019-10-30 NOTE — PROGRESS NOTES
TRANSFER - IN REPORT:    Verbal report received from Viola RN(name) on [employer]  being received from [facility]) for routine post - op      Report consisted of patients Situation, Background, Assessment and   Recommendations(SBAR). Information from the following report(s) SBAR, Kardex, Intake/Output, MAR and Accordion was reviewed with the receiving nurse. Opportunity for questions and clarification was provided. Assessment completed upon patients arrival to unit and care assumed.

## 2019-10-30 NOTE — PERIOP NOTES
TRANSFER - OUT REPORT:    Verbal report given to Sharyn Osgood, RN on [employer]  being transferred to [phone] for routine post - op       Report consisted of patients Situation, Background, Assessment and   Recommendations(SBAR). Time Pre op antibiotic given:1216  Anesthesia Stop time: 6536  Echavarria Present on Transfer to floor:no  Order for Echavarria on Chart:no  Discharge Prescriptions with Chart:no    Information from the following report(s) SBAR, OR Summary, Intake/Output, MAR and Recent Results was reviewed with the receiving nurse. Opportunity for questions and clarification was provided. Is the patient on 02? YES       L/Min 1       Other -    Is the patient on a monitor? NO    Is the nurse transporting with the patient? NO    Surgical Waiting Area notified of patient's transfer from PACU?  YES      The following personal items collected during your admission accompanied patient upon transfer:   Dental Appliance:    Vision: Visual Aid: Glasses(to pacu)  Hearing Aid:    Jewelry:    Clothing: Clothing: (clothes returned; glasses & hearing aids in )  Other Valuables:    Valuables sent to safe:

## 2019-10-30 NOTE — CONSULTS
Infectious Disease Consult    Today's Date: 10/30/2019   Admit Date: 10/30/2019    Impression:   · Clinically infected right TKR--status post washout and poly exchange  · History of coag negative staph infection of the same knee 3 years ago--treated with 2 stage joint replacement and IV antibiotic therapy  · Significant immunosuppression for liver/kidney transplant 1 year ago    Plan:   · IV antibiotic therapy  · Will use daptomycin in face of transplant and marginal renal function    Anti-infectives:   · See orders    Subjective:   Date of Consultation:  October 30, 2019  Referring Physician: Dr Grabiel Davison    Patient is a 71 y.o. male admitted with right total knee infection. He has history of the same about 3 years ago and was managed with knee replacement and IV antibiotic therapy. He states that he has been doing well, but the knee started to become painful and swollen about 2 months ago. He has not been on antibiotic therapy. He is immunocompromised as the result of a liver/kidney transplant about a year ago. We have been asked to see him in consultation after knee washout and poly exchange.      Patient Active Problem List   Diagnosis Code    Osteoarthritis of knee M17.10    Infection of total knee replacement (HCC) T84.59XA, Z96.659    Thromboembolus ([address] I74.9    Spider angioma I78.1    Rosacea L71.9    CAMPOVERDE (nonalcoholic steatohepatitis) K75.81    Lymphedema I89.0    Iron deficiency anemia D50.9    GERD (gastroesophageal reflux disease) K21.9    Gastric varices I86.4    Esophageal tear S11.21XA    Diabetes (HCC) E11.9    Cirrhosis of liver (HCC) K74.60    Chronic pain G89.29    Chronic cough R05    Atrial tachycardia (HCC) I47.1    Arthritis M19.90    Arrhythmia I49.9    Anesthesia complication T31.18UQ    Infected prosthetic knee joint (HCC) T84.59XA, Z96.659    Failed total right knee replacement (HCC) T84.012A     Past Medical History:   Diagnosis Date    Acute kidney failure with tubular necrosis ([facility] 2017    DR Edith Anthony    Adverse effect of anesthesia     violent with anesthesia in 1969    Anesthesia complication 5786    violent after anesthesia     Arrhythmia     A-FIB    Arthritis     Atrial tachycardia (HCC)     DR HENRY-NO LONGER SEES THIS [address] [place] Cataract     LEFT    Chronic cough 2014-present    Chronic kidney disease     elevated creatine level    Chronic pain     Cirrhosis of liver (HCC)     CKD (chronic kidney disease) stage 3, GFR 30-59 ml/min ([facility]     DR Edith Anthony    Diabetes ([facility]     Encephalopathy     10/2018- RELATED TO LIVER FAILURE    Esophageal tear 2011    Gastric varices 2016    Dr. Philip Garcia    GERD (gastroesophageal reflux disease) 2007    TORE ESOPHAGUS    Gouty arthropathy     Iron deficiency anemia     RECEIVED IRON INFUSIONS     Lymphedema     RLL    CAMPOVERDE (nonalcoholic steatohepatitis) 2016    Dr. Wilfredo Hall Spider angioma 2016    upper torso- Dr. Cristela Mckeon [facility]) 1980, 1995, 2018    RLE, LLE, RIGHT LEG    Umbilical hernia 5104      Family History   Problem Relation Age of Onset    Stroke Mother     Diabetes Mother     Cancer Mother         UNKNOWN    Arthritis-osteo Father     Bleeding Prob Father         DVT    Diabetes Father     Heart Attack Father     Heart Disease Father     Thyroid Disease Sister     No Known Problems Brother     Obesity Son     Bleeding Prob Son 28        DVT     Other Son         SYNCOPE D/T UNKNOWN NERVE DISORDER    No Known Problems Daughter     Anesth Problems Neg Hx       Social History     Tobacco Use    Smoking status: Never Smoker    Smokeless tobacco: Former User   Substance Use Topics    Alcohol use: No     Alcohol/week: 3.3 standard drinks     Types: 4 Standard drinks or equivalent per week     Comment: NO ETOH IN LAST YEAR, 1 DAILY PRIOR     Past Surgical History:   Procedure Laterality Date    CARDIAC SURG PROCEDURE UNLIST      ABLATION    HX GI 2007    EGD X3 FOR ESOPHAGUS REPAIR    HX HERNIA REPAIR Right 2556,9300    X2    HX KNEE REPLACEMENT Right 2011    HX KNEE REPLACEMENT Right 10/31/2016    REVISION WITH SPACER    HX ORTHOPAEDIC  2010    LEFT KNEE ARTHROSCOPY    HX ORTHOPAEDIC  2011    RT WRIST, ELBOW CARTILIDGE REPAIR    HX ORTHOPAEDIC      RIGHT KNEE SURGERY  X4    HX OTHER SURGICAL Bilateral     TEAR DUCTS    HX OTHER SURGICAL Right 1978    fatty tumor arm    HX TONSILLECTOMY      HX TRANSPLANT  11/14/2018    LIVER, KIDNEY     HX VASCULAR ACCESS      LEFT ARM FISTULA      Prior to Admission medications    Medication Sig Start Date End Date Taking? Authorizing Provider   pantoprazole (PROTONIX) 40 mg tablet Take 40 mg by mouth daily. Yes Provider, Historical   tacrolimus (PROGRAF) 1 mg capsule Take 2 mg by mouth every twelve (12) hours. Yes Provider, Historical   magnesium chloride (SLOW-MAG) 71.5 mg tablet Take 143 mg by mouth two (2) times a day. Yes Provider, Historical   predniSONE (DELTASONE) 1 mg tablet Take 5 mg by mouth daily (with breakfast). Yes Provider, Historical   metFORMIN (GLUCOPHAGE) 500 mg tablet Take 1,000 mg by mouth two (2) times daily (with meals). Yes Provider, Historical   tamsulosin (FLOMAX) 0.4 mg capsule Take 0.4 mg by mouth nightly. Yes Provider, Historical   mycophenolate mofetil (CELLCEPT) 250 mg capsule Take 250 mg by mouth two (2) times a day. Yes Provider, Historical   simvastatin (ZOCOR) 10 mg tablet TAKE ONE TABLET BY MOUTH DAILY  Patient taking differently: nightly. 12/12/17  Yes Hallie Cote MD   metoprolol tartrate (LOPRESSOR) 25 mg tablet Take 12.5 mg by mouth two (2) times a day. Yes Provider, Historical   melatonin 5 mg cap capsule Take 10 mg by mouth nightly. Provider, Historical   warfarin (COUMADIN) 5 mg tablet Take one on M, W, F,  Take 1/2 of a tablet on Tuesday. , Thursday, and Saturday;  and none on Sunday.   ( note:  no coumadin today 5/22)  Patient taking differently: Take 10 mg by mouth nightly. 17   Vee Isbell MD       Allergies   Allergen Reactions    Pcn [Penicillins] Anaphylaxis and Shortness of Breath    Atropine Other (comments)     UNSURE OF REACTION, 50 YRS AGO        Review of Systems:  A comprehensive review of systems was negative. Objective:     Visit Vitals  /66   Pulse 67   Temp 97.1 °F (36.2 °C)   Resp 16   Ht 6' 2\" (1.88 m)   Wt 95.3 kg (210 lb)   SpO2 100%   BMI 26.96 kg/m²     Temp (24hrs), Av.5 °F (36.4 °C), Min:97.1 °F (36.2 °C), Max:97.8 °F (36.6 °C)       Lines:  Peripheral IV:       Physical Exam:  Lungs:  clear to auscultation bilaterally  Heart:  regular rate and rhythm  Abdomen:  soft, non-tender. Bowel sounds normal. No masses,  no organomegaly  Knee is dressed    Data Review:     CBC:  No results for input(s): WBC, GRANS, MONOS, EOS, ANEU, ABL, HGB, HCT, PLT, HGBEXT, HCTEXT, PLTEXT in the last 72 hours. No lab exists for component: LYMPHS,  WILD    BMP:  No results for input(s): CREA, BUN, NA, K, CL, CO2, AGAP, GLU in the last 72 hours. LFTS:  No results for input(s): TBILI, ALT, SGOT, AP, TP, ALB in the last 72 hours.     Microbiology:     All Micro Results     Procedure Component Value Units Date/Time    CULTURE, BODY FLUID W Saranya Olaf [[id number]] Collected:  10/30/19 1413    Order Status:  Completed Updated:  10/30/19 1537    CULTURE, ANAEROBIC [[id number]] Collected:  10/30/19 1413    Order Status:  Completed Updated:  10/30/19 1537          Imaging:   None     Signed By: Josefina Artis MD     2019

## 2019-10-30 NOTE — PROGRESS NOTES
Pharmacist Note  Re: vancomycin consult     Pilar Serrano consulted pharmacy to dose vancomycin for prosthetic knee infection. Daptomycin has already been ordered by Steph Mack (Infectious Diseases) due to history of immunosuppression and kidney transplant with marginal kidney function. I cancelled the vancomycin consult and contacted Dr. Al Clemens via [employer].

## 2019-10-30 NOTE — H&P
Date of Surgery Update:  Radha Lui III was seen and examined. History and physical has been reviewed. The patient has been examined.  There have been no significant clinical changes since the completion of the originally dated History and Physical.    Signed By: Dollie Cogan, MD     October 30, 2019 12:58 PM

## 2019-10-30 NOTE — ANESTHESIA PROCEDURE NOTES
Peripheral Block    Start time: 10/30/2019 12:30 PM  End time: 10/30/2019 12:35 PM  Performed by: Camille Pereira MD  Authorized by: Camille Pereira MD       Pre-procedure: Indications: at surgeon's request and post-op pain management    Preanesthetic Checklist: patient identified, risks and benefits discussed, site marked, timeout performed and patient being monitored    Timeout Time: 12:30          Block Type:   Block Type:   Adductor canal  Monitoring:  Standard ASA monitoring, continuous pulse ox, frequent vital sign checks, heart rate, responsive to questions and oxygen  Injection Technique:  Single shot  Procedures: ultrasound guided    Patient Position: supine  Prep: DuraPrep    Needle Type:  Stimuplex  Needle Gauge:  22 G  Needle Localization:  Ultrasound guidance    Assessment:  Number of attempts:  1  Injection Assessment:  Incremental injection every 5 mL, local visualized surrounding nerve on ultrasound, negative aspiration for blood, no paresthesia and no intravascular symptoms  Patient tolerance:  Patient tolerated the procedure well with no immediate complications

## 2019-10-30 NOTE — ANESTHESIA POSTPROCEDURE EVALUATION
Post-Anesthesia Evaluation and Assessment    Patient: Jasmyne Holguin MRN: [id number]  SSN: [id number]    YOB: 1955  Age: 71 y.o. Sex: male      I have evaluated the patient and they are stable and ready for discharge from the PACU. Cardiovascular Function/Vital Signs  Visit Vitals  /63   Pulse 63   Temp 36.2 °C (97.1 °F)   Resp 15   Ht 6' 2\" (1.88 m)   Wt 95.3 kg (210 lb)   SpO2 98%   BMI 26.96 kg/m²       Patient is status post Spinal anesthesia for Procedure(s):  RIGHT KNEE REVISION, 1 COMPONENT. Nausea/Vomiting: None    Postoperative hydration reviewed and adequate. Pain:  Pain Scale 1: Numeric (0 - 10) (10/30/19 1540)  Pain Intensity 1: 0 (10/30/19 1540)   Managed    Neurological Status:   Neuro (WDL): Exceptions to WDL (10/30/19 1540)  Neuro  Neurologic State: Drowsy (10/30/19 1540)  LUE Motor Response: Purposeful (10/30/19 1540)  LLE Motor Response: Purposeful (10/30/19 1540)  RUE Motor Response: Purposeful (10/30/19 1540)  RLE Motor Response: Purposeful (10/30/19 1540)   At baseline    Mental Status, Level of Consciousness: Alert and  oriented to person, place, and time    Pulmonary Status:   O2 Device: CO2 nasal cannula (10/30/19 1541)   Adequate oxygenation and airway patent    Complications related to anesthesia: None    Post-anesthesia assessment completed. No concerns    Signed By: Steve Krueger MD     October 30, 2019              Procedure(s):  RIGHT KNEE REVISION, 1 COMPONENT. general    <BSHSIANPOST>    Vitals Value Taken Time   /63 10/30/2019  4:30 PM   Temp 36.2 °C (97.1 °F) 10/30/2019  3:41 PM   Pulse 61 10/30/2019  4:45 PM   Resp 14 10/30/2019  4:45 PM   SpO2 100 % 10/30/2019  4:45 PM   Vitals shown include unvalidated device data.

## 2019-10-30 NOTE — PERIOP NOTES
Sterile Betadine solution used by Dr. Kaila Easton in patient's right knee joint. 1000mL Bactisure wound lavage solution used for irrigation in patient's right. Exp: 10/14/2020  Lot: [id number].

## 2019-10-30 NOTE — OP NOTES
[facility]  OPERATIVE REPORT    Name:  Akash Stroud  MR#:  [id number]  :  1955  ACCOUNT #:  [de-identified]  DATE OF SERVICE:  10/30/2019      PREOPERATIVE DIAGNOSIS:  Infection, right knee prosthesis. POSTOPERATIVE DIAGNOSIS:  Infection, right knee prosthesis. PROCEDURES PERFORMED:  Irrigation, debridement, synovectomy and tibial component exchange. SURGEON:  Jenifer Loya MD    ASSISTANT:  DARION Lindsay    ANESTHESIA:  General.    COMPLICATIONS:  None. SPECIMENS REMOVED:  Additional cultures were sent from the knee joint. IMPLANTS:  Reimplanted 20 mm LCCK tibial component. ESTIMATED BLOOD LOSS:  Minimal.    COUNTS:  Sponge, instrument and needle count were correct at the end of the procedure. INDICATIONS:  This is a 68-year-old man. He has multiple medical issues. He is status post liver and kidney transplant. Three years ago, he underwent revision of his right knee prosthesis. He did have prior knee infection. For 3 years, he functioned well. About a month ago, he was at the gym, did a vigorous workout and noticed increased pain and swelling. He presented to the office. Aspiration was performed and that aspiration showed elevated white blood cell count in the fluid, but negative cultures. He had a repeat aspiration, which again showed the same exact result. A third aspiration was performed and sent for an Alpha Defensin assay. This came back positive. We discussed options. The patient exhibited no constitutional symptoms. He had no peripheral evidence of infection or systemic evidence of infection. At this point, due to his multiple medical issues, options were presented to him regarding irrigation and debridement, long-term antibiotic suppression verses removal of the implant. The patient elected to proceed with irrigation, debridement and long-term suppression.   He does understand that this may fail and require removal of the implant at a later date. PROCEDURE:  Anesthetic was initiated. Preoperative dose of antibiotic was given. The right side was confirmed as the operative site, prepped and draped in the usual sterile fashion. Limb was exsanguinated only by elevation and the tourniquet was inflated. Using his old incision, a medial parapatellar approach was made to the patient's knee. The fluid again was cloudy, but there was no evidence of gross purulence. He did have inflamed appearing synovium. A complete synovectomy was performed medial, lateral and suprapatellar. The knee was flexed, patella was subluxed, and the polyethylene tibial component was removed. The metal surfaces were then scrubbed with Betadine scrub and copious irrigation of the metal surfaces was performed. The knee was then irrigated with Bactisure. After this irrigation, a final irrigation was made with saline and bacitracin solution. After all devitalized tissue had been removed, the tibial component was implanted, it was a 20 mm LCCK tibial component. Final irrigation. The arthrotomy was then closed with absorbable suture. Skin and subcu were irrigated and closed in standard fashion. Sterile dressing was applied. Tourniquet was released. Procedure was irrigation, debridement, synovectomy and tibial component revision. Hernesto Trejo assisted for the procedure. The patient tolerated the procedure well without event. Cultures were sent from the knee joint. He was awakened from anesthetic and taken to the recovery room stable.         Augustine Mansfield MD [email or web address]  D:  10/30/2019 14:48  T:  10/30/2019 18:23  JOB #:  [id number]

## 2019-10-31 LAB
ANION GAP SERPL CALC-SCNC: 5 MMOL/L (ref 5–15)
BUN SERPL-MCNC: 23 MG/DL (ref 6–20)
BUN/CREAT SERPL: 22 (ref 12–20)
CALCIUM SERPL-MCNC: 8.1 MG/DL (ref 8.5–10.1)
CHLORIDE SERPL-SCNC: 108 MMOL/L (ref 97–108)
CK SERPL-CCNC: 63 U/L (ref 39–308)
CO2 SERPL-SCNC: 25 MMOL/L (ref 21–32)
CREAT SERPL-MCNC: 1.04 MG/DL (ref 0.7–1.3)
GLUCOSE BLD STRIP.AUTO-MCNC: 169 MG/DL (ref 65–100)
GLUCOSE BLD STRIP.AUTO-MCNC: 186 MG/DL (ref 65–100)
GLUCOSE BLD STRIP.AUTO-MCNC: 195 MG/DL (ref 65–100)
GLUCOSE BLD STRIP.AUTO-MCNC: 244 MG/DL (ref 65–100)
GLUCOSE SERPL-MCNC: 212 MG/DL (ref 65–100)
HGB BLD-MCNC: 10.5 G/DL (ref 12.1–17)
INR PPP: 1.2 (ref 0.9–1.1)
POTASSIUM SERPL-SCNC: 4.8 MMOL/L (ref 3.5–5.1)
PROTHROMBIN TIME: 12.3 SEC (ref 9–11.1)
SERVICE CMNT-IMP: ABNORMAL
SODIUM SERPL-SCNC: 138 MMOL/L (ref 136–145)

## 2019-10-31 PROCEDURE — 74011250636 HC RX REV CODE- 250/636: Performed by: PHYSICIAN ASSISTANT

## 2019-10-31 PROCEDURE — 65270000029 HC RM PRIVATE

## 2019-10-31 PROCEDURE — 77030028907 HC WRP KNEE WO BGS SOLM -B

## 2019-10-31 PROCEDURE — 97530 THERAPEUTIC ACTIVITIES: CPT | Performed by: PHYSICAL THERAPIST

## 2019-10-31 PROCEDURE — 85018 HEMOGLOBIN: CPT

## 2019-10-31 PROCEDURE — 74011000258 HC RX REV CODE- 258: Performed by: INTERNAL MEDICINE

## 2019-10-31 PROCEDURE — 74011250637 HC RX REV CODE- 250/637: Performed by: PHYSICIAN ASSISTANT

## 2019-10-31 PROCEDURE — 97116 GAIT TRAINING THERAPY: CPT | Performed by: PHYSICAL THERAPIST

## 2019-10-31 PROCEDURE — 85610 PROTHROMBIN TIME: CPT

## 2019-10-31 PROCEDURE — 82550 ASSAY OF CK (CPK): CPT

## 2019-10-31 PROCEDURE — 74011636637 HC RX REV CODE- 636/637: Performed by: PHYSICIAN ASSISTANT

## 2019-10-31 PROCEDURE — 82962 GLUCOSE BLOOD TEST: CPT

## 2019-10-31 PROCEDURE — 97161 PT EVAL LOW COMPLEX 20 MIN: CPT | Performed by: PHYSICAL THERAPIST

## 2019-10-31 PROCEDURE — 74011250636 HC RX REV CODE- 250/636: Performed by: INTERNAL MEDICINE

## 2019-10-31 PROCEDURE — 80048 BASIC METABOLIC PNL TOTAL CA: CPT

## 2019-10-31 PROCEDURE — 36415 COLL VENOUS BLD VENIPUNCTURE: CPT

## 2019-10-31 RX ORDER — TAMSULOSIN HYDROCHLORIDE 0.4 MG/1
0.4 CAPSULE ORAL DAILY
Status: DISCONTINUED | OUTPATIENT
Start: 2019-10-31 | End: 2019-11-01 | Stop reason: HOSPADM

## 2019-10-31 RX ADMIN — MELATONIN 9 MG: at 21:15

## 2019-10-31 RX ADMIN — Medication 10 ML: at 18:16

## 2019-10-31 RX ADMIN — INSULIN LISPRO 2 UNITS: 100 INJECTION, SOLUTION INTRAVENOUS; SUBCUTANEOUS at 22:41

## 2019-10-31 RX ADMIN — PANTOPRAZOLE SODIUM 40 MG: 40 TABLET, DELAYED RELEASE ORAL at 08:38

## 2019-10-31 RX ADMIN — TACROLIMUS 2 MG: 1 CAPSULE ORAL at 21:15

## 2019-10-31 RX ADMIN — WARFARIN SODIUM 10 MG: 10 TABLET ORAL at 21:15

## 2019-10-31 RX ADMIN — INSULIN LISPRO 2 UNITS: 100 INJECTION, SOLUTION INTRAVENOUS; SUBCUTANEOUS at 17:59

## 2019-10-31 RX ADMIN — METOPROLOL TARTRATE 12.5 MG: 25 TABLET ORAL at 18:04

## 2019-10-31 RX ADMIN — OXYCODONE HYDROCHLORIDE 5 MG: 5 TABLET ORAL at 18:00

## 2019-10-31 RX ADMIN — METOPROLOL TARTRATE 12.5 MG: 25 TABLET ORAL at 08:47

## 2019-10-31 RX ADMIN — INSULIN LISPRO 2 UNITS: 100 INJECTION, SOLUTION INTRAVENOUS; SUBCUTANEOUS at 11:46

## 2019-10-31 RX ADMIN — MAGNESIUM OXIDE TAB 400 MG (241.3 MG ELEMENTAL MG) 200 MG: 400 (241.3 MG) TAB at 18:01

## 2019-10-31 RX ADMIN — SENNOSIDES, DOCUSATE SODIUM 1 TABLET: 50; 8.6 TABLET, FILM COATED ORAL at 08:38

## 2019-10-31 RX ADMIN — MYCOPHENOLATE MOFETIL 250 MG: 250 CAPSULE ORAL at 08:38

## 2019-10-31 RX ADMIN — TACROLIMUS 2 MG: 1 CAPSULE ORAL at 08:44

## 2019-10-31 RX ADMIN — DAPTOMYCIN 600 MG: 500 INJECTION, POWDER, LYOPHILIZED, FOR SOLUTION INTRAVENOUS at 22:37

## 2019-10-31 RX ADMIN — TAMSULOSIN HYDROCHLORIDE 0.4 MG: 0.4 CAPSULE ORAL at 08:38

## 2019-10-31 RX ADMIN — INSULIN LISPRO 2 UNITS: 100 INJECTION, SOLUTION INTRAVENOUS; SUBCUTANEOUS at 07:14

## 2019-10-31 RX ADMIN — MAGNESIUM OXIDE TAB 400 MG (241.3 MG ELEMENTAL MG) 200 MG: 400 (241.3 MG) TAB at 08:37

## 2019-10-31 RX ADMIN — OXYCODONE HYDROCHLORIDE 2.5 MG: 5 TABLET ORAL at 02:46

## 2019-10-31 RX ADMIN — PREDNISONE 5 MG: 5 TABLET ORAL at 08:37

## 2019-10-31 RX ADMIN — MYCOPHENOLATE MOFETIL 250 MG: 250 CAPSULE ORAL at 18:15

## 2019-10-31 RX ADMIN — OXYCODONE HYDROCHLORIDE 5 MG: 5 TABLET ORAL at 12:30

## 2019-10-31 NOTE — PROGRESS NOTES
Complaints: none  Events: none      GEN:  NAD. AOx3   ABD:  S/NT/ND   RLE:  Dressing C/D/I    5/5 motor    Calf nttp (Bilat)    Sensate all distribution to light touch    1+ dp/pt pulses, foot perfused      Lab Results   Component Value Date/Time    HGB 10.5 (L) 10/31/2019 02:38 AM    INR 1.2 (H) 10/31/2019 02:38 AM       Lab Results   Component Value Date/Time    Sodium 138 10/31/2019 02:38 AM    Potassium 4.8 10/31/2019 02:38 AM    Chloride 108 10/31/2019 02:38 AM    CO2 25 10/31/2019 02:38 AM    BUN 23 (H) 10/31/2019 02:38 AM    Creatinine 1.04 10/31/2019 02:38 AM    Calcium 8.1 (L) 10/31/2019 02:38 AM    Magnesium 1.5 (L) 05/20/2017 03:39 AM    Phosphorus 3.2 05/22/2017 07:21 AM            POD #1 RIGHT TOTAL KNEE I&D/poly exchange. Satisfactory progress. ABX: Per infectious diseased, pt will require PICC line prior to discharge  DVT Prophylaxis: Coumadin  Weight Bearing: WBAT RLE   Pain Control: PRN oral narcotics  Anticipated Discharge Date: today-tomorrow, pending PICC placement and Infectious disease recommendations  Disposition: Home, HHPT.

## 2019-10-31 NOTE — PROGRESS NOTES
Primary Nurse Julia Yang and Charisma RN performed a dual skin assessment on this patient No impairment noted  Rene score is 21    Bedside and Verbal shift change report given to Alysha Cabrera RN (oncoming nurse) by Tata Smith RN (offgoing nurse). Report included the following information SBAR, Kardex, Intake/Output, MAR and Accordion.

## 2019-10-31 NOTE — PROGRESS NOTES
ID Progress Note  10/31/2019    Subjective:     He is having some pain in the knee    Objective:     Antibiotics:  1. Daptomycin       Vitals:   Visit Vitals  /63 (BP 1 Location: Right arm)   Pulse 67   Temp 98 °F (36.7 °C)   Resp 16   Ht 6' 2\" (1.88 m)   Wt 95.3 kg (210 lb)   SpO2 98%   BMI 26.96 kg/m²        Tmax:  Temp (24hrs), Av.8 °F (36.6 °C), Min:97.4 °F (36.3 °C), Max:98.1 °F (36.7 °C)      Exam:  Lungs:  clear to auscultation bilaterally  Heart:  regular rate and rhythm  Abdomen:  soft, non-tender. Bowel sounds normal. No masses,  no organomegaly    Labs:      Recent Labs     10/31/19  0238 10/30/19  1708   HGB 10.5*  --    BUN 23* 22*   CREA 1.04 1.01       Cultures:     Lab Results   Component Value Date/Time    Specimen Description: URINE 03/15/2012 11:55 AM     Lab Results   Component Value Date/Time    Culture result:  10/30/2019 02:13 PM     NO GROWTH AFTER 19 HOURS Culture performed on Fluid swab specimen    Culture result: MRSA NOT PRESENT 10/24/2019 08:25 AM    Culture result:  10/24/2019 08:25 AM         Screening of patient nares for MRSA is for surveillance purposes and, if positive, to facilitate isolation considerations in high risk settings. It is not intended for automatic decolonization interventions per se as regimens are not sufficiently effective to warrant routine use. Radiology:     Line/Insert Date:           Assessment:     1. S capalessia from outpatient culture of right TKR  2. Liver/kidney transplant with good function  3. He will need IV antibiotic therapy for 6 weeks followed by extended course of oral treatment  4. Would probably consider Laura catheter, given his renal disease    Objective:     1. Continue daptomycin   2.  Orders on chart when needed    Nora Roman MD

## 2019-10-31 NOTE — PROGRESS NOTES
Problem: Mobility Impaired (Adult and Pediatric)  Goal: *Acute Goals and Plan of Care (Insert Text)  Description  FUNCTIONAL STATUS PRIOR TO ADMISSION: Patient was independent and active without use of DME.    HOME SUPPORT PRIOR TO ADMISSION: The patient lived with wife but did not require assist.    Physical Therapy Goals  Initiated 10/31/2019    1. Patient will move from supine to sit and sit to supine  in bed with modified independence within 4 days. 2. Patient will perform sit to stand with modified independence within 4 days. 3. Patient will ambulate with modified independence for 300 feet with the least restrictive device within 4 days. 4. Patient will ascend/descend 4 stairs with 1 handrail(s) with modified independence within 4 days. 5. Patient will perform home exercise program per protocol with modified independence within 4 days. 6. Patient will demonstrate AROM 0-90 degrees in operative joint within 4 days. 10/31/2019 1008 by Jazmyn Butler, PT, DPT  Outcome: Progressing Towards Goal    PHYSICAL THERAPY EVALUATION  Patient: Edward Marlow (43 y.o. male)  Date: 10/31/2019  Primary Diagnosis: Infected prosthetic knee joint ([facility] [address] [T84.59XA, Z96.659]  Procedure(s) (LRB):  RIGHT KNEE REVISION, 1 COMPONENT (Right) 1 Day Post-Op   Precautions:   Fall, WBAT      ASSESSMENT  Based on the objective data described below, the patient presents with decreased functional mobility from baseline level of function. Patient currently limited by pain but otherwise moving well. Needing Aki for bed mobility to manage leg but otherwise able to manage trunk. CGA for transfers and gait with RW.   Will plan to address stairs this PM.    Current Level of Function Impacting Discharge (mobility/balance): Aki for bed mobility and CGA for transfers and gait with RW      Other factors to consider for discharge: needs IV antibiotics     Patient will benefit from skilled therapy intervention to address the above noted impairments. PLAN :  Recommendations and Planned Interventions: bed mobility training, transfer training, gait training, therapeutic exercises, patient and family training/education and therapeutic activities      Frequency/Duration: Patient will be followed by physical therapy:  twice daily to address goals. Recommendation for discharge: (in order for the patient to meet his/her long term goals)  Physical therapy at least 2 days/week in the home       IF patient discharges home will need the following DME: none         SUBJECTIVE:   Patient stated I haven't had any ice at all yet.     OBJECTIVE DATA SUMMARY:   HISTORY:    Past Medical History:   Diagnosis Date    Acute kidney failure with tubular necrosis ([facility] [address] 2017    DR Ciera Martinez    Adverse effect of anesthesia     violent with anesthesia in 1969    Anesthesia complication 6803    violent after anesthesia     Arrhythmia     A-FIB    Arthritis     Atrial tachycardia ([facility] [address]     DR HENRY-NO LONGER SEES THIS DR    Cataract     LEFT    Chronic cough 2014-present    Chronic kidney disease     elevated creatine level    Chronic pain     Cirrhosis of liver (HCC)     CKD (chronic kidney disease) stage 3, GFR 30-59 ml/min (HCC)     DR MEREDITH    Diabetes ([facility] [address]     Encephalopathy     10/2018- RELATED TO LIVER FAILURE    Esophageal tear 2011    Gastric varices 2016    Dr. Leif Tello    GERD (gastroesophageal reflux disease) 2007    TORE ESOPHAGUS    Gouty arthropathy     Iron deficiency anemia     RECEIVED IRON INFUSIONS     Lymphedema     RLL    CAMPOVERDE (nonalcoholic steatohepatitis) 2016    Dr. Live Client    Rosacea     Spider angioma 2016    upper torso- Dr. Asaf [facility]) [address], [address] [facility], 2018    RLE, LLE, RIGHT LEG    Umbilical hernia 8287     Past Surgical History:   Procedure Laterality Date    CARDIAC SURG PROCEDURE UNLIST      ABLATION    HX GI  2007    EGD X3 FOR ESOPHAGUS REPAIR    HX HERNIA REPAIR Right 2015,2018    X2    HX KNEE REPLACEMENT Right 2011    HX KNEE REPLACEMENT Right 10/31/2016    REVISION WITH SPACER    HX ORTHOPAEDIC  2010    LEFT KNEE ARTHROSCOPY    HX ORTHOPAEDIC  2011    RT WRIST, ELBOW CARTILIDGE REPAIR    HX ORTHOPAEDIC      RIGHT KNEE SURGERY  X4    HX OTHER SURGICAL Bilateral     TEAR DUCTS    HX OTHER SURGICAL Right 1978    fatty tumor arm    HX TONSILLECTOMY      HX TRANSPLANT  11/14/2018    LIVER, KIDNEY     HX VASCULAR ACCESS      LEFT ARM FISTULA       Personal factors and/or comorbidities impacting plan of care:     Home Situation  Home Environment: Private residence  # Steps to Enter: 3  Rails to Enter: Yes  Hand Rails : Bilateral  One/Two Story Residence: Split level(tri-level)  Lift Chair Available: Yes  Living Alone: No  Support Systems: Family member(s)  Patient Expects to be Discharged to[de-identified] Private residence  Current DME Used/Available at Home: nadeen Christensen Walker, nicole    EXAMINATION/PRESENTATION/DECISION MAKING:   Critical Behavior:  Neurologic State: Alert  Orientation Level: Oriented X4        Hearing: Auditory  Hearing Aids/Status: Bilateral(hearing aides)    Range Of Motion:  AROM: Generally decreased, functional                       Strength:    Strength: Generally decreased, functional       Functional Mobility:  Bed Mobility:     Supine to Sit: Minimum assistance  Sit to Supine: Minimum assistance  Scooting: Supervision  Transfers:  Sit to Stand: Contact guard assistance  Stand to Sit: Contact guard assistance                       Balance:   Sitting: Intact  Standing: Intact; With support  Ambulation/Gait Training:  Distance (ft): 250 Feet (ft)  Assistive Device: Gait belt;Walker, rolling  Ambulation - Level of Assistance: Contact guard assistance     Gait Description (WDL): Exceptions to WDL  Gait Abnormalities: Antalgic;Decreased step clearance        Base of Support: Center of gravity altered; Widened  Stance: Right decreased  Speed/Renee: Pace decreased (<100 feet/min); Slow  Step Length: Left shortened;Right shortened    Therapeutic Exercises: Will address this PM    Functional Measure:  Barthel Index:    Bathin  Bladder: 10  Bowels: 10  Groomin  Dressin  Feeding: 10  Mobility: 10  Stairs: 5  Toilet Use: 5  Transfer (Bed to Chair and Back): 10  Total: 70/100       The Barthel ADL Index: Guidelines  1. The index should be used as a record of what a patient does, not as a record of what a patient could do. 2. The main aim is to establish degree of independence from any help, physical or verbal, however minor and for whatever reason. 3. The need for supervision renders the patient not independent. 4. A patient's performance should be established using the best available evidence. Asking the patient, friends/relatives and nurses are the usual sources, but direct observation and common sense are also important. However direct testing is not needed. 5. Usually the patient's performance over the preceding 24-48 hours is important, but occasionally longer periods will be relevant. 6. Middle categories imply that the patient supplies over 50 per cent of the effort. 7. Use of aids to be independent is allowed. Hal Crowder., Barthel, D.W. (3312). Functional evaluation: the Barthel Index. [facility] (14)2. Nena Self, ITZELF, Anna Sevilla., Julia Fowler., [place], [address] [place] [address] (). Measuring the change indisability after inpatient rehabilitation; comparison of the responsiveness of the Barthel Index and Functional North Measure. Journal of Neurology, Neurosurgery, and Psychiatry, 66(4), 371-294. Linh Peace, N.J.A, Natalia Owens  W.J.NIKOLAI, & Trinh Gonzales MZhaneA. (2004.) Assessment of post-stroke quality of life in cost-effectiveness studies: The usefulness of the Barthel Index and the EuroQoL-5D.  Quality of Life Research, 13, 427-43         Pain Rating:  Reports pain with movement but does not rate    Activity Tolerance:   Good  Please refer to the flowsheet for vital signs taken during this treatment. After treatment patient left in no apparent distress:   Sitting in chair, Supine in bed, Call bell within reach and Caregiver / family present    COMMUNICATION/EDUCATION:   The patients plan of care was discussed with: Physical Therapist, Occupational Therapist and Registered Nurse. Fall prevention education was provided and the patient/caregiver indicated understanding., Patient/family have participated as able in goal setting and plan of care. and Patient/family agree to work toward stated goals and plan of care.     Thank you for this referral.  Kathrin Mckeon, PT, DPT   Time Calculation: 29 mins

## 2019-10-31 NOTE — PROGRESS NOTES
Bedside and Verbal shift change report given to Alyse Donnelly RN (oncoming nurse) by Tejal Mederos RN (offgoing nurse). Report included the following information SBAR, Kardex, Intake/Output, MAR and Recent Results.

## 2019-10-31 NOTE — PROGRESS NOTES
Transition of Care Plan     Home with home health --[facility] referral sent for skilled nursing     Home IV abx TBD--  Referral sent to HCP to determine benefit for IV abx therapy at home     Has Dme at home       Reason for Admission:   Revision of right knee replacement   Infection of right knee prothesis   Had kidney /liver transplant Nov 2018 at [address] [facility] Score:    19              Do you (patient/family) have any concerns for transition/discharge? None voiced. Plan for utilizing home health:   Yes for skilled nursing   Patient does not want home PT    Current Advanced Directive/Advance Care Plan:  Not in chart  Patient not interested at this time. Transition of Care Plan:      Home with home health, ,IV abx infusion and medical follow up     CM met with patient in his room to introduce self and explain role. Patient confirmed demographics, insurance Medicare and BCBS, and PCP Kady Lopez    Patient lives at [place] in [place] [place] [place] with his wife Edith Tejeda [phone]. Patient was self care and independent prior to admission. He has RW and cane at home if needed. Patient said he is in agreement with [facility] for skilled nursing. Fritz Freeman He chose [facility] for skilled nursing, PICC line care and wound checks. He does not want home PT as he has had it many times for knee surgery. He said he knows what to do in terms of PT. Referral sent to [facility] [phone] via [facility]. For IV Abx infusion, patient chose [facility] [phone]. No orders yet but CM sent referral to determine insurance  benefit-- patient has Medicare and BCBS. Freedom of choice letter signed and placed in chart    Patient said he will need to have home IV abx as his wife works full time as a nursery  and cannot transport him to out patient infusion center. Patient is retired. He owned a business. CM to follow for transition of care plan  1.  Home health --[facility]ing for [facility]  2. IV abx therapy at home-- -9615  to determine benefit-- and out of pocket costs  3. Home IV Abx orders. Care Management Interventions  PCP Verified by CM:  Yes  Mode of Transport at Discharge: (car with wife)  Transition of Care Consult (CM Consult): Discharge Planning  Discharge Durable Medical Equipment: No  Current Support Network: Lives with Spouse(lives with wife in tri level home   retired--self care   Kidney/liver transplant 11/18   No AMD in chart.)  Confirm Follow Up Transport: Family  Plan discussed with Pt/Family/Caregiver: Yes  Freedom of Choice Offered: Yes  Discharge Location  Discharge Placement: Home with home health(home with IV abx infusion )

## 2019-10-31 NOTE — PROGRESS NOTES
IV Antibiotic Orders    1. Diagnosis:  Staph infection TKR  2. Routine line care  3. Antibiotic:  Daptomycin 600 mg IV Q 24 hours  4. Lab each Monday:   CBC/diff/platelets   BMP   CPK   ESR  5. Lab each Thursday:   CBC/diff/platelets   BMP   CPK  6. Fax lab to Dr. Heavenly Clements @ [phone].  7.  Call Dr. Heavenly Clements @ [phone] for wbc under 4, creatinine over 1.8 or CPK over 300  8. Duration of therapy: 6 weeks   Please call Dr. Heavenly Clements @ [phone] before stopping therapy. 9.  Allergies:  PCN  10.  Call [phone] with name of [address] [place] [address] and to arrange follow up appointment in 15 days    Haley Ramirez MD

## 2019-10-31 NOTE — PROGRESS NOTES
Problem: Mobility Impaired (Adult and Pediatric)  Goal: *Acute Goals and Plan of Care (Insert Text)  Description  FUNCTIONAL STATUS PRIOR TO ADMISSION: Patient was independent and active without use of DME.    HOME SUPPORT PRIOR TO ADMISSION: The patient lived with wife but did not require assist.    Physical Therapy Goals  Initiated 10/31/2019    1. Patient will move from supine to sit and sit to supine  in bed with modified independence within 4 days. 2. Patient will perform sit to stand with modified independence within 4 days. 3. Patient will ambulate with modified independence for 300 feet with the least restrictive device within 4 days. 4. Patient will ascend/descend 4 stairs with 1 handrail(s) with modified independence within 4 days. 5. Patient will perform home exercise program per protocol with modified independence within 4 days. 6. Patient will demonstrate AROM 0-90 degrees in operative joint within 4 days. PHYSICAL THERAPY TREATMENT  Patient: Alan Gambino III (35 y.o. male)  Date: 10/31/2019  Diagnosis: Infected prosthetic knee joint ([facility] [address] [T84.59XA, Z96.659] <principal problem not specified>  Procedure(s) (LRB):  RIGHT KNEE REVISION, 1 COMPONENT (Right) 1 Day Post-Op  Precautions: Fall, WBAT  Chart, physical therapy assessment, plan of care and goals were reviewed. ASSESSMENT  Patient making steady progress toward goals. Limited by pain but otherwise doing well. Will need to address stairs this PM in prep to DC home. Do not anticipate any issues with mobility. Current Level of Function Impacting Discharge (mobility/balance): CGA to amb with RW    Other factors to consider for discharge: pain limits progress at times         PLAN :  Patient continues to benefit from skilled intervention to address the above impairments. Continue treatment per established plan of care. to address goals.     Recommendation for discharge: (in order for the patient to meet his/her long term goals)  Physical therapy at least 2 days/week in the home       IF patient discharges home will need the following DME: patient owns DME required for discharge       SUBJECTIVE:   Patient stated I am more sore now than I was earlier.     OBJECTIVE DATA SUMMARY:   Critical Behavior:  Neurologic State: Alert  Orientation Level: Oriented X4          Range of Motion:  AROM: Generally decreased, functional                         Functional Mobility Training:  Bed Mobility:     Supine to Sit: Minimum assistance  Sit to Supine: Minimum assistance  Scooting: Supervision        Transfers:  Sit to Stand: Contact guard assistance  Stand to Sit: Contact guard assistance                             Balance:  Sitting: Intact  Standing: Intact; With support  Ambulation/Gait Training:  Distance (ft): 250 Feet (ft)  Assistive Device: Gait belt;Walker, rolling  Ambulation - Level of Assistance: Contact guard assistance     Gait Description (WDL): Exceptions to WDL  Gait Abnormalities: Antalgic;Decreased step clearance        Base of Support: Center of gravity altered; Widened  Stance: Right decreased  Speed/Renee: Pace decreased (<100 feet/min); Slow  Step Length: Left shortened;Right shortened           Pain Rating:  Reports pain but does not rate    Activity Tolerance:   Good  Please refer to the flowsheet for vital signs taken during this treatment.     After treatment patient left in no apparent distress:   Supine in bed and Call bell within reach    COMMUNICATION/COLLABORATION:   The patients plan of care was discussed with: Physical Therapist, Occupational Therapist and Registered Nurse    Bauidlio Vale PT, DPT   Time Calculation: 19 mins

## 2019-11-01 ENCOUNTER — HOME HEALTH ADMISSION (OUTPATIENT)
Dept: HOME HEALTH SERVICES | Facility: HOME HEALTH | Age: 64
End: 2019-11-01
Payer: MEDICARE

## 2019-11-01 ENCOUNTER — APPOINTMENT (OUTPATIENT)
Dept: GENERAL RADIOLOGY | Age: 64
DRG: 467 | End: 2019-11-01
Attending: RADIOLOGY
Payer: MEDICARE

## 2019-11-01 ENCOUNTER — APPOINTMENT (OUTPATIENT)
Dept: INTERVENTIONAL RADIOLOGY/VASCULAR | Age: 64
DRG: 467 | End: 2019-11-01
Attending: INTERNAL MEDICINE
Payer: MEDICARE

## 2019-11-01 VITALS
SYSTOLIC BLOOD PRESSURE: 120 MMHG | TEMPERATURE: 98.3 F | BODY MASS INDEX: 26.95 KG/M2 | OXYGEN SATURATION: 100 % | DIASTOLIC BLOOD PRESSURE: 66 MMHG | HEART RATE: 86 BPM | WEIGHT: 210 LBS | HEIGHT: 74 IN | RESPIRATION RATE: 18 BRPM

## 2019-11-01 LAB
ABO + RH BLD: NORMAL
BLD PROD TYP BPU: NORMAL
BLD PROD TYP BPU: NORMAL
BLOOD BANK CMNT PATIENT-IMP: NORMAL
BLOOD GROUP ANTIBODIES SERPL: NORMAL
BPU ID: NORMAL
BPU ID: NORMAL
CROSSMATCH RESULT,%XM: NORMAL
CROSSMATCH RESULT,%XM: NORMAL
GLUCOSE BLD STRIP.AUTO-MCNC: 152 MG/DL (ref 65–100)
GLUCOSE BLD STRIP.AUTO-MCNC: 191 MG/DL (ref 65–100)
INR PPP: 1.3 (ref 0.9–1.1)
PROTHROMBIN TIME: 13 SEC (ref 9–11.1)
SERVICE CMNT-IMP: ABNORMAL
SERVICE CMNT-IMP: ABNORMAL
SPECIMEN EXP DATE BLD: NORMAL
STATUS OF UNIT,%ST: NORMAL
STATUS OF UNIT,%ST: NORMAL
UNIT DIVISION, %UDIV: 0
UNIT DIVISION, %UDIV: 0

## 2019-11-01 PROCEDURE — 36415 COLL VENOUS BLD VENIPUNCTURE: CPT

## 2019-11-01 PROCEDURE — 74011000250 HC RX REV CODE- 250: Performed by: RADIOLOGY

## 2019-11-01 PROCEDURE — 82962 GLUCOSE BLOOD TEST: CPT

## 2019-11-01 PROCEDURE — 36558 INSERT TUNNELED CV CATH: CPT

## 2019-11-01 PROCEDURE — 74011636637 HC RX REV CODE- 636/637: Performed by: PHYSICIAN ASSISTANT

## 2019-11-01 PROCEDURE — 97116 GAIT TRAINING THERAPY: CPT | Performed by: PHYSICAL THERAPIST

## 2019-11-01 PROCEDURE — 71045 X-RAY EXAM CHEST 1 VIEW: CPT

## 2019-11-01 PROCEDURE — 74011250637 HC RX REV CODE- 250/637: Performed by: PHYSICIAN ASSISTANT

## 2019-11-01 PROCEDURE — C1751 CATH, INF, PER/CENT/MIDLINE: HCPCS

## 2019-11-01 PROCEDURE — 05H533Z INSERTION OF INFUSION DEVICE INTO RIGHT SUBCLAVIAN VEIN, PERCUTANEOUS APPROACH: ICD-10-PCS | Performed by: INTERNAL MEDICINE

## 2019-11-01 PROCEDURE — 97530 THERAPEUTIC ACTIVITIES: CPT | Performed by: PHYSICAL THERAPIST

## 2019-11-01 PROCEDURE — 85610 PROTHROMBIN TIME: CPT

## 2019-11-01 PROCEDURE — 74011250636 HC RX REV CODE- 250/636: Performed by: RADIOLOGY

## 2019-11-01 PROCEDURE — 74011250636 HC RX REV CODE- 250/636: Performed by: PHYSICIAN ASSISTANT

## 2019-11-01 RX ORDER — HEPARIN 100 UNIT/ML
SYRINGE INTRAVENOUS
Status: DISCONTINUED
Start: 2019-11-01 | End: 2019-11-01 | Stop reason: HOSPADM

## 2019-11-01 RX ORDER — LIDOCAINE HYDROCHLORIDE 10 MG/ML
INJECTION, SOLUTION EPIDURAL; INFILTRATION; INTRACAUDAL; PERINEURAL
Status: DISCONTINUED
Start: 2019-11-01 | End: 2019-11-01 | Stop reason: HOSPADM

## 2019-11-01 RX ORDER — LIDOCAINE HYDROCHLORIDE 10 MG/ML
10 INJECTION, SOLUTION EPIDURAL; INFILTRATION; INTRACAUDAL; PERINEURAL
Status: COMPLETED | OUTPATIENT
Start: 2019-11-01 | End: 2019-11-01

## 2019-11-01 RX ORDER — OXYCODONE HYDROCHLORIDE 5 MG/1
5 TABLET ORAL
Qty: 60 TAB | Refills: 0 | Status: SHIPPED | OUTPATIENT
Start: 2019-11-01 | End: 2019-12-01

## 2019-11-01 RX ORDER — HEPARIN 100 UNIT/ML
500 SYRINGE INTRAVENOUS AS NEEDED
Status: DISCONTINUED | OUTPATIENT
Start: 2019-11-01 | End: 2019-11-01 | Stop reason: HOSPADM

## 2019-11-01 RX ORDER — FENTANYL CITRATE 50 UG/ML
100 INJECTION, SOLUTION INTRAMUSCULAR; INTRAVENOUS
Status: DISCONTINUED | OUTPATIENT
Start: 2019-11-01 | End: 2019-11-01

## 2019-11-01 RX ORDER — FENTANYL CITRATE 50 UG/ML
INJECTION, SOLUTION INTRAMUSCULAR; INTRAVENOUS
Status: DISCONTINUED
Start: 2019-11-01 | End: 2019-11-01

## 2019-11-01 RX ADMIN — FENTANYL CITRATE 50 MCG: 50 INJECTION, SOLUTION INTRAMUSCULAR; INTRAVENOUS at 16:09

## 2019-11-01 RX ADMIN — Medication 500 UNITS: at 16:07

## 2019-11-01 RX ADMIN — MYCOPHENOLATE MOFETIL 250 MG: 250 CAPSULE ORAL at 17:44

## 2019-11-01 RX ADMIN — FENTANYL CITRATE 50 MCG: 50 INJECTION, SOLUTION INTRAMUSCULAR; INTRAVENOUS at 16:15

## 2019-11-01 RX ADMIN — MAGNESIUM OXIDE TAB 400 MG (241.3 MG ELEMENTAL MG) 200 MG: 400 (241.3 MG) TAB at 09:06

## 2019-11-01 RX ADMIN — OXYCODONE HYDROCHLORIDE 5 MG: 5 TABLET ORAL at 00:44

## 2019-11-01 RX ADMIN — MAGNESIUM OXIDE TAB 400 MG (241.3 MG ELEMENTAL MG) 200 MG: 400 (241.3 MG) TAB at 17:42

## 2019-11-01 RX ADMIN — TAMSULOSIN HYDROCHLORIDE 0.4 MG: 0.4 CAPSULE ORAL at 09:03

## 2019-11-01 RX ADMIN — INSULIN LISPRO 2 UNITS: 100 INJECTION, SOLUTION INTRAVENOUS; SUBCUTANEOUS at 11:51

## 2019-11-01 RX ADMIN — OXYCODONE HYDROCHLORIDE 5 MG: 5 TABLET ORAL at 09:06

## 2019-11-01 RX ADMIN — PANTOPRAZOLE SODIUM 40 MG: 40 TABLET, DELAYED RELEASE ORAL at 09:03

## 2019-11-01 RX ADMIN — PREDNISONE 5 MG: 5 TABLET ORAL at 07:29

## 2019-11-01 RX ADMIN — Medication 10 ML: at 14:00

## 2019-11-01 RX ADMIN — OXYCODONE HYDROCHLORIDE 5 MG: 5 TABLET ORAL at 14:04

## 2019-11-01 RX ADMIN — Medication 5 ML: at 06:00

## 2019-11-01 RX ADMIN — INSULIN LISPRO 2 UNITS: 100 INJECTION, SOLUTION INTRAVENOUS; SUBCUTANEOUS at 07:29

## 2019-11-01 RX ADMIN — MYCOPHENOLATE MOFETIL 250 MG: 250 CAPSULE ORAL at 09:03

## 2019-11-01 RX ADMIN — METOPROLOL TARTRATE 12.5 MG: 25 TABLET ORAL at 17:43

## 2019-11-01 RX ADMIN — METOPROLOL TARTRATE 12.5 MG: 25 TABLET ORAL at 09:04

## 2019-11-01 RX ADMIN — LIDOCAINE HYDROCHLORIDE 10 ML: 10 INJECTION, SOLUTION EPIDURAL; INFILTRATION; INTRACAUDAL; PERINEURAL at 16:07

## 2019-11-01 RX ADMIN — TACROLIMUS 2 MG: 1 CAPSULE ORAL at 09:07

## 2019-11-01 NOTE — PROGRESS NOTES
DEBBY received a call from [place] from [employer] and she stated that pt's co-pay for his antibx should he receive them at home would be 95.23 per day. DEBBY met with pt to inform him of this and he stated that he cannot afford this. He stated that he will find a way to go to the [facility] daily. DEBBY had Dr. Davina brown.  Debbie Medina

## 2019-11-01 NOTE — PROGRESS NOTES
Physical Therapy  Patient awaiting placement of PICC and will potentially be DC'd after placement. Will defer at this time and will follow up tmrw IF he is not DC. He is however, safe to be DC from a mobility standpoint.   Babak Novoa, PT, DPT

## 2019-11-01 NOTE — PROGRESS NOTES
Bedside shift change report given to Aubrie RN (oncoming nurse) by Kristin Díaz RN (offgoing nurse). Report included the following information SBAR, Kardex, Intake/Output, MAR and Recent Results.

## 2019-11-01 NOTE — PROGRESS NOTES
Bedside and Verbal shift change report given to Lesley Jones RN (oncoming nurse) by Declan Sotomayor RN (offgoing nurse). Report included the following information SBAR.

## 2019-11-01 NOTE — PROGRESS NOTES
ID Progress Note  2019    Subjective:     He is having some pain in the knee, but is feeling better    Objective:     Antibiotics:  1. Daptomycin       Vitals:   Visit Vitals  /62 (BP 1 Location: Right arm, BP Patient Position: At rest)   Pulse 62   Temp 98.8 °F (37.1 °C)   Resp 17   Ht 6' 2\" (1.88 m)   Wt 95.3 kg (210 lb)   SpO2 100%   BMI 26.96 kg/m²        Tmax:  Temp (24hrs), Av.5 °F (36.9 °C), Min:98 °F (36.7 °C), Max:98.8 °F (37.1 °C)      Exam:  Lungs:  clear to auscultation bilaterally  Heart:  regular rate and rhythm  Abdomen:  soft, non-tender. Bowel sounds normal. No masses,  no organomegaly    Labs:      Recent Labs     10/31/19  0238 10/30/19  1708   HGB 10.5*  --    BUN 23* 22*   CREA 1.04 1.01       Cultures:     Lab Results   Component Value Date/Time    Specimen Description: URINE 03/15/2012 11:55 AM     Lab Results   Component Value Date/Time    Culture result: Culture performed on Fluid swab specimen 10/30/2019 02:13 PM    Culture result: No growth thus far, holding 14 days. 10/30/2019 02:13 PM    Culture result: No growth thus far, holding 14 days. 10/30/2019 02:13 PM       Radiology:     Line/Insert Date:           Assessment:     1. S sin from outpatient culture of right TKR  2. Liver/kidney transplant with good function  3. He will need IV antibiotic therapy for 6 weeks followed by extended course of oral treatment  4. Would probably consider Laura catheter, given his renal disease--ordered    Objective:     1. Continue daptomycin   2.  Orders on chart when needed    Meredith Botello MD

## 2019-11-01 NOTE — DISCHARGE INSTRUCTIONS
IV Antibiotic Orders     1. Diagnosis:  Staph infection TKR  2. Routine line care  3. Antibiotic:  Daptomycin 600 mg IV Q 24 hours  4. Lab each Monday:             CBC/diff/platelets             BMP             CPK             ESR  5. Lab each Thursday:             CBC/diff/platelets             BMP             CPK  6. Fax lab to Dr. Antonieta Scales @ [phone].  7.  Call Dr. Antonieta Scales @ [phone] for wbc under 4, creatinine over 1.8 or CPK over 300  8. Duration of therapy: 6 weeks             Please call Dr. Antonieta Scales @ [phone] before stopping therapy. 9.  Allergies:  PCN  10. Call [phone] with name of [address] [place] [address] and to arrange follow up appointment in 14 days    Discharge Instructions Knee Replacement  Dr. Brenda Maher    Patient Name: Ankit Rocha III  Date of procedure: 10/30/2019   Procedure: Procedure(s):  RIGHT KNEE REVISION, 1 COMPONENT  Surgeon: Surgeon(s) and Role:     * Grecia Will MD - Primary  PCP: Allyn Almonte MD  Date of discharge: No discharge date for patient encounter. Follow up appointments   Follow up with Dr. Brenda Maher in 10-14 days. Call [phone] to make an appointment.  If home health has been arranged for you the agency will contact you to arrange dates/times for visits. Please call them if you do not hear from them within 24 hours after you are discharged    When to call your Orthopaedic Surgeon: Call [phone].  If you call after 5pm or on a weekend, the on call physician will be contacted   Unrelieved pain   Signs of infection-if your incision is red; continues to have drainage; drainage has a foul odor or if you have a persistent fever over 101 degrees   Signs of a blood clot in your leg-calf pain, tenderness, redness, swelling of lower leg    When to call your Primary Care Physician:   Concerns about medical conditions such as diabetes, high blood pressure, asthma, congestive heart failure   Call if blood sugars are elevated, persistent headache or dizziness, coughing or congestion, constipation or diarrhea, burning with urination, abnormal heart rate    When to call 911and go to the nearest emergency room   Sudden onset of chest pain, shortness of breath, difficulty breathing    Activity   Weight bearing as tolerated with walker or crutches.  Complete your Home Exercise Program daily as instructed by your therapist.    Classie Nati up every one hour and walk (except at night when sleeping)   Do not drive or operate heavy machinery    Incision Care   The Aquacel (brown, waterproof) surgical dressing is to remain on your knee for 7 days. On the 7th day have someone gently peel the dressing off by carefully lifting the edge and stretching it slightly to break the adhesive seal and leave incision open to air. You may take a shower with the Aquacel dressing in place.  You do have staples in your knee incision; if present they will be removed by the [address] [place] [address] [facility] staff in 10-14 days and steri-strips will be applied. Leave the steri-strips on until they fall off   Once the Aquacel is removed, you may get your incision wet but do not submerge your incision under water in a bath tub, hot tub or swimming pool for 6 weeks after surgery. Preventing blood clots    Take Coumadin as prescribed. Pain management   Keep ice wrap in place except when walking; changing gel packs every 4 hours   Lie down and elevate your leg on pillows for about 30 minutes after walking to decrease swelling and pain   Do ankle pumps (10 repetitions) every hour while awake and get up frequently to walk   Take narcotic pain pill as prescribed if needed. Take with food; avoid alcohol while taking pain medication. Decrease the amount of narcotic pain medication as your pain lessens   Do not take any over-the-counter medication except for Tylenol (acetaminophen). Please be aware that many medications contain Tylenol.  We do not want you to take too much Tylenol so please use this as your guide:  o Do not take more than 3 Grams of Tylenol in a 24 hour period. (There are 1000 milligrams in one Gram  o 325mg of Tylenol per tablet (do not take more than 9 tablets in 24 hours)  o 500mg of Tylenol per tablet (do not take more than 6 tablets in 24 hours)    Diet   Resume usual diet; drink plenty of fluids; eat foods high in fiber   Take over-the-counter stool softeners and laxatives to prevent constipation.

## 2019-11-01 NOTE — PROGRESS NOTES
Problem: Mobility Impaired (Adult and Pediatric)  Goal: *Acute Goals and Plan of Care (Insert Text)  Description  FUNCTIONAL STATUS PRIOR TO ADMISSION: Patient was independent and active without use of DME.    HOME SUPPORT PRIOR TO ADMISSION: The patient lived with wife but did not require assist.    Physical Therapy Goals  Initiated 10/31/2019    1. Patient will move from supine to sit and sit to supine  in bed with modified independence within 4 days. 2. Patient will perform sit to stand with modified independence within 4 days. 3. Patient will ambulate with modified independence for 300 feet with the least restrictive device within 4 days. 4. Patient will ascend/descend 4 stairs with 1 handrail(s) with modified independence within 4 days. 5. Patient will perform home exercise program per protocol with modified independence within 4 days. 6. Patient will demonstrate AROM 0-90 degrees in operative joint within 4 days. Outcome: Progressing Towards Goal   PHYSICAL THERAPY TREATMENT  Patient: Mitul Crook III (17 y.o. male)  Date: 11/1/2019  Diagnosis: Infected prosthetic knee joint ([facility] [address] [T84.59XA, Z96.659] <principal problem not specified>  Procedure(s) (LRB):  RIGHT KNEE REVISION, 1 COMPONENT (Right) 2 Days Post-Op  Precautions: Fall, WBAT  Chart, physical therapy assessment, plan of care and goals were reviewed. ASSESSMENT  Patient making steady progress toward goals. Needing Aki for bed mobility and CGA for transfers. Limited mainly by pain and otherwise doing well. Able to up/down 4 stairs with B rail and CGA with no difficulty noted. Able to sit in chair post ambulation but cannot tolerate knee flexion and requested stool to elevate leg.   [facility].    Current Level of Function Impacting Discharge (mobility/balance): Aki bed mobility and CGA transfers    Other factors to consider for discharge: pain limits mobility         PLAN :  Patient continues to benefit from skilled intervention to address the above impairments. Continue treatment per established plan of care. to address goals. Recommendation for discharge: (in order for the patient to meet his/her long term goals)  Physical therapy at least 2 days/week in the home     IF patient discharges home will need the following DME: patient owns DME required for discharge       SUBJECTIVE:   Patient stated It's more sore today\"    OBJECTIVE DATA SUMMARY:   Critical Behavior:  Neurologic State: Alert  Orientation Level: Oriented X4          Range of Motion:   Not tested                         Functional Mobility Training:  Bed Mobility:     Supine to Sit: Minimum assistance  Sit to Supine: Minimum assistance  Scooting: Supervision        Transfers:  Sit to Stand: Contact guard assistance  Stand to Sit: Contact guard assistance                             Balance:  Sitting: Intact  Standing: Intact; With support  Ambulation/Gait Training:  Distance (ft): 250 Feet (ft)  Assistive Device: Gait belt;Walker, rolling  Ambulation - Level of Assistance: Contact guard assistance        Gait Abnormalities: Antalgic;Decreased step clearance; Step to gait        Base of Support: Center of gravity altered  Stance: Right decreased  Speed/Renee: Pace decreased (<100 feet/min); Slow  Step Length: Left shortened;Right shortened       Stairs:  Number of Stairs Trained: 4  Stairs - Level of Assistance: Contact guard assistance   Rail Use: Both      Pain Rating:  Reports pain but does not rate    Activity Tolerance:   Good and requires frequent rest breaks  Please refer to the flowsheet for vital signs taken during this treatment.     After treatment patient left in no apparent distress:   Sitting in chair, Call bell within reach and Bed / chair alarm activated    COMMUNICATION/COLLABORATION:   The patients plan of care was discussed with: Physical Therapist, Occupational Therapist and Registered Nurse    Jazmin Maria PT, DPT   Time Calculation: 23 mins

## 2019-11-01 NOTE — PROGRESS NOTES
Complaints: none  Events: none        GEN:   NAD. AOx3   ABD:   S/NT/ND   RLE:    Dressing C/D/I               5/5 motor               Calf nttp (Bilat)               Sensate all distribution to light touch               1+ dp/pt pulses, foot perfused              Lab Results   Component Value Date/Time     HGB 10.5 (L) 10/31/2019 02:38 AM     INR 1.2 (H) 10/31/2019 02:38 AM               Lab Results   Component Value Date/Time     Sodium 138 10/31/2019 02:38 AM     Potassium 4.8 10/31/2019 02:38 AM     Chloride 108 10/31/2019 02:38 AM     CO2 25 10/31/2019 02:38 AM     BUN 23 (H) 10/31/2019 02:38 AM     Creatinine 1.04 10/31/2019 02:38 AM     Calcium 8.1 (L) 10/31/2019 02:38 AM     Magnesium 1.5 (L) 05/20/2017 03:39 AM     Phosphorus 3.2 05/22/2017 07:21 AM               POD #2 RIGHT TOTAL KNEE I&D/poly exchange.  Satisfactory progress.     ABX: Per infectious diseased, pt will require PICC line prior to discharge  DVT Prophylaxis: Coumadin  Weight Bearing: WBAT RLE   Pain Control: PRN oral narcotics  Anticipated Discharge Date: today; pending PICC placement and Infectious disease recommendations  Disposition: Home with infusion appts and PCP appointments for coumadin monitoring

## 2019-11-01 NOTE — PROGRESS NOTES
CLARA-Home with home health SN with [facility]. Pt is set up with the [facility] starting tomorrow. CM obtained the [facility] form and had it completed by Dr. Sussy Florentino . CM faxed this order along with his face sheet to [facility] at the [facility]. CM spoke with [facility] and she confirmed that she received it. She set pt's appointment up for 8am tomorrow and this is shown on the AVS.     CM met with pt to discuss above. He stated that he will be able to make this appointment. We discussed his home health with [facility] for PT and SN. Pt stated that he does not want PT. He stated that he is in agreement with the SN.  CM placed this information on pt's AVS. Justine Medina

## 2019-11-02 ENCOUNTER — HOSPITAL ENCOUNTER (OUTPATIENT)
Dept: INFUSION THERAPY | Age: 64
Discharge: HOME | End: 2019-11-02
Payer: MEDICARE

## 2019-11-02 ENCOUNTER — HOME CARE VISIT (OUTPATIENT)
Dept: SCHEDULING | Facility: HOME HEALTH | Age: 64
End: 2019-11-02
Payer: MEDICARE

## 2019-11-02 VITALS
SYSTOLIC BLOOD PRESSURE: 129 MMHG | DIASTOLIC BLOOD PRESSURE: 61 MMHG | RESPIRATION RATE: 18 BRPM | OXYGEN SATURATION: 98 % | TEMPERATURE: 97.4 F | HEART RATE: 68 BPM

## 2019-11-02 PROCEDURE — G0299 HHS/HOSPICE OF RN EA 15 MIN: HCPCS

## 2019-11-02 PROCEDURE — 96374 THER/PROPH/DIAG INJ IV PUSH: CPT

## 2019-11-02 PROCEDURE — 3331090001 HH PPS REVENUE CREDIT

## 2019-11-02 PROCEDURE — 400013 HH SOC

## 2019-11-02 PROCEDURE — 3331090002 HH PPS REVENUE DEBIT

## 2019-11-02 PROCEDURE — 74011250636 HC RX REV CODE- 250/636: Performed by: INTERNAL MEDICINE

## 2019-11-02 PROCEDURE — 74011000250 HC RX REV CODE- 250: Performed by: INTERNAL MEDICINE

## 2019-11-02 RX ORDER — SODIUM CHLORIDE 0.9 % (FLUSH) 0.9 %
5-10 SYRINGE (ML) INJECTION AS NEEDED
Status: DISCONTINUED | OUTPATIENT
Start: 2019-11-02 | End: 2019-11-03 | Stop reason: HOSPADM

## 2019-11-02 RX ORDER — HEPARIN 100 UNIT/ML
500 SYRINGE INTRAVENOUS AS NEEDED
Status: DISCONTINUED | OUTPATIENT
Start: 2019-11-02 | End: 2019-11-03 | Stop reason: HOSPADM

## 2019-11-02 RX ADMIN — Medication 10 ML: at 09:00

## 2019-11-02 RX ADMIN — DAPTOMYCIN 600 MG: 500 INJECTION, POWDER, LYOPHILIZED, FOR SOLUTION INTRAVENOUS at 08:56

## 2019-11-02 RX ADMIN — Medication 500 UNITS: at 09:00

## 2019-11-02 NOTE — PROGRESS NOTES
Outpatient Infusion Center Progress Note    1712 Pt admit to [facility] for Daptomycin ambulatory in stable condition. Assessment completed. Patient reports pain in his upper thigh after his procedure yesterday. No other complaints at this time. Patient has a CVL in his Right Subclavian. Visit Vitals  /61 (BP 1 Location: Left arm, BP Patient Position: At rest)   Pulse 68   Temp 97.4 °F (36.3 °C)   Resp 18   SpO2 98%       Medications Administered     DAPTOmycin (CUBICIN) 600 mg in sterile water (preservative free) 12 mL IV syringe RF formulation     Admin Date  11/02/2019 Action  Given Dose  600 mg Route  IntraVENous Administered By  [facility]          heparin (porcine) pf 500 Units     Admin Date  11/02/2019 Action  Given Dose  500 Units Route  IntraVENous Administered By  [facility]          sodium chloride (NS) flush 5-10 mL     Admin Date  11/02/2019 Action  Given Dose  10 mL Route  IntraVENous Administered By  [facility]                5835 Pt tolerated treatment well. D/c home ambulatory in no distress.  Pt aware of next appointment scheduled for 11/3/19    Brooks Barthel, RN

## 2019-11-03 ENCOUNTER — HOSPITAL ENCOUNTER (OUTPATIENT)
Dept: INFUSION THERAPY | Age: 64
Discharge: HOME | End: 2019-11-03
Payer: MEDICARE

## 2019-11-03 VITALS
OXYGEN SATURATION: 96 % | BODY MASS INDEX: 25.41 KG/M2 | RESPIRATION RATE: 18 BRPM | SYSTOLIC BLOOD PRESSURE: 118 MMHG | WEIGHT: 198 LBS | TEMPERATURE: 98.7 F | HEIGHT: 74 IN | DIASTOLIC BLOOD PRESSURE: 70 MMHG | HEART RATE: 61 BPM

## 2019-11-03 VITALS
RESPIRATION RATE: 18 BRPM | DIASTOLIC BLOOD PRESSURE: 68 MMHG | HEART RATE: 70 BPM | SYSTOLIC BLOOD PRESSURE: 119 MMHG | TEMPERATURE: 96.9 F

## 2019-11-03 PROCEDURE — 3331090001 HH PPS REVENUE CREDIT

## 2019-11-03 PROCEDURE — 74011000250 HC RX REV CODE- 250: Performed by: INTERNAL MEDICINE

## 2019-11-03 PROCEDURE — 74011250636 HC RX REV CODE- 250/636: Performed by: INTERNAL MEDICINE

## 2019-11-03 PROCEDURE — 3331090002 HH PPS REVENUE DEBIT

## 2019-11-03 PROCEDURE — 96374 THER/PROPH/DIAG INJ IV PUSH: CPT

## 2019-11-03 RX ADMIN — DAPTOMYCIN 600 MG: 500 INJECTION, POWDER, LYOPHILIZED, FOR SOLUTION INTRAVENOUS at 07:55

## 2019-11-03 NOTE — PROGRESS NOTES
OPIC Short Note                       Date: November 3, 2019    Name: Teri Tolbert    MRN: [id number]         : 1955    0750 Pt admit to [facility] for Daptomycin ambulatory in stable condition. Assessment completed. No new concerns voiced. Mr. Riya Bernabe vitals were reviewed prior to treatment. Patient Vitals for the past 12 hrs:   Temp Pulse Resp BP   19 0754 96.9 °F (36.1 °C) 70 18 119/68       PICC with positive blood return flushed, heparinized and capped per protocol. Medications given:   Medications Administered     DAPTOmycin (CUBICIN) 600 mg in sterile water (preservative free) 12 mL IV syringe RF formulation     Admin Date  2019 Action  Given Dose  600 mg Route  IntraVENous Administered By  Nba Brannon, RN                   8347 Pt tolerated treatment well. D/c home ambulatory in no distress.     Future Appointments   Date Time Provider Laine Roper   11/3/2019  8:00 AM MARIA A ZAVALA CHAIR [facility]   2019  4:00 PM MARIA A ZAVALA CHAIR [address], RN  November 3, 2019  7:58 AM

## 2019-11-04 ENCOUNTER — HOSPITAL ENCOUNTER (OUTPATIENT)
Dept: INFUSION THERAPY | Age: 64
Discharge: HOME | End: 2019-11-04
Payer: MEDICARE

## 2019-11-04 VITALS
HEART RATE: 63 BPM | RESPIRATION RATE: 18 BRPM | TEMPERATURE: 97 F | SYSTOLIC BLOOD PRESSURE: 133 MMHG | DIASTOLIC BLOOD PRESSURE: 72 MMHG

## 2019-11-04 LAB
ANION GAP SERPL CALC-SCNC: 7 MMOL/L (ref 5–15)
BASOPHILS # BLD: 0.1 K/UL (ref 0–0.1)
BASOPHILS NFR BLD: 1 % (ref 0–1)
BUN SERPL-MCNC: 27 MG/DL (ref 6–20)
BUN/CREAT SERPL: 31 (ref 12–20)
CALCIUM SERPL-MCNC: 9 MG/DL (ref 8.5–10.1)
CHLORIDE SERPL-SCNC: 105 MMOL/L (ref 97–108)
CK SERPL-CCNC: 44 U/L (ref 39–308)
CO2 SERPL-SCNC: 25 MMOL/L (ref 21–32)
CREAT SERPL-MCNC: 0.87 MG/DL (ref 0.7–1.3)
DIFFERENTIAL METHOD BLD: ABNORMAL
EOSINOPHIL # BLD: 0.2 K/UL (ref 0–0.4)
EOSINOPHIL NFR BLD: 2 % (ref 0–7)
ERYTHROCYTE [DISTWIDTH] IN BLOOD BY AUTOMATED COUNT: 15.9 % (ref 11.5–14.5)
ERYTHROCYTE [SEDIMENTATION RATE] IN BLOOD: 67 MM/HR (ref 0–20)
GLUCOSE SERPL-MCNC: 143 MG/DL (ref 65–100)
HCT VFR BLD AUTO: 35.1 % (ref 36.6–50.3)
HGB BLD-MCNC: 11 G/DL (ref 12.1–17)
IMM GRANULOCYTES # BLD AUTO: 0 K/UL (ref 0–0.04)
IMM GRANULOCYTES NFR BLD AUTO: 0 % (ref 0–0.5)
LYMPHOCYTES # BLD: 2.7 K/UL (ref 0.8–3.5)
LYMPHOCYTES NFR BLD: 37 % (ref 12–49)
MCH RBC QN AUTO: 27.8 PG (ref 26–34)
MCHC RBC AUTO-ENTMCNC: 31.3 G/DL (ref 30–36.5)
MCV RBC AUTO: 88.6 FL (ref 80–99)
MONOCYTES # BLD: 0.6 K/UL (ref 0–1)
MONOCYTES NFR BLD: 8 % (ref 5–13)
NEUTS SEG # BLD: 3.9 K/UL (ref 1.8–8)
NEUTS SEG NFR BLD: 52 % (ref 32–75)
NRBC # BLD: 0 K/UL (ref 0–0.01)
NRBC BLD-RTO: 0 PER 100 WBC
PLATELET # BLD AUTO: 155 K/UL (ref 150–400)
PMV BLD AUTO: 9.8 FL (ref 8.9–12.9)
POTASSIUM SERPL-SCNC: 4.7 MMOL/L (ref 3.5–5.1)
RBC # BLD AUTO: 3.96 M/UL (ref 4.1–5.7)
SODIUM SERPL-SCNC: 137 MMOL/L (ref 136–145)
WBC # BLD AUTO: 7.4 K/UL (ref 4.1–11.1)

## 2019-11-04 PROCEDURE — A4216 STERILE WATER/SALINE, 10 ML: HCPCS

## 2019-11-04 PROCEDURE — 85025 COMPLETE CBC W/AUTO DIFF WBC: CPT

## 2019-11-04 PROCEDURE — 36415 COLL VENOUS BLD VENIPUNCTURE: CPT

## 2019-11-04 PROCEDURE — 80048 BASIC METABOLIC PNL TOTAL CA: CPT

## 2019-11-04 PROCEDURE — A6255 ABSORPT DRG >16<=48 IN W/BDR: HCPCS

## 2019-11-04 PROCEDURE — 74011250636 HC RX REV CODE- 250/636: Performed by: INTERNAL MEDICINE

## 2019-11-04 PROCEDURE — 85652 RBC SED RATE AUTOMATED: CPT

## 2019-11-04 PROCEDURE — 3331090001 HH PPS REVENUE CREDIT

## 2019-11-04 PROCEDURE — 3331090002 HH PPS REVENUE DEBIT

## 2019-11-04 PROCEDURE — 96374 THER/PROPH/DIAG INJ IV PUSH: CPT

## 2019-11-04 PROCEDURE — 74011000250 HC RX REV CODE- 250: Performed by: INTERNAL MEDICINE

## 2019-11-04 PROCEDURE — 82550 ASSAY OF CK (CPK): CPT

## 2019-11-04 RX ADMIN — DAPTOMYCIN 600 MG: 500 INJECTION, POWDER, LYOPHILIZED, FOR SOLUTION INTRAVENOUS at 16:28

## 2019-11-04 NOTE — CDMP QUERY
Dr. Alesha Martinez, The medical record reflects the following clinical findings: (operative report 10/30/19) The metal surfaces were then scrubbed with Betadine scrub and copious irrigation of the metal 
surfaces was performed. The knee was then irrigated with Bactisure. After this irrigation, a final 
irrigation was made with saline and bacitracin solution. After all devitalized tissue had been 
removed, the tibial component was implanted, it was a 20 mm LCCK tibial component. Based on your medical judgment of the clinical indicators and/or documentation outlined above, 
can you please clarify the patient's debridement as? NON EXCISIONAL DEBRIDEMENT (Minor removal of loose fragments with scissors, Scraping, Whirlpool, Pulse lavage, Mechanical irrigation, High pressure irrigation) EXCISIONAL DEBRIDEMENT (definite cutting away of tissue) Specify the depth of tissue that was actually excised ( Down to & including: skin, subcutaneous tissue, fascia, soft tissue, muscle, tendon, bone) The procedural note and/or query response should also describe on excisional debridement's the 
technique, instrument(s) used, nature of tissue removed, and appearance/size of wound pre and 
post excisional debridement. Thank you, Jake Leos, CHIN, RN, CCDS 
CDI Supervisor, [facility] 
Office# [phone]

## 2019-11-04 NOTE — PROGRESS NOTES
Outpatient Infusion Center Short Visit Progress Note    1600 Pt admit to [facility] for daily Daptomycin ambulatory in stable condition. Assessment completed. No new concerns voiced. Double lumen flushed w/ positive blood return; labs drawn and sent for processing. Please review pending lab results in [address] [place] [address]. Patient Vitals for the past 12 hrs:   Temp Pulse Resp BP   11/04/19 1601 97 °F (36.1 °C) 63 18 133/72         Medications:  Daptomycin 600 mg IVP    1635 Pt tolerated treatment well. D/c home ambulatory in no distress. Pt aware of next appointment scheduled for 11/05/2019.

## 2019-11-05 ENCOUNTER — HOSPITAL ENCOUNTER (OUTPATIENT)
Dept: INFUSION THERAPY | Age: 64
Discharge: HOME | End: 2019-11-05
Payer: MEDICARE

## 2019-11-05 VITALS
TEMPERATURE: 97 F | SYSTOLIC BLOOD PRESSURE: 130 MMHG | HEART RATE: 7 BPM | RESPIRATION RATE: 18 BRPM | DIASTOLIC BLOOD PRESSURE: 71 MMHG

## 2019-11-05 PROCEDURE — 74011250636 HC RX REV CODE- 250/636: Performed by: INTERNAL MEDICINE

## 2019-11-05 PROCEDURE — 3331090002 HH PPS REVENUE DEBIT

## 2019-11-05 PROCEDURE — 96374 THER/PROPH/DIAG INJ IV PUSH: CPT

## 2019-11-05 PROCEDURE — 3331090001 HH PPS REVENUE CREDIT

## 2019-11-05 PROCEDURE — 74011000250 HC RX REV CODE- 250: Performed by: INTERNAL MEDICINE

## 2019-11-05 RX ADMIN — DAPTOMYCIN 600 MG: 500 INJECTION, POWDER, LYOPHILIZED, FOR SOLUTION INTRAVENOUS at 17:00

## 2019-11-05 NOTE — PROGRESS NOTES
Outpatient Infusion Center Short Visit Progress Note    1600 Pt admit to [place] for daily Daptomycin ambulatory in stable condition. Assessment completed. No new concerns voiced. Double lumen flushed w/ positive blood return. Patient Vitals for the past 12 hrs:   Temp Pulse Resp BP   11/05/19 1609 97 °F (36.1 °C) (!) 7 18 130/71         Medications:  Daptomycin 600 mg IVP  NS flush    1705. Pt tolerated treatment well. D/c home ambulatory in no distress.

## 2019-11-05 NOTE — DISCHARGE SUMMARY
[address] [place] [address]   [address] [address]    DISCHARGE SUMMARY     Patient: Elissa Frias III                             Medical Record Number: [id number]                : 1955  Age: 71 y.o. Admit Date: 10/30/2019  Discharge Date: 2019  Admission Diagnosis: Infected prosthetic knee joint ([address] Jaime Kelly, Z96.659]  Discharge Diagnosis: INFECTION RIGHT KNEE PROSTHESIS  Procedures: Procedure(s):  RIGHT KNEE REVISION, 1 COMPONENT  Surgeon: Charlene Monroe  Assistants: Glenn  Anesthesia: general  Complications: None     History of Present Illness:  Renu García is a 71 y.o. male with a history of Right knee pain, swelling, and marked loss of function. Despite conservative management and after clinical and radiographic evaluation, it was determined that he would benefit from Procedure(s):  RIGHT KNEE REVISION, 1 COMPONENT, which he consented to undergo after a discussion of the risks, benefits, alternatives, rehab concerns, and potential complications of surgery. Hospital Course:  Elissa Frias III tolerated the procedure well. He was transferred  to the recovery room in stable condition. After a brief stay the patient was then transferred to the Joint Replacement Unit at [address] [place] [address].  On postoperative day #1, the dressing was clean and dry, he was neurovascularly intact. The patient was afebrile and vital signs were stable. Calves were soft and non-tender bilaterally. On postoperative day  # 2, the patient was tolerating a regular diet and making satisfactory progress with physical therapy. Hemoglobin and INR prior to discharge were   Lab Results   Component Value Date/Time    HGB 11.0 (L) 2019 04:05 PM    INR 1.3 (H) 2019 06:08 AM   .  Chiki Whaley III made satisfactory progress with physical therapy and was discharged to Home in stable condition on postoperative day 2.   He was provided with routine postoperative instructions and advised to follow up in my office in 3 weeks following discharge from the hospital.  He was prescribed Coumadin for DVT prophylaxis and oxycodone for post-operative pain. Discharge Medications:  Cannot display discharge medications since this patient is not currently admitted.       Signed by: Cesilia Carvajal MD  11/5/2019

## 2019-11-06 ENCOUNTER — HOME CARE VISIT (OUTPATIENT)
Dept: SCHEDULING | Facility: HOME HEALTH | Age: 64
End: 2019-11-06
Payer: MEDICARE

## 2019-11-06 ENCOUNTER — HOSPITAL ENCOUNTER (OUTPATIENT)
Dept: INFUSION THERAPY | Age: 64
Discharge: HOME | End: 2019-11-06
Payer: MEDICARE

## 2019-11-06 VITALS
RESPIRATION RATE: 16 BRPM | TEMPERATURE: 98.8 F | SYSTOLIC BLOOD PRESSURE: 124 MMHG | OXYGEN SATURATION: 98 % | HEART RATE: 69 BPM | DIASTOLIC BLOOD PRESSURE: 68 MMHG

## 2019-11-06 VITALS
SYSTOLIC BLOOD PRESSURE: 124 MMHG | TEMPERATURE: 96.9 F | DIASTOLIC BLOOD PRESSURE: 65 MMHG | HEART RATE: 73 BPM | OXYGEN SATURATION: 96 % | RESPIRATION RATE: 16 BRPM

## 2019-11-06 PROCEDURE — 3331090001 HH PPS REVENUE CREDIT

## 2019-11-06 PROCEDURE — G0299 HHS/HOSPICE OF RN EA 15 MIN: HCPCS

## 2019-11-06 PROCEDURE — 74011000250 HC RX REV CODE- 250: Performed by: INTERNAL MEDICINE

## 2019-11-06 PROCEDURE — 74011250636 HC RX REV CODE- 250/636: Performed by: INTERNAL MEDICINE

## 2019-11-06 PROCEDURE — 96374 THER/PROPH/DIAG INJ IV PUSH: CPT

## 2019-11-06 PROCEDURE — 3331090002 HH PPS REVENUE DEBIT

## 2019-11-06 RX ORDER — HEPARIN 100 UNIT/ML
500 SYRINGE INTRAVENOUS AS NEEDED
Status: DISCONTINUED | OUTPATIENT
Start: 2019-11-06 | End: 2019-11-07 | Stop reason: HOSPADM

## 2019-11-06 RX ORDER — SODIUM CHLORIDE 0.9 % (FLUSH) 0.9 %
5-10 SYRINGE (ML) INJECTION AS NEEDED
Status: DISCONTINUED | OUTPATIENT
Start: 2019-11-06 | End: 2019-11-07 | Stop reason: HOSPADM

## 2019-11-06 RX ADMIN — Medication 10 ML: at 16:59

## 2019-11-06 RX ADMIN — DAPTOMYCIN 600 MG: 500 INJECTION, POWDER, LYOPHILIZED, FOR SOLUTION INTRAVENOUS at 16:55

## 2019-11-06 RX ADMIN — Medication 10 ML: at 17:00

## 2019-11-06 RX ADMIN — Medication 10 ML: at 16:46

## 2019-11-06 RX ADMIN — Medication 10 ML: at 16:45

## 2019-11-06 RX ADMIN — HEPARIN 500 UNITS: 100 SYRINGE at 16:59

## 2019-11-06 RX ADMIN — HEPARIN 500 UNITS: 100 SYRINGE at 17:00

## 2019-11-06 NOTE — PROGRESS NOTES
Outpatient Infusion Center Short Visit Progress Note    8664 Patient admitted to [facility] for Daptomycin ambulatory via cane in stable condition. Assessment completed. No new concerns voiced. Double lumen line flushed with positive blood return. Vital Signs:  Visit Vitals  /65 (BP 1 Location: Right arm, BP Patient Position: Sitting)   Pulse 73   Temp 96.9 °F (36.1 °C)   Resp 16   SpO2 96%     Medications:  Medications Administered     DAPTOmycin (CUBICIN) 600 mg in sterile water (preservative free) 12 mL IV syringe RF formulation     Admin Date  11/06/2019 Action  Given Dose  600 mg Route  IntraVENous Administered By  Atiya Murillo RN          heparin (porcine) pf 500 Units     Admin Date  11/06/2019 Action  Given Dose  500 Units Route  IntraVENous Administered By  Atiya Murillo RN           Admin Date  11/06/2019 Action  Given Dose  500 Units Route  IntraVENous Administered By  Atiya Murillo RN          sodium chloride (NS) flush 5-10 mL     Admin Date  11/06/2019 Action  Given Dose  10 mL Route  IntraVENous Administered By  Atiya Murillo RN           Admin Date  11/06/2019 Action  Given Dose  10 mL Route  IntraVENous Administered By  Atiya Murillo RN           Admin Date  11/06/2019 Action  Given Dose  10 mL Route  IntraVENous Administered By  Atiya Murillo RN           Admin Date  11/06/2019 Action  Given Dose  10 mL Route  IntraVENous Administered By  Atiya Murillo RN              1700 Patient tolerated treatment well. Double lumen line flushed with positive blood return and heparinized. Patient discharged from [place] [address] ambulatory in no distress at 1700. Patient aware of next appointment.     Future Appointments   Date Time Provider Laine Roper   11/7/2019  4:00 PM BREMO FT CHAIR 1 [facility]    11/8/2019 To Be Determined HINA Huerta   11/8/2019  4:00 PM BREMO FT CHAIR 1 [facility]    11/9/2019 12:00 PM [facility] FT CHAIR 1 [facility]  11/10/2019 12:00 PM [facility] FT CHAIR [facility]   11/11/2019  4:00 PM [facility]MO FT CHAIR [facility]   11/12/2019 To Be Determined Mark Perez RN [facility] [address]   11/12/2019  4:00 PM BREMO FT CHAIR [facility]   11/13/2019  4:00 PM BREMO FT CHAIR [facility]   11/14/2019  4:00 PM BREMO FT CHAIR [facility]   11/14/2019 To Be Determined Mark Perez RN [facility]   11/15/2019  4:00 PM BREMO FT CHAIR [facility]   11/16/2019  9:30 AM BREMO FT CHAIR 1 [facility]   11/17/2019  9:30 AM BREMO FT CHAIR [facility]   11/18/2019  4:00 PM BREMO FT CHAIR [facility]   11/19/2019  4:00 PM BREMO FT CHAIR 1 [facility]

## 2019-11-07 ENCOUNTER — HOSPITAL ENCOUNTER (OUTPATIENT)
Dept: INFUSION THERAPY | Age: 64
Discharge: HOME | End: 2019-11-07
Payer: MEDICARE

## 2019-11-07 VITALS
SYSTOLIC BLOOD PRESSURE: 120 MMHG | DIASTOLIC BLOOD PRESSURE: 65 MMHG | HEART RATE: 69 BPM | RESPIRATION RATE: 16 BRPM | TEMPERATURE: 97.5 F

## 2019-11-07 LAB
ANION GAP SERPL CALC-SCNC: 7 MMOL/L (ref 5–15)
BASOPHILS # BLD: 0.1 K/UL (ref 0–0.1)
BASOPHILS NFR BLD: 1 % (ref 0–1)
BUN SERPL-MCNC: 30 MG/DL (ref 6–20)
BUN/CREAT SERPL: 29 (ref 12–20)
CALCIUM SERPL-MCNC: 9.1 MG/DL (ref 8.5–10.1)
CHLORIDE SERPL-SCNC: 104 MMOL/L (ref 97–108)
CK SERPL-CCNC: 43 U/L (ref 39–308)
CO2 SERPL-SCNC: 25 MMOL/L (ref 21–32)
CREAT SERPL-MCNC: 1.04 MG/DL (ref 0.7–1.3)
DIFFERENTIAL METHOD BLD: ABNORMAL
EOSINOPHIL # BLD: 0.2 K/UL (ref 0–0.4)
EOSINOPHIL NFR BLD: 2 % (ref 0–7)
ERYTHROCYTE [DISTWIDTH] IN BLOOD BY AUTOMATED COUNT: 16.1 % (ref 11.5–14.5)
GLUCOSE SERPL-MCNC: 152 MG/DL (ref 65–100)
HCT VFR BLD AUTO: 34.9 % (ref 36.6–50.3)
HGB BLD-MCNC: 10.6 G/DL (ref 12.1–17)
IMM GRANULOCYTES # BLD AUTO: 0 K/UL (ref 0–0.04)
IMM GRANULOCYTES NFR BLD AUTO: 0 % (ref 0–0.5)
LYMPHOCYTES # BLD: 2.7 K/UL (ref 0.8–3.5)
LYMPHOCYTES NFR BLD: 34 % (ref 12–49)
MCH RBC QN AUTO: 27.5 PG (ref 26–34)
MCHC RBC AUTO-ENTMCNC: 30.4 G/DL (ref 30–36.5)
MCV RBC AUTO: 90.4 FL (ref 80–99)
MONOCYTES # BLD: 0.6 K/UL (ref 0–1)
MONOCYTES NFR BLD: 8 % (ref 5–13)
NEUTS SEG # BLD: 4.2 K/UL (ref 1.8–8)
NEUTS SEG NFR BLD: 55 % (ref 32–75)
NRBC # BLD: 0 K/UL (ref 0–0.01)
NRBC BLD-RTO: 0 PER 100 WBC
PLATELET # BLD AUTO: 162 K/UL (ref 150–400)
PMV BLD AUTO: 10.1 FL (ref 8.9–12.9)
POTASSIUM SERPL-SCNC: 4.6 MMOL/L (ref 3.5–5.1)
RBC # BLD AUTO: 3.86 M/UL (ref 4.1–5.7)
SODIUM SERPL-SCNC: 136 MMOL/L (ref 136–145)
WBC # BLD AUTO: 7.8 K/UL (ref 4.1–11.1)

## 2019-11-07 PROCEDURE — 3331090002 HH PPS REVENUE DEBIT

## 2019-11-07 PROCEDURE — 74011250636 HC RX REV CODE- 250/636: Performed by: INTERNAL MEDICINE

## 2019-11-07 PROCEDURE — 96374 THER/PROPH/DIAG INJ IV PUSH: CPT

## 2019-11-07 PROCEDURE — 85025 COMPLETE CBC W/AUTO DIFF WBC: CPT

## 2019-11-07 PROCEDURE — 82550 ASSAY OF CK (CPK): CPT

## 2019-11-07 PROCEDURE — 36415 COLL VENOUS BLD VENIPUNCTURE: CPT

## 2019-11-07 PROCEDURE — 74011000250 HC RX REV CODE- 250: Performed by: INTERNAL MEDICINE

## 2019-11-07 PROCEDURE — 80048 BASIC METABOLIC PNL TOTAL CA: CPT

## 2019-11-07 PROCEDURE — 36592 COLLECT BLOOD FROM PICC: CPT

## 2019-11-07 PROCEDURE — 3331090001 HH PPS REVENUE CREDIT

## 2019-11-07 RX ADMIN — DAPTOMYCIN 600 MG: 500 INJECTION, POWDER, LYOPHILIZED, FOR SOLUTION INTRAVENOUS at 16:46

## 2019-11-07 NOTE — PROGRESS NOTES
Outpatient Infusion Center Progress Note    1600 Pt admit to [facility] for IV DAPTOMYCIN ambulatory in stable condition. Assessment completed. No new concerns voiced. Laura Catheter in rt chest clean and dry, Labs drawn and sent for processing,    Visit Vitals  /65   Pulse 69   Temp 97.5 °F (36.4 °C)   Resp 16       Medications:  Medications Administered     DAPTOmycin (CUBICIN) 600 mg in sterile water (preservative free) 12 mL IV syringe RF formulation     Admin Date  11/07/2019 Action  Given Dose  600 mg Route  IntraVENous Administered By  Joan Gracia, RN                8311 Pt tolerated treatment well. D/c home ambulatory in no distress. Pt aware of next appointment scheduled for 11-8-19.

## 2019-11-08 ENCOUNTER — HOSPITAL ENCOUNTER (OUTPATIENT)
Dept: INFUSION THERAPY | Age: 64
Discharge: HOME | End: 2019-11-08
Payer: MEDICARE

## 2019-11-08 ENCOUNTER — HOME CARE VISIT (OUTPATIENT)
Dept: SCHEDULING | Facility: HOME HEALTH | Age: 64
End: 2019-11-08
Payer: MEDICARE

## 2019-11-08 VITALS
HEART RATE: 66 BPM | RESPIRATION RATE: 16 BRPM | SYSTOLIC BLOOD PRESSURE: 122 MMHG | DIASTOLIC BLOOD PRESSURE: 65 MMHG | TEMPERATURE: 97.5 F

## 2019-11-08 PROCEDURE — 74011250636 HC RX REV CODE- 250/636: Performed by: INTERNAL MEDICINE

## 2019-11-08 PROCEDURE — 3331090001 HH PPS REVENUE CREDIT

## 2019-11-08 PROCEDURE — 74011000250 HC RX REV CODE- 250: Performed by: INTERNAL MEDICINE

## 2019-11-08 PROCEDURE — 96374 THER/PROPH/DIAG INJ IV PUSH: CPT

## 2019-11-08 PROCEDURE — 3331090002 HH PPS REVENUE DEBIT

## 2019-11-08 RX ADMIN — DAPTOMYCIN 600 MG: 500 INJECTION, POWDER, LYOPHILIZED, FOR SOLUTION INTRAVENOUS at 16:13

## 2019-11-08 NOTE — PROGRESS NOTES
Outpatient Infusion Center Progress Note    1610. Pt admit to [facility] for IV DAPTOMYCIN ambulatory in stable condition. Assessment completed. No new concerns voiced. Laura Catheter in rt chest clean and dry, dressing changed per policy. Patient Vitals for the past 12 hrs:   Temp Pulse Resp BP   11/08/19 1610 97.5 °F (36.4 °C) 66 16 122/65       Medications Administered     DAPTOmycin (CUBICIN) 600 mg in sterile water (preservative free) 12 mL IV syringe RF formulation     Admin Date  11/08/2019 Action  Given Dose  600 mg Route  IntraVENous Administered By  Edwin Hernandez, RN                8511. Pt tolerated treatment well. D/c home ambulatory in no distress.

## 2019-11-09 ENCOUNTER — HOSPITAL ENCOUNTER (OUTPATIENT)
Dept: INFUSION THERAPY | Age: 64
Discharge: HOME | End: 2019-11-09
Payer: MEDICARE

## 2019-11-09 VITALS
RESPIRATION RATE: 16 BRPM | SYSTOLIC BLOOD PRESSURE: 130 MMHG | TEMPERATURE: 97.8 F | DIASTOLIC BLOOD PRESSURE: 63 MMHG | HEART RATE: 71 BPM

## 2019-11-09 PROCEDURE — 3331090001 HH PPS REVENUE CREDIT

## 2019-11-09 PROCEDURE — 96374 THER/PROPH/DIAG INJ IV PUSH: CPT

## 2019-11-09 PROCEDURE — 74011250636 HC RX REV CODE- 250/636: Performed by: INTERNAL MEDICINE

## 2019-11-09 PROCEDURE — 3331090002 HH PPS REVENUE DEBIT

## 2019-11-09 PROCEDURE — 74011000250 HC RX REV CODE- 250: Performed by: INTERNAL MEDICINE

## 2019-11-09 RX ORDER — SODIUM CHLORIDE 9 MG/ML
10 INJECTION INTRAMUSCULAR; INTRAVENOUS; SUBCUTANEOUS AS NEEDED
Status: DISCONTINUED | OUTPATIENT
Start: 2019-11-09 | End: 2019-11-10 | Stop reason: HOSPADM

## 2019-11-09 RX ORDER — HEPARIN 100 UNIT/ML
500 SYRINGE INTRAVENOUS AS NEEDED
Status: DISCONTINUED | OUTPATIENT
Start: 2019-11-09 | End: 2019-11-10 | Stop reason: HOSPADM

## 2019-11-09 RX ADMIN — SODIUM CHLORIDE 10 ML: 9 INJECTION INTRAMUSCULAR; INTRAVENOUS; SUBCUTANEOUS at 08:24

## 2019-11-09 RX ADMIN — Medication 500 UNITS: at 08:21

## 2019-11-09 RX ADMIN — DAPTOMYCIN 600 MG: 500 INJECTION, POWDER, LYOPHILIZED, FOR SOLUTION INTRAVENOUS at 08:19

## 2019-11-09 RX ADMIN — SODIUM CHLORIDE 10 ML: 9 INJECTION INTRAMUSCULAR; INTRAVENOUS; SUBCUTANEOUS at 08:21

## 2019-11-09 RX ADMIN — Medication 500 UNITS: at 08:24

## 2019-11-09 NOTE — PROGRESS NOTES
[facility] Short Note                       Date: 2019    Name: Hardik Fang    MRN: [id number]         : 1955      0815 Pt admit to [facility] for daily Daptomycin. Pt ambulatory in stable condition. Assessment completed. No new concerns voiced. Mr. Valerie Rivas vitals were reviewed prior to and after treatment. Patient Vitals for the past 12 hrs:   Temp Pulse Resp BP   19 0816 97.8 °F (36.6 °C) 71 16 130/63     Medications given:   Medications Administered     DAPTOmycin (CUBICIN) 600 mg in sterile water (preservative free) 12 mL IV syringe RF formulation     Admin Date  2019 Action  Given Dose  600 mg Route  IntraVENous Administered By  Annetta Weller RN          heparin (porcine) pf 500 Units     Admin Date  2019 Action  Given Dose  500 Units Route  IntraVENous Administered By  Annetta Weller RN           Admin Date  2019 Action  Given Dose  500 Units Route  IntraVENous Administered By  Annetta Weller RN          sodium chloride 0.9% injection 10 mL     Admin Date  2019 Action  Given Dose  10 mL Route  IntraVENous Administered By  Annetta Weller RN           Admin Date  2019 Action  Given Dose  10 mL Route  IntraVENous Administered By  Annetta Weller RN                PICC flushed, heparinized and capped per protocol. Mr. Gurdeep Smith tolerated the infusion, had no complaints, and was discharged from [place] [address] in stable condition at [address] [place] [address].      Future Appointments   Date Time Provider Laine Roper   2019 12:00 PM BREMO FT CHAIR 1 [facility]    11/10/2019 12:00 PM [facility]   2019 To Be Determined Elbert Ann RN [address]   2019  4:00 PM [facility]   2019  4:00 PM BREMO FT CHAIR 1 [facility]   2019  4:00 PM BREMO FT CHAIR 1 [facility]   2019 To Be Determined Elbert Ann RN [address] 11/14/2019  4:00 PM BREMO FT [facility]   11/15/2019  4:00 PM [facility]   11/16/2019  9:30 AM BREMO FT CHAIR 1 [facility]   11/17/2019  9:30 AM BREMO FT CHAIR 1 [facility]   11/18/2019  4:00 PM BREMO FT CHAIR 1 [facility]   11/19/2019  4:00 PM BREMO FT CHAIR 1 [facility]       Rehan Stanley RN  November 9, 2019  8:30 AM

## 2019-11-10 ENCOUNTER — HOSPITAL ENCOUNTER (OUTPATIENT)
Dept: INFUSION THERAPY | Age: 64
Discharge: HOME | End: 2019-11-10
Payer: MEDICARE

## 2019-11-10 VITALS
TEMPERATURE: 98.1 F | DIASTOLIC BLOOD PRESSURE: 67 MMHG | RESPIRATION RATE: 18 BRPM | SYSTOLIC BLOOD PRESSURE: 121 MMHG | HEART RATE: 67 BPM

## 2019-11-10 PROCEDURE — 74011000250 HC RX REV CODE- 250: Performed by: INTERNAL MEDICINE

## 2019-11-10 PROCEDURE — 74011250636 HC RX REV CODE- 250/636: Performed by: INTERNAL MEDICINE

## 2019-11-10 PROCEDURE — 96374 THER/PROPH/DIAG INJ IV PUSH: CPT

## 2019-11-10 PROCEDURE — 3331090001 HH PPS REVENUE CREDIT

## 2019-11-10 PROCEDURE — 3331090002 HH PPS REVENUE DEBIT

## 2019-11-10 RX ORDER — SODIUM CHLORIDE 0.9 % (FLUSH) 0.9 %
5-10 SYRINGE (ML) INJECTION AS NEEDED
Status: DISCONTINUED | OUTPATIENT
Start: 2019-11-10 | End: 2019-11-11 | Stop reason: HOSPADM

## 2019-11-10 RX ORDER — HEPARIN 100 UNIT/ML
500 SYRINGE INTRAVENOUS AS NEEDED
Status: DISCONTINUED | OUTPATIENT
Start: 2019-11-10 | End: 2019-11-11 | Stop reason: HOSPADM

## 2019-11-10 RX ADMIN — DAPTOMYCIN 600 MG: 500 INJECTION, POWDER, LYOPHILIZED, FOR SOLUTION INTRAVENOUS at 11:20

## 2019-11-10 NOTE — PROGRESS NOTES
Outpatient Infusion Center Short Visit Progress Note    2881 Pt admit to [facility] for daily Daptomycin ambulatory in stable condition. Assessment completed. No new concerns voiced. Double lumen line flushed w/ positive blood return. Patient Vitals for the past 12 hrs:   Temp Pulse Resp BP   11/10/19 1118 98.1 °F (36.7 °C) 67 18 121/67       Medications:  Daptomycin 600 mg IVP    1130 Pt tolerated treatment well. D/c home ambulatory in no distress. Pt aware of next appointment scheduled for 11/11/2019.

## 2019-11-11 ENCOUNTER — HOSPITAL ENCOUNTER (OUTPATIENT)
Dept: INFUSION THERAPY | Age: 64
Discharge: HOME | End: 2019-11-11
Payer: MEDICARE

## 2019-11-11 VITALS
TEMPERATURE: 97.9 F | DIASTOLIC BLOOD PRESSURE: 73 MMHG | SYSTOLIC BLOOD PRESSURE: 141 MMHG | RESPIRATION RATE: 18 BRPM | HEART RATE: 84 BPM

## 2019-11-11 LAB
ANION GAP SERPL CALC-SCNC: 6 MMOL/L (ref 5–15)
BASOPHILS # BLD: 0.1 K/UL (ref 0–0.1)
BASOPHILS NFR BLD: 1 % (ref 0–1)
BUN SERPL-MCNC: 30 MG/DL (ref 6–20)
BUN/CREAT SERPL: 27 (ref 12–20)
CALCIUM SERPL-MCNC: 8.8 MG/DL (ref 8.5–10.1)
CHLORIDE SERPL-SCNC: 105 MMOL/L (ref 97–108)
CK SERPL-CCNC: 75 U/L (ref 39–308)
CO2 SERPL-SCNC: 24 MMOL/L (ref 21–32)
CREAT SERPL-MCNC: 1.12 MG/DL (ref 0.7–1.3)
DIFFERENTIAL METHOD BLD: ABNORMAL
EOSINOPHIL # BLD: 0.1 K/UL (ref 0–0.4)
EOSINOPHIL NFR BLD: 2 % (ref 0–7)
ERYTHROCYTE [DISTWIDTH] IN BLOOD BY AUTOMATED COUNT: 16 % (ref 11.5–14.5)
ERYTHROCYTE [SEDIMENTATION RATE] IN BLOOD: 58 MM/HR (ref 0–20)
GLUCOSE SERPL-MCNC: 160 MG/DL (ref 65–100)
HCT VFR BLD AUTO: 35.5 % (ref 36.6–50.3)
HGB BLD-MCNC: 10.9 G/DL (ref 12.1–17)
IMM GRANULOCYTES # BLD AUTO: 0 K/UL (ref 0–0.04)
IMM GRANULOCYTES NFR BLD AUTO: 0 % (ref 0–0.5)
LYMPHOCYTES # BLD: 2.2 K/UL (ref 0.8–3.5)
LYMPHOCYTES NFR BLD: 30 % (ref 12–49)
MCH RBC QN AUTO: 27.5 PG (ref 26–34)
MCHC RBC AUTO-ENTMCNC: 30.7 G/DL (ref 30–36.5)
MCV RBC AUTO: 89.6 FL (ref 80–99)
MONOCYTES # BLD: 0.5 K/UL (ref 0–1)
MONOCYTES NFR BLD: 7 % (ref 5–13)
NEUTS SEG # BLD: 4.4 K/UL (ref 1.8–8)
NEUTS SEG NFR BLD: 60 % (ref 32–75)
NRBC # BLD: 0 K/UL (ref 0–0.01)
NRBC BLD-RTO: 0 PER 100 WBC
PLATELET # BLD AUTO: 143 K/UL (ref 150–400)
PMV BLD AUTO: 9.7 FL (ref 8.9–12.9)
POTASSIUM SERPL-SCNC: 4.4 MMOL/L (ref 3.5–5.1)
RBC # BLD AUTO: 3.96 M/UL (ref 4.1–5.7)
SODIUM SERPL-SCNC: 135 MMOL/L (ref 136–145)
WBC # BLD AUTO: 7.3 K/UL (ref 4.1–11.1)

## 2019-11-11 PROCEDURE — 74011250636 HC RX REV CODE- 250/636: Performed by: INTERNAL MEDICINE

## 2019-11-11 PROCEDURE — 85025 COMPLETE CBC W/AUTO DIFF WBC: CPT

## 2019-11-11 PROCEDURE — 85652 RBC SED RATE AUTOMATED: CPT

## 2019-11-11 PROCEDURE — 96374 THER/PROPH/DIAG INJ IV PUSH: CPT

## 2019-11-11 PROCEDURE — 3331090001 HH PPS REVENUE CREDIT

## 2019-11-11 PROCEDURE — 80048 BASIC METABOLIC PNL TOTAL CA: CPT

## 2019-11-11 PROCEDURE — 74011000250 HC RX REV CODE- 250: Performed by: INTERNAL MEDICINE

## 2019-11-11 PROCEDURE — 82550 ASSAY OF CK (CPK): CPT

## 2019-11-11 PROCEDURE — 36415 COLL VENOUS BLD VENIPUNCTURE: CPT

## 2019-11-11 PROCEDURE — 74011250636 HC RX REV CODE- 250/636

## 2019-11-11 PROCEDURE — 3331090002 HH PPS REVENUE DEBIT

## 2019-11-11 RX ORDER — HEPARIN 100 UNIT/ML
500 SYRINGE INTRAVENOUS AS NEEDED
Status: DISCONTINUED | OUTPATIENT
Start: 2019-11-11 | End: 2019-11-12 | Stop reason: HOSPADM

## 2019-11-11 RX ORDER — SODIUM CHLORIDE 0.9 % (FLUSH) 0.9 %
5-10 SYRINGE (ML) INJECTION AS NEEDED
Status: DISCONTINUED | OUTPATIENT
Start: 2019-11-11 | End: 2019-11-12 | Stop reason: HOSPADM

## 2019-11-11 RX ADMIN — Medication 10 ML: at 16:39

## 2019-11-11 RX ADMIN — Medication 500 UNITS: at 16:44

## 2019-11-11 RX ADMIN — Medication 10 ML: at 16:16

## 2019-11-11 RX ADMIN — Medication 500 UNITS: at 16:17

## 2019-11-11 RX ADMIN — Medication 10 ML: at 16:43

## 2019-11-11 RX ADMIN — DAPTOMYCIN 600 MG: 500 INJECTION, POWDER, LYOPHILIZED, FOR SOLUTION INTRAVENOUS at 16:40

## 2019-11-11 RX ADMIN — Medication 10 ML: at 16:15

## 2019-11-11 NOTE — PROGRESS NOTES
Outpatient Infusion Center Short Visit Progress Note    1600 Pt admit to [facility] for Daptomycin/labs ambulatory in stable condition. Assessment completed. No new concerns voiced, double lumen midline flushes well with good blood return, labs drawn and sent for processing. Patient Vitals for the past 12 hrs:   Temp Pulse Resp BP   11/11/19 1802 97.9 °F (36.6 °C) 84 18 141/73       Medications:  Medications Administered     DAPTOmycin (CUBICIN) 600 mg in sterile water (preservative free) 12 mL IV syringe RF formulation     Admin Date  11/11/2019 Action  Given Dose  600 mg Route  IntraVENous Administered By  Tahir Uche A          heparin (porcine) pf 500 Units     Admin Date  11/11/2019 Action  Given Dose  500 Units Route  IntraVENous Administered By  [facility] Date  11/11/2019 Action  Given Dose  500 Units Route  IntraVENous Administered By  Tahir Uche A          sodium chloride (NS) flush 5-10 mL     Admin Date  11/11/2019 Action  Given Dose  10 mL Route  IntraVENous Administered By  [facility] Date  11/11/2019 Action  Given Dose  10 mL Route  IntraVENous Administered By  Marshal Cowing Date  11/11/2019 Action  Given Dose  10 mL Route  IntraVENous Administered By  Marshal Cowing Date  11/11/2019 Action  Given Dose  10 mL Route  IntraVENous Administered By  Tahir Uche A                  7308 Pt tolerated treatment well, midline flushed, heparinized and capped, D/c home ambulatory in no distress. Pt aware of next appointment scheduled for 11/12/19.

## 2019-11-12 ENCOUNTER — HOME CARE VISIT (OUTPATIENT)
Dept: SCHEDULING | Facility: HOME HEALTH | Age: 64
End: 2019-11-12
Payer: MEDICARE

## 2019-11-12 ENCOUNTER — HOSPITAL ENCOUNTER (OUTPATIENT)
Dept: INFUSION THERAPY | Age: 64
Discharge: HOME | End: 2019-11-12
Payer: MEDICARE

## 2019-11-12 VITALS
RESPIRATION RATE: 16 BRPM | DIASTOLIC BLOOD PRESSURE: 60 MMHG | TEMPERATURE: 98.7 F | SYSTOLIC BLOOD PRESSURE: 108 MMHG | OXYGEN SATURATION: 98 % | HEART RATE: 67 BPM

## 2019-11-12 VITALS
SYSTOLIC BLOOD PRESSURE: 127 MMHG | TEMPERATURE: 97.7 F | RESPIRATION RATE: 18 BRPM | HEART RATE: 65 BPM | DIASTOLIC BLOOD PRESSURE: 62 MMHG

## 2019-11-12 PROCEDURE — G0299 HHS/HOSPICE OF RN EA 15 MIN: HCPCS

## 2019-11-12 PROCEDURE — 74011000250 HC RX REV CODE- 250: Performed by: INTERNAL MEDICINE

## 2019-11-12 PROCEDURE — 74011250636 HC RX REV CODE- 250/636: Performed by: INTERNAL MEDICINE

## 2019-11-12 PROCEDURE — 96374 THER/PROPH/DIAG INJ IV PUSH: CPT

## 2019-11-12 PROCEDURE — 3331090002 HH PPS REVENUE DEBIT

## 2019-11-12 PROCEDURE — 3331090001 HH PPS REVENUE CREDIT

## 2019-11-12 RX ORDER — SODIUM CHLORIDE 0.9 % (FLUSH) 0.9 %
10 SYRINGE (ML) INJECTION AS NEEDED
Status: DISCONTINUED | OUTPATIENT
Start: 2019-11-12 | End: 2019-11-13 | Stop reason: HOSPADM

## 2019-11-12 RX ORDER — HEPARIN 100 UNIT/ML
500 SYRINGE INTRAVENOUS AS NEEDED
Status: DISCONTINUED | OUTPATIENT
Start: 2019-11-12 | End: 2019-11-13 | Stop reason: HOSPADM

## 2019-11-12 RX ADMIN — Medication 10 ML: at 16:47

## 2019-11-12 RX ADMIN — DAPTOMYCIN 600 MG: 500 INJECTION, POWDER, LYOPHILIZED, FOR SOLUTION INTRAVENOUS at 16:45

## 2019-11-12 RX ADMIN — Medication 10 ML: at 15:57

## 2019-11-12 RX ADMIN — Medication 500 UNITS: at 16:49

## 2019-11-12 RX ADMIN — Medication 10 ML: at 16:48

## 2019-11-12 RX ADMIN — Medication 500 UNITS: at 16:50

## 2019-11-12 RX ADMIN — Medication 10 ML: at 15:58

## 2019-11-12 NOTE — PROGRESS NOTES
Outpatient Infusion Center Short Visit Progress Note    6932 Pt admit to [facility] for daily Daptomycin ambulatory in stable condition. Assessment completed. No new concerns voiced. Double lumen line flushed w/ positive blood return. Medications:  Daptomycin 600 mg IVP    Blood pressure 127/62, pulse 65, temperature 97.7 °F (36.5 °C), resp. rate 18.    1650 Pt tolerated treatment well. D/c home ambulatory in no distress. Pt aware of next appointment scheduled for 11/13/2019.

## 2019-11-13 ENCOUNTER — HOSPITAL ENCOUNTER (OUTPATIENT)
Dept: INFUSION THERAPY | Age: 64
Discharge: HOME | End: 2019-11-13
Payer: MEDICARE

## 2019-11-13 VITALS
HEART RATE: 72 BPM | TEMPERATURE: 98.1 F | RESPIRATION RATE: 18 BRPM | DIASTOLIC BLOOD PRESSURE: 72 MMHG | SYSTOLIC BLOOD PRESSURE: 128 MMHG

## 2019-11-13 LAB
BACTERIA SPEC CULT: ABNORMAL
GRAM STN SPEC: ABNORMAL
GRAM STN SPEC: ABNORMAL
SERVICE CMNT-IMP: ABNORMAL
SERVICE CMNT-IMP: ABNORMAL

## 2019-11-13 PROCEDURE — 3331090001 HH PPS REVENUE CREDIT

## 2019-11-13 PROCEDURE — 74011000250 HC RX REV CODE- 250: Performed by: INTERNAL MEDICINE

## 2019-11-13 PROCEDURE — 74011250636 HC RX REV CODE- 250/636: Performed by: INTERNAL MEDICINE

## 2019-11-13 PROCEDURE — 96374 THER/PROPH/DIAG INJ IV PUSH: CPT

## 2019-11-13 PROCEDURE — 3331090002 HH PPS REVENUE DEBIT

## 2019-11-13 PROCEDURE — 74011250636 HC RX REV CODE- 250/636

## 2019-11-13 RX ORDER — SODIUM CHLORIDE 0.9 % (FLUSH) 0.9 %
5-10 SYRINGE (ML) INJECTION AS NEEDED
Status: DISCONTINUED | OUTPATIENT
Start: 2019-11-13 | End: 2019-11-14 | Stop reason: HOSPADM

## 2019-11-13 RX ORDER — HEPARIN 100 UNIT/ML
500 SYRINGE INTRAVENOUS AS NEEDED
Status: DISCONTINUED | OUTPATIENT
Start: 2019-11-13 | End: 2019-11-14 | Stop reason: HOSPADM

## 2019-11-13 RX ADMIN — Medication 10 ML: at 16:45

## 2019-11-13 RX ADMIN — Medication 10 ML: at 16:40

## 2019-11-13 RX ADMIN — DAPTOMYCIN 600 MG: 500 INJECTION, POWDER, LYOPHILIZED, FOR SOLUTION INTRAVENOUS at 16:46

## 2019-11-13 RX ADMIN — Medication 500 UNITS: at 16:50

## 2019-11-13 RX ADMIN — Medication 500 UNITS: at 16:41

## 2019-11-13 RX ADMIN — Medication 10 ML: at 16:49

## 2019-11-13 NOTE — PROGRESS NOTES
Outpatient Infusion Center Short Visit Progress Note    1600 Pt admit to [facility] for Daptomycin ambulatory in stable condition. Assessment completed. No new concerns voiced. Double lumen midline patent with good blood return. Patient Vitals for the past 12 hrs:   Temp Pulse Resp BP   11/13/19 1610 98.1 °F (36.7 °C) 72 18 128/72       Medications:  Medications Administered     DAPTOmycin (CUBICIN) 600 mg in sterile water (preservative free) 12 mL IV syringe RF formulation     Admin Date  11/13/2019 Action  Given Dose  600 mg Route  IntraVENous Administered By  Valentino Gonzalez A          heparin (porcine) pf 500 Units     Admin Date  11/13/2019 Action  Given Dose  500 Units Route  IntraVENous Administered By  Jean Grate Date  11/13/2019 Action  Given Dose  500 Units Route  IntraVENous Administered By  Valentino Gonzalez A          sodium chloride (NS) flush 5-10 mL     Admin Date  11/13/2019 Action  Given Dose  10 mL Route  IntraVENous Administered By  Jean Grate Date  11/13/2019 Action  Given Dose  10 mL Route  IntraVENous Administered By  Jean Grate Date  11/13/2019 Action  Given Dose  10 mL Route  IntraVENous Administered By  Valentino Cannon A                  1650 Pt tolerated treatment well, both lumens flushed, heparinized and capped,  D/c home ambulatory in no distress. Pt aware of next appointment scheduled for 11/14/19.

## 2019-11-14 ENCOUNTER — HOSPITAL ENCOUNTER (OUTPATIENT)
Dept: INFUSION THERAPY | Age: 64
Discharge: HOME | End: 2019-11-14
Payer: MEDICARE

## 2019-11-14 VITALS
TEMPERATURE: 98.1 F | DIASTOLIC BLOOD PRESSURE: 62 MMHG | SYSTOLIC BLOOD PRESSURE: 121 MMHG | RESPIRATION RATE: 16 BRPM | HEART RATE: 61 BPM

## 2019-11-14 LAB
ANION GAP SERPL CALC-SCNC: 6 MMOL/L (ref 5–15)
BASOPHILS # BLD: 0.1 K/UL (ref 0–0.1)
BASOPHILS NFR BLD: 1 % (ref 0–1)
BUN SERPL-MCNC: 28 MG/DL (ref 6–20)
BUN/CREAT SERPL: 28 (ref 12–20)
CALCIUM SERPL-MCNC: 9 MG/DL (ref 8.5–10.1)
CHLORIDE SERPL-SCNC: 105 MMOL/L (ref 97–108)
CK MB CFR SERPL CALC: NORMAL % (ref 0–2.5)
CK MB SERPL-MCNC: <1 NG/ML (ref 5–25)
CK SERPL-CCNC: 64 U/L (ref 39–308)
CO2 SERPL-SCNC: 24 MMOL/L (ref 21–32)
CREAT SERPL-MCNC: 1 MG/DL (ref 0.7–1.3)
DIFFERENTIAL METHOD BLD: ABNORMAL
EOSINOPHIL # BLD: 0.2 K/UL (ref 0–0.4)
EOSINOPHIL NFR BLD: 2 % (ref 0–7)
ERYTHROCYTE [DISTWIDTH] IN BLOOD BY AUTOMATED COUNT: 16 % (ref 11.5–14.5)
GLUCOSE SERPL-MCNC: 147 MG/DL (ref 65–100)
HCT VFR BLD AUTO: 35.2 % (ref 36.6–50.3)
HGB BLD-MCNC: 11.1 G/DL (ref 12.1–17)
IMM GRANULOCYTES # BLD AUTO: 0 K/UL (ref 0–0.04)
IMM GRANULOCYTES NFR BLD AUTO: 1 % (ref 0–0.5)
LYMPHOCYTES # BLD: 2.8 K/UL (ref 0.8–3.5)
LYMPHOCYTES NFR BLD: 32 % (ref 12–49)
MCH RBC QN AUTO: 27.6 PG (ref 26–34)
MCHC RBC AUTO-ENTMCNC: 31.5 G/DL (ref 30–36.5)
MCV RBC AUTO: 87.6 FL (ref 80–99)
MONOCYTES # BLD: 0.6 K/UL (ref 0–1)
MONOCYTES NFR BLD: 7 % (ref 5–13)
NEUTS SEG # BLD: 5.1 K/UL (ref 1.8–8)
NEUTS SEG NFR BLD: 57 % (ref 32–75)
NRBC # BLD: 0 K/UL (ref 0–0.01)
NRBC BLD-RTO: 0 PER 100 WBC
PLATELET # BLD AUTO: 153 K/UL (ref 150–400)
PMV BLD AUTO: 9.5 FL (ref 8.9–12.9)
POTASSIUM SERPL-SCNC: 4.5 MMOL/L (ref 3.5–5.1)
RBC # BLD AUTO: 4.02 M/UL (ref 4.1–5.7)
SODIUM SERPL-SCNC: 135 MMOL/L (ref 136–145)
WBC # BLD AUTO: 8.7 K/UL (ref 4.1–11.1)

## 2019-11-14 PROCEDURE — 3331090002 HH PPS REVENUE DEBIT

## 2019-11-14 PROCEDURE — 85025 COMPLETE CBC W/AUTO DIFF WBC: CPT

## 2019-11-14 PROCEDURE — 96374 THER/PROPH/DIAG INJ IV PUSH: CPT

## 2019-11-14 PROCEDURE — 80048 BASIC METABOLIC PNL TOTAL CA: CPT

## 2019-11-14 PROCEDURE — 82550 ASSAY OF CK (CPK): CPT

## 2019-11-14 PROCEDURE — 74011250636 HC RX REV CODE- 250/636: Performed by: INTERNAL MEDICINE

## 2019-11-14 PROCEDURE — 74011000250 HC RX REV CODE- 250: Performed by: INTERNAL MEDICINE

## 2019-11-14 PROCEDURE — 36415 COLL VENOUS BLD VENIPUNCTURE: CPT

## 2019-11-14 PROCEDURE — 3331090001 HH PPS REVENUE CREDIT

## 2019-11-14 RX ADMIN — DAPTOMYCIN 600 MG: 500 INJECTION, POWDER, LYOPHILIZED, FOR SOLUTION INTRAVENOUS at 16:51

## 2019-11-14 NOTE — PROGRESS NOTES
Outpatient Infusion Center Short Visit Progress Note    3410 Pt admit to [facility] for Daptomycin ambulatory in stable condition. Assessment completed. No new concerns voiced. Double lumen midline patent with good blood return. Labs drawn and sent for processing. Patient Vitals for the past 12 hrs:   Temp Pulse Resp BP   11/14/19 1614 98.1 °F (36.7 °C) 61 16 121/62       Medications:  Medications Administered     DAPTOmycin (CUBICIN) 600 mg in sterile water (preservative free) 12 mL IV syringe RF formulation     Admin Date  11/14/2019 Action  Given Dose  600 mg Route  IntraVENous Administered By  Doretha Primrose, RN              3098 Pt tolerated treatment well, both lumens flushed and capped,  D/c home ambulatory in no distress. Pt aware of next appointment scheduled for 11/15/19.

## 2019-11-15 ENCOUNTER — HOME CARE VISIT (OUTPATIENT)
Dept: SCHEDULING | Facility: HOME HEALTH | Age: 64
End: 2019-11-15
Payer: MEDICARE

## 2019-11-15 ENCOUNTER — HOSPITAL ENCOUNTER (OUTPATIENT)
Dept: INFUSION THERAPY | Age: 64
Discharge: HOME | End: 2019-11-15
Payer: MEDICARE

## 2019-11-15 VITALS
RESPIRATION RATE: 16 BRPM | TEMPERATURE: 98.9 F | HEART RATE: 75 BPM | SYSTOLIC BLOOD PRESSURE: 116 MMHG | OXYGEN SATURATION: 98 % | DIASTOLIC BLOOD PRESSURE: 66 MMHG

## 2019-11-15 LAB
INR PPP: 3.2 (ref 0.9–1.1)
PROTHROMBIN TIME: 30.8 SEC (ref 9–11.1)

## 2019-11-15 PROCEDURE — 3331090001 HH PPS REVENUE CREDIT

## 2019-11-15 PROCEDURE — G0299 HHS/HOSPICE OF RN EA 15 MIN: HCPCS

## 2019-11-15 PROCEDURE — 36415 COLL VENOUS BLD VENIPUNCTURE: CPT

## 2019-11-15 PROCEDURE — 3331090002 HH PPS REVENUE DEBIT

## 2019-11-15 PROCEDURE — 74011250636 HC RX REV CODE- 250/636: Performed by: INTERNAL MEDICINE

## 2019-11-15 PROCEDURE — 3331090003 HH PPS REVENUE ADJ

## 2019-11-15 PROCEDURE — 74011000250 HC RX REV CODE- 250: Performed by: INTERNAL MEDICINE

## 2019-11-15 PROCEDURE — 85610 PROTHROMBIN TIME: CPT

## 2019-11-15 PROCEDURE — 96374 THER/PROPH/DIAG INJ IV PUSH: CPT

## 2019-11-15 RX ADMIN — DAPTOMYCIN 600 MG: 500 INJECTION, POWDER, LYOPHILIZED, FOR SOLUTION INTRAVENOUS at 17:19

## 2019-11-15 NOTE — PROGRESS NOTES
Outpatient Infusion Center Short Visit Progress Note    1630. Pt admit to [facility] for Daptomycin ambulatory in stable condition. Assessment completed. No new concerns voiced. Double lumen midline patent with good blood return. Labs drawn and sent for processing. Recent Results (from the past 12 hour(s))   PROTHROMBIN TIME + INR    Collection Time: 11/15/19  4:34 PM   Result Value Ref Range    INR 3.2 (H) 0.9 - 1.1      Prothrombin time 30.8 (H) 9.0 - 11.1 sec       Medications:  Medications Administered     DAPTOmycin (CUBICIN) 600 mg in sterile water (preservative free) 12 mL IV syringe RF formulation     Admin Date  11/15/2019 Action  Given Dose  600 mg Route  IntraVENous Administered By  Julian Cruz, RN              8308. Pt tolerated treatment well, both lumens flushed and capped,  D/c home ambulatory in no distress. Pt aware of next appointment scheduled for 11/15/19.

## 2019-11-16 ENCOUNTER — HOSPITAL ENCOUNTER (OUTPATIENT)
Dept: INFUSION THERAPY | Age: 64
Discharge: HOME | End: 2019-11-16
Payer: MEDICARE

## 2019-11-16 VITALS
DIASTOLIC BLOOD PRESSURE: 72 MMHG | RESPIRATION RATE: 16 BRPM | TEMPERATURE: 98.8 F | HEART RATE: 77 BPM | SYSTOLIC BLOOD PRESSURE: 119 MMHG

## 2019-11-16 PROCEDURE — 96374 THER/PROPH/DIAG INJ IV PUSH: CPT

## 2019-11-16 PROCEDURE — 74011250636 HC RX REV CODE- 250/636: Performed by: INTERNAL MEDICINE

## 2019-11-16 PROCEDURE — 74011000250 HC RX REV CODE- 250: Performed by: INTERNAL MEDICINE

## 2019-11-16 RX ORDER — SODIUM CHLORIDE 0.9 % (FLUSH) 0.9 %
5-10 SYRINGE (ML) INJECTION AS NEEDED
Status: DISCONTINUED | OUTPATIENT
Start: 2019-11-16 | End: 2019-11-17 | Stop reason: HOSPADM

## 2019-11-16 RX ORDER — HEPARIN 100 UNIT/ML
500 SYRINGE INTRAVENOUS AS NEEDED
Status: DISCONTINUED | OUTPATIENT
Start: 2019-11-16 | End: 2019-11-17 | Stop reason: HOSPADM

## 2019-11-16 RX ADMIN — SODIUM CHLORIDE, PRESERVATIVE FREE 500 UNITS: 5 INJECTION INTRAVENOUS at 08:30

## 2019-11-16 RX ADMIN — Medication 10 ML: at 08:20

## 2019-11-16 RX ADMIN — SODIUM CHLORIDE, PRESERVATIVE FREE 500 UNITS: 5 INJECTION INTRAVENOUS at 08:22

## 2019-11-16 RX ADMIN — Medication 10 ML: at 08:22

## 2019-11-16 RX ADMIN — DAPTOMYCIN 600 MG: 500 INJECTION, POWDER, LYOPHILIZED, FOR SOLUTION INTRAVENOUS at 08:22

## 2019-11-16 NOTE — PROGRESS NOTES
[facility] Outpatient Infusion Center Note:  [id number] arrived at [facility] ambulatory and in no distress for daily antibiotic    Assessment stable, no new complaints voiced. Medications received:  Daptomycin    0825 Tolerated treatment well, no adverse reaction noted. D/Cd from [facility] ambulatory and in no distress accompanied by self. Next appt 11/17  Visit Vitals  /72   Pulse 77   Temp 98.8 °F (37.1 °C)   Resp 16     No results found for this or any previous visit (from the past 12 hour(s)).

## 2019-11-17 ENCOUNTER — HOSPITAL ENCOUNTER (OUTPATIENT)
Dept: INFUSION THERAPY | Age: 64
Discharge: HOME | End: 2019-11-17
Payer: MEDICARE

## 2019-11-17 VITALS
TEMPERATURE: 98.1 F | HEART RATE: 69 BPM | DIASTOLIC BLOOD PRESSURE: 62 MMHG | SYSTOLIC BLOOD PRESSURE: 111 MMHG | RESPIRATION RATE: 18 BRPM

## 2019-11-17 PROCEDURE — 96365 THER/PROPH/DIAG IV INF INIT: CPT

## 2019-11-17 PROCEDURE — 74011000250 HC RX REV CODE- 250: Performed by: INTERNAL MEDICINE

## 2019-11-17 PROCEDURE — 74011250636 HC RX REV CODE- 250/636: Performed by: INTERNAL MEDICINE

## 2019-11-17 RX ORDER — SODIUM CHLORIDE 0.9 % (FLUSH) 0.9 %
5-10 SYRINGE (ML) INJECTION AS NEEDED
Status: DISCONTINUED | OUTPATIENT
Start: 2019-11-17 | End: 2019-11-18 | Stop reason: HOSPADM

## 2019-11-17 RX ORDER — HEPARIN 100 UNIT/ML
500 SYRINGE INTRAVENOUS AS NEEDED
Status: DISCONTINUED | OUTPATIENT
Start: 2019-11-17 | End: 2019-11-18 | Stop reason: HOSPADM

## 2019-11-17 RX ADMIN — Medication 10 ML: at 11:46

## 2019-11-17 RX ADMIN — SODIUM CHLORIDE, PRESERVATIVE FREE 500 UNITS: 5 INJECTION INTRAVENOUS at 11:48

## 2019-11-17 RX ADMIN — Medication 10 ML: at 11:47

## 2019-11-17 RX ADMIN — DAPTOMYCIN 600 MG: 500 INJECTION, POWDER, LYOPHILIZED, FOR SOLUTION INTRAVENOUS at 11:54

## 2019-11-17 RX ADMIN — SODIUM CHLORIDE, PRESERVATIVE FREE 500 UNITS: 5 INJECTION INTRAVENOUS at 11:46

## 2019-11-17 NOTE — PROGRESS NOTES
[facility] Outpatient Infusion Center Note:  0120DC arrived at [facility] ambulatory and in no distress for daily antibiotic. Assessment stable, no new complaints voiced. Knee same. Medications received:  Daptomycin    1200 Tolerated treatment well, no adverse reaction noted. D/Cd from [facility] ambulatory and in no distress accompanied by self  Next appt 11/18  Visit Vitals  /62   Pulse 69   Temp 98.1 °F (36.7 °C)   Resp 18     No results found for this or any previous visit (from the past 12 hour(s)).

## 2019-11-18 ENCOUNTER — HOSPITAL ENCOUNTER (OUTPATIENT)
Dept: INFUSION THERAPY | Age: 64
Discharge: HOME | End: 2019-11-18
Payer: MEDICARE

## 2019-11-18 VITALS
SYSTOLIC BLOOD PRESSURE: 127 MMHG | TEMPERATURE: 97.9 F | HEART RATE: 77 BPM | DIASTOLIC BLOOD PRESSURE: 70 MMHG | RESPIRATION RATE: 16 BRPM

## 2019-11-18 LAB
ANION GAP SERPL CALC-SCNC: 8 MMOL/L (ref 5–15)
BASOPHILS # BLD: 0.1 K/UL (ref 0–0.1)
BASOPHILS NFR BLD: 1 % (ref 0–1)
BUN SERPL-MCNC: 26 MG/DL (ref 6–20)
BUN/CREAT SERPL: 22 (ref 12–20)
CALCIUM SERPL-MCNC: 8.5 MG/DL (ref 8.5–10.1)
CHLORIDE SERPL-SCNC: 105 MMOL/L (ref 97–108)
CK SERPL-CCNC: 71 U/L (ref 39–308)
CO2 SERPL-SCNC: 23 MMOL/L (ref 21–32)
CREAT SERPL-MCNC: 1.16 MG/DL (ref 0.7–1.3)
DIFFERENTIAL METHOD BLD: ABNORMAL
EOSINOPHIL # BLD: 0.1 K/UL (ref 0–0.4)
EOSINOPHIL NFR BLD: 2 % (ref 0–7)
ERYTHROCYTE [DISTWIDTH] IN BLOOD BY AUTOMATED COUNT: 16 % (ref 11.5–14.5)
ERYTHROCYTE [SEDIMENTATION RATE] IN BLOOD: 48 MM/HR (ref 0–20)
GLUCOSE SERPL-MCNC: 187 MG/DL (ref 65–100)
HCT VFR BLD AUTO: 34.7 % (ref 36.6–50.3)
HGB BLD-MCNC: 10.9 G/DL (ref 12.1–17)
IMM GRANULOCYTES # BLD AUTO: 0 K/UL (ref 0–0.04)
IMM GRANULOCYTES NFR BLD AUTO: 0 % (ref 0–0.5)
LYMPHOCYTES # BLD: 2.3 K/UL (ref 0.8–3.5)
LYMPHOCYTES NFR BLD: 30 % (ref 12–49)
MCH RBC QN AUTO: 27.8 PG (ref 26–34)
MCHC RBC AUTO-ENTMCNC: 31.4 G/DL (ref 30–36.5)
MCV RBC AUTO: 88.5 FL (ref 80–99)
MONOCYTES # BLD: 0.7 K/UL (ref 0–1)
MONOCYTES NFR BLD: 9 % (ref 5–13)
NEUTS SEG # BLD: 4.4 K/UL (ref 1.8–8)
NEUTS SEG NFR BLD: 58 % (ref 32–75)
NRBC # BLD: 0 K/UL (ref 0–0.01)
NRBC BLD-RTO: 0 PER 100 WBC
PLATELET # BLD AUTO: 162 K/UL (ref 150–400)
PMV BLD AUTO: 10.2 FL (ref 8.9–12.9)
POTASSIUM SERPL-SCNC: 4.6 MMOL/L (ref 3.5–5.1)
RBC # BLD AUTO: 3.92 M/UL (ref 4.1–5.7)
SODIUM SERPL-SCNC: 136 MMOL/L (ref 136–145)
WBC # BLD AUTO: 7.6 K/UL (ref 4.1–11.1)

## 2019-11-18 PROCEDURE — 80048 BASIC METABOLIC PNL TOTAL CA: CPT

## 2019-11-18 PROCEDURE — 85025 COMPLETE CBC W/AUTO DIFF WBC: CPT

## 2019-11-18 PROCEDURE — 85652 RBC SED RATE AUTOMATED: CPT

## 2019-11-18 PROCEDURE — 82550 ASSAY OF CK (CPK): CPT

## 2019-11-18 PROCEDURE — 74011000250 HC RX REV CODE- 250: Performed by: INTERNAL MEDICINE

## 2019-11-18 PROCEDURE — 74011250636 HC RX REV CODE- 250/636: Performed by: INTERNAL MEDICINE

## 2019-11-18 PROCEDURE — 96374 THER/PROPH/DIAG INJ IV PUSH: CPT

## 2019-11-18 PROCEDURE — 36592 COLLECT BLOOD FROM PICC: CPT

## 2019-11-18 PROCEDURE — 36415 COLL VENOUS BLD VENIPUNCTURE: CPT

## 2019-11-18 RX ADMIN — DAPTOMYCIN 600 MG: 500 INJECTION, POWDER, LYOPHILIZED, FOR SOLUTION INTRAVENOUS at 16:55

## 2019-11-18 NOTE — PROGRESS NOTES
[facility] Short Note                       Date: 2019    Name: Sam Ortega    MRN: [id number]         : 1955      1700 Pt admit to [facility] for daptomycin ambulatory in stable condition. Assessment completed. No new concerns voiced. Please review pending lab results in [address] [place] [address]. Labs drawn and sent for processing. Mr. Mirlande Lopez vitals were reviewed prior to treatment. .   Patient Vitals for the past 12 hrs:   Temp Pulse Resp BP   19 1611 97.9 °F (36.6 °C) 77 16 127/70       Lab results were obtained and reviewed. Recent Results (from the past 12 hour(s))   CBC WITH AUTOMATED DIFF    Collection Time: 19  4:07 PM   Result Value Ref Range    WBC 7.6 4.1 - 11.1 K/uL    RBC 3.92 (L) 4.10 - 5.70 M/uL    HGB 10.9 (L) 12.1 - 17.0 g/dL    HCT 34.7 (L) 36.6 - 50.3 %    MCV 88.5 80.0 - 99.0 FL    MCH 27.8 26.0 - 34.0 PG    MCHC 31.4 30.0 - 36.5 g/dL    RDW 16.0 (H) 11.5 - 14.5 %    PLATELET 740 833 - 070 K/uL    MPV 10.2 8.9 - 12.9 FL    NRBC 0.0 0  WBC    ABSOLUTE NRBC 0.00 0.00 - 0.01 K/uL    NEUTROPHILS 58 32 - 75 %    LYMPHOCYTES 30 12 - 49 %    MONOCYTES 9 5 - 13 %    EOSINOPHILS 2 0 - 7 %    BASOPHILS 1 0 - 1 %    IMMATURE GRANULOCYTES 0 0.0 - 0.5 %    ABS. NEUTROPHILS 4.4 1.8 - 8.0 K/UL    ABS. LYMPHOCYTES 2.3 0.8 - 3.5 K/UL    ABS. MONOCYTES 0.7 0.0 - 1.0 K/UL    ABS. EOSINOPHILS 0.1 0.0 - 0.4 K/UL    ABS. BASOPHILS 0.1 0.0 - 0.1 K/UL    ABS. IMM.  GRANS. 0.0 0.00 - 0.04 K/UL    DF AUTOMATED     METABOLIC PANEL, BASIC    Collection Time: 19  4:07 PM   Result Value Ref Range    Sodium 136 136 - 145 mmol/L    Potassium 4.6 3.5 - 5.1 mmol/L    Chloride 105 97 - 108 mmol/L    CO2 23 21 - 32 mmol/L    Anion gap 8 5 - 15 mmol/L    Glucose 187 (H) 65 - 100 mg/dL    BUN 26 (H) 6 - 20 MG/DL    Creatinine 1.16 0.70 - 1.30 MG/DL    BUN/Creatinine ratio 22 (H) 12 - 20      GFR est AA >60 >60 ml/min/1.73m2    GFR est non-AA >60 >60 ml/min/1.73m2    Calcium 8.5 8.5 - 10.1 MG/DL   CK Collection Time: 11/18/19  4:07 PM   Result Value Ref Range    CK 71 39 - 308 U/L         Medications Administered     DAPTOmycin (CUBICIN) 600 mg in sterile water (preservative free) 12 mL IV syringe RF formulation     Admin Date  11/18/2019 Action  Given Dose  600 mg Route  IntraVENous Administered By  Lefty Seo RN              1775. Mr. Pierre Goncalves tolerated the infusion, and had no complaints.     Mr. Pierre Goncalves was discharged from [place] [address] in stable condition  Marylin Cárdenas RN  November 18, 2019  6:14 PM

## 2019-11-19 ENCOUNTER — HOSPITAL ENCOUNTER (OUTPATIENT)
Dept: INFUSION THERAPY | Age: 64
Discharge: HOME | End: 2019-11-19
Payer: MEDICARE

## 2019-11-19 VITALS
RESPIRATION RATE: 16 BRPM | SYSTOLIC BLOOD PRESSURE: 126 MMHG | HEART RATE: 67 BPM | TEMPERATURE: 98.6 F | DIASTOLIC BLOOD PRESSURE: 67 MMHG

## 2019-11-19 PROCEDURE — 96374 THER/PROPH/DIAG INJ IV PUSH: CPT

## 2019-11-19 PROCEDURE — 74011250636 HC RX REV CODE- 250/636: Performed by: INTERNAL MEDICINE

## 2019-11-19 PROCEDURE — 74011000250 HC RX REV CODE- 250: Performed by: INTERNAL MEDICINE

## 2019-11-19 RX ADMIN — DAPTOMYCIN 600 MG: 500 INJECTION, POWDER, LYOPHILIZED, FOR SOLUTION INTRAVENOUS at 16:54

## 2019-11-19 NOTE — PROGRESS NOTES
[facility] Short Note                       Date: 2019    Name: Geovany Liang    MRN: [id number]         : 1955      1615 Pt admit to [facility] for daptomycin ambulatory in stable condition. Assessment completed. No new concerns voiced. Mr. Jahaira Woods vitals were reviewed prior to treatment. .   Patient Vitals for the past 12 hrs:   Temp Pulse Resp BP   19 1615 98.6 °F (37 °C) 67 16 126/67     Medications Administered     DAPTOmycin (CUBICIN) 600 mg in sterile water (preservative free) 12 mL IV syringe RF formulation     Admin Date  2019 Action  Given Dose  600 mg Route  IntraVENous Administered By  Tatyana Clarke                  1700. Mr. Tavo Culver tolerated the infusion, and had no complaints.     Mr. Tavo Culver was discharged from [place] [address] in stable condition  Jaime Oconnor RN  2019  6:14 PM

## 2019-11-20 ENCOUNTER — HOSPITAL ENCOUNTER (OUTPATIENT)
Dept: INFUSION THERAPY | Age: 64
Discharge: HOME | End: 2019-11-20
Payer: MEDICARE

## 2019-11-20 VITALS
SYSTOLIC BLOOD PRESSURE: 125 MMHG | DIASTOLIC BLOOD PRESSURE: 68 MMHG | TEMPERATURE: 96.6 F | RESPIRATION RATE: 18 BRPM | HEART RATE: 72 BPM

## 2019-11-20 PROCEDURE — 74011250636 HC RX REV CODE- 250/636: Performed by: INTERNAL MEDICINE

## 2019-11-20 PROCEDURE — 74011000250 HC RX REV CODE- 250: Performed by: INTERNAL MEDICINE

## 2019-11-20 PROCEDURE — 96374 THER/PROPH/DIAG INJ IV PUSH: CPT

## 2019-11-20 RX ORDER — SODIUM CHLORIDE 9 MG/ML
10 INJECTION INTRAMUSCULAR; INTRAVENOUS; SUBCUTANEOUS AS NEEDED
Status: DISCONTINUED | OUTPATIENT
Start: 2019-11-20 | End: 2019-11-21 | Stop reason: HOSPADM

## 2019-11-20 RX ORDER — HEPARIN 100 UNIT/ML
500 SYRINGE INTRAVENOUS AS NEEDED
Status: DISCONTINUED | OUTPATIENT
Start: 2019-11-20 | End: 2019-11-21 | Stop reason: HOSPADM

## 2019-11-20 RX ADMIN — SODIUM CHLORIDE 10 ML: 9 INJECTION INTRAMUSCULAR; INTRAVENOUS; SUBCUTANEOUS at 17:13

## 2019-11-20 RX ADMIN — DAPTOMYCIN 600 MG: 500 INJECTION, POWDER, LYOPHILIZED, FOR SOLUTION INTRAVENOUS at 17:04

## 2019-11-20 NOTE — PROGRESS NOTES
Outpatient Infusion Center Progress Note    1600 Pt admit to [facility] for Daptomycin ambulatory in stable condition. Assessment completed. No new concerns voiced. Double lumen central line flushed with positive blood return. Visit Vitals  /68 (BP 1 Location: Right arm, BP Patient Position: At rest)   Pulse 72   Temp 96.6 °F (35.9 °C)   Resp 18       Medications:  Medications Administered     DAPTOmycin (CUBICIN) 600 mg in sterile water (preservative free) 12 mL IV syringe RF formulation     Admin Date  11/20/2019 Action  Given Dose  600 mg Route  IntraVENous Administered By  Rodney Edgar, RN          sodium chloride 0.9% injection 10 mL     Admin Date  11/20/2019 Action  Given Dose  10 mL Route  IntraVENous Administered By  Rodney Edgar, HINA                3801 Pt tolerated treatment well. D/c home ambulatory in no distress. Pt aware of next appointment scheduled for 11/21/19.

## 2019-11-21 ENCOUNTER — HOSPITAL ENCOUNTER (OUTPATIENT)
Dept: INFUSION THERAPY | Age: 64
Discharge: HOME | End: 2019-11-21
Payer: MEDICARE

## 2019-11-21 VITALS
DIASTOLIC BLOOD PRESSURE: 72 MMHG | TEMPERATURE: 97.1 F | HEART RATE: 77 BPM | RESPIRATION RATE: 18 BRPM | SYSTOLIC BLOOD PRESSURE: 130 MMHG

## 2019-11-21 LAB
ALBUMIN SERPL-MCNC: 2.9 G/DL (ref 3.5–5)
ALBUMIN/GLOB SERPL: 0.6 {RATIO} (ref 1.1–2.2)
ALP SERPL-CCNC: 66 U/L (ref 45–117)
ALT SERPL-CCNC: 12 U/L (ref 12–78)
ANION GAP SERPL CALC-SCNC: 4 MMOL/L (ref 5–15)
ANION GAP SERPL CALC-SCNC: 8 MMOL/L (ref 5–15)
AST SERPL-CCNC: 11 U/L (ref 15–37)
BASOPHILS # BLD: 0 K/UL (ref 0–0.1)
BASOPHILS NFR BLD: 1 % (ref 0–1)
BILIRUB SERPL-MCNC: 0.8 MG/DL (ref 0.2–1)
BUN SERPL-MCNC: 19 MG/DL (ref 6–20)
BUN SERPL-MCNC: 20 MG/DL (ref 6–20)
BUN/CREAT SERPL: 21 (ref 12–20)
BUN/CREAT SERPL: 21 (ref 12–20)
CALCIUM SERPL-MCNC: 8.4 MG/DL (ref 8.5–10.1)
CALCIUM SERPL-MCNC: 8.6 MG/DL (ref 8.5–10.1)
CHLORIDE SERPL-SCNC: 104 MMOL/L (ref 97–108)
CHLORIDE SERPL-SCNC: 105 MMOL/L (ref 97–108)
CO2 SERPL-SCNC: 22 MMOL/L (ref 21–32)
CO2 SERPL-SCNC: 24 MMOL/L (ref 21–32)
CREAT SERPL-MCNC: 0.9 MG/DL (ref 0.7–1.3)
CREAT SERPL-MCNC: 0.94 MG/DL (ref 0.7–1.3)
DIFFERENTIAL METHOD BLD: ABNORMAL
EOSINOPHIL # BLD: 0.2 K/UL (ref 0–0.4)
EOSINOPHIL NFR BLD: 2 % (ref 0–7)
ERYTHROCYTE [DISTWIDTH] IN BLOOD BY AUTOMATED COUNT: 15.9 % (ref 11.5–14.5)
GLOBULIN SER CALC-MCNC: 4.5 G/DL (ref 2–4)
GLUCOSE SERPL-MCNC: 116 MG/DL (ref 65–100)
GLUCOSE SERPL-MCNC: 117 MG/DL (ref 65–100)
HCT VFR BLD AUTO: 34.4 % (ref 36.6–50.3)
HGB BLD-MCNC: 10.8 G/DL (ref 12.1–17)
IMM GRANULOCYTES # BLD AUTO: 0 K/UL (ref 0–0.04)
IMM GRANULOCYTES NFR BLD AUTO: 0 % (ref 0–0.5)
INR PPP: 1.9 (ref 0.9–1.1)
LYMPHOCYTES # BLD: 2.3 K/UL (ref 0.8–3.5)
LYMPHOCYTES NFR BLD: 36 % (ref 12–49)
MCH RBC QN AUTO: 27.6 PG (ref 26–34)
MCHC RBC AUTO-ENTMCNC: 31.4 G/DL (ref 30–36.5)
MCV RBC AUTO: 87.8 FL (ref 80–99)
MONOCYTES # BLD: 0.6 K/UL (ref 0–1)
MONOCYTES NFR BLD: 9 % (ref 5–13)
NEUTS SEG # BLD: 3.4 K/UL (ref 1.8–8)
NEUTS SEG NFR BLD: 52 % (ref 32–75)
NRBC # BLD: 0 K/UL (ref 0–0.01)
NRBC BLD-RTO: 0 PER 100 WBC
PLATELET # BLD AUTO: 159 K/UL (ref 150–400)
PMV BLD AUTO: 10 FL (ref 8.9–12.9)
POTASSIUM SERPL-SCNC: 4.4 MMOL/L (ref 3.5–5.1)
POTASSIUM SERPL-SCNC: 4.5 MMOL/L (ref 3.5–5.1)
PROT SERPL-MCNC: 7.4 G/DL (ref 6.4–8.2)
PROTHROMBIN TIME: 18.5 SEC (ref 9–11.1)
RBC # BLD AUTO: 3.92 M/UL (ref 4.1–5.7)
SODIUM SERPL-SCNC: 133 MMOL/L (ref 136–145)
SODIUM SERPL-SCNC: 134 MMOL/L (ref 136–145)
WBC # BLD AUTO: 6.4 K/UL (ref 4.1–11.1)

## 2019-11-21 PROCEDURE — 80053 COMPREHEN METABOLIC PANEL: CPT

## 2019-11-21 PROCEDURE — 96374 THER/PROPH/DIAG INJ IV PUSH: CPT

## 2019-11-21 PROCEDURE — 85610 PROTHROMBIN TIME: CPT

## 2019-11-21 PROCEDURE — 74011250636 HC RX REV CODE- 250/636: Performed by: INTERNAL MEDICINE

## 2019-11-21 PROCEDURE — 36415 COLL VENOUS BLD VENIPUNCTURE: CPT

## 2019-11-21 PROCEDURE — 74011000250 HC RX REV CODE- 250: Performed by: INTERNAL MEDICINE

## 2019-11-21 PROCEDURE — 85025 COMPLETE CBC W/AUTO DIFF WBC: CPT

## 2019-11-21 RX ADMIN — DAPTOMYCIN 600 MG: 500 INJECTION, POWDER, LYOPHILIZED, FOR SOLUTION INTRAVENOUS at 16:31

## 2019-11-21 NOTE — PROGRESS NOTES
[facility] Short Note                       Date: 2019    Name: Sam Ortega    MRN: [id number]         : 1955    1600 Pt admit to [facility] for daily dapto ambulatory in stable condition. Assessment completed. No new concerns voiced. Please review pending lab results in [address] [place] [address]. Mr. Mirlande Lopez vitals were reviewed prior to treatment. Patient Vitals for the past 12 hrs:   Temp Pulse Resp BP   19 1600 97.1 °F (36.2 °C) 77 18 130/72       PICC with positive blood return. Lab drawn, flushed, heparinized and capped per protocol. Medications given:   Medications Administered     DAPTOmycin (CUBICIN) 600 mg in sterile water (preservative free) 12 mL IV syringe RF formulation     Admin Date  2019 Action  Given Dose  600 mg Route  IntraVENous Administered By  Cassandra Doty, RN                   6289 Pt tolerated treatment well. D/c home ambulatory in no distress.      Future Appointments   Date Time Provider Laine Roper   2019  4:00 PM [facility]   2019 10:00 AM [facility]   2019 10:00 AM [facility]   2019  4:00 PM [facility]   2019  4:00 PM [facility] FT [facility]   2019  4:00 PM [facility] FT [facility]AIR [facility]   2019 10:00 AM [facility]   2019 10:30 AM BREMO FT [facility]AIR [facility]   2019 10:30 AM [facility]   2019 10:30 AM [facility]   2019  4:00 PM [facility]MO FT CHAIR [facility]   12/3/2019  4:00 PM [facility]   2019  4:00 PM [facility]MO FT CHAIR [facility]   2019  4:00 PM BREMO FT CHAIR 1 [facility]   2019  4:00 PM BREMO FT CHAIR 1 [facility]   2019 10:00 AM BREMO FT CHAIR 1 [facility]   2019 10:00 AM KRYSTALMO FT CHAIR 1 [facility]   12/9/2019  4:00 PM BREMO FT CHAIR 1 [facility]   12/10/2019  4:00 PM BREMO FT CHAIR 1 [facility]   12/11/2019  4:00 PM BREMO FT CHAIR 1 [facility]   12/12/2019  4:00 PM BREMO FT CHAIR 1 [facility]   12/13/2019  4:00 PM BREMO FT CHAIR 1 [facility]   12/14/2019 10:00 AM BREMO FT CHAIR 1 2701 Onesimo Tony RN  November 21, 2019  6:15 PM

## 2019-11-22 ENCOUNTER — HOSPITAL ENCOUNTER (OUTPATIENT)
Dept: INFUSION THERAPY | Age: 64
Discharge: HOME | End: 2019-11-22
Payer: MEDICARE

## 2019-11-22 VITALS
RESPIRATION RATE: 18 BRPM | SYSTOLIC BLOOD PRESSURE: 108 MMHG | TEMPERATURE: 97.8 F | HEART RATE: 87 BPM | DIASTOLIC BLOOD PRESSURE: 61 MMHG

## 2019-11-22 PROCEDURE — 77030020847 HC STATLOK BARD -A

## 2019-11-22 PROCEDURE — 96374 THER/PROPH/DIAG INJ IV PUSH: CPT

## 2019-11-22 PROCEDURE — 74011000250 HC RX REV CODE- 250: Performed by: INTERNAL MEDICINE

## 2019-11-22 PROCEDURE — 74011250636 HC RX REV CODE- 250/636: Performed by: INTERNAL MEDICINE

## 2019-11-22 RX ADMIN — DAPTOMYCIN 600 MG: 500 INJECTION, POWDER, LYOPHILIZED, FOR SOLUTION INTRAVENOUS at 16:55

## 2019-11-22 NOTE — PROGRESS NOTES
[facility] Short Note                       Date: 2019    Name: Giulia Horton    MRN: [id number]         : 1955    1600 Pt admit to [facility] for daily dapto ambulatory in stable condition. Assessment completed. No new concerns voiced. Mr. Gricelda Soriano vitals were reviewed prior to treatment. Patient Vitals for the past 12 hrs:   Temp Pulse Resp BP   19 1609 97.8 °F (36.6 °C) 87 18 108/61       PICC with positive blood return. Dressing changed. Lines  flushed, heparinized and capped per protocol at conclusion. Medications given:           Medications Administered     DAPTOmycin (CUBICIN) 600 mg in sterile water (preservative free) 12 mL IV syringe RF formulation     Admin Date  2019 Action  Given Dose  600 mg Route  IntraVENous Administered By  Jeannie Acosta RN                     1700 Pt tolerated treatment well. D/c home ambulatory in no distress.      Future Appointments   Date Time Provider Laine Roper   2019 10:00 AM [facility]   2019 10:00 AM [facility]   2019  4:00 PM [facility]   2019  4:00 PM [facility]   2019  4:00 PM [facility]   2019 10:00 AM [facility]   2019 10:30 AM [facility]   2019 10:30 AM [facility]   2019 10:30 AM [facility]   2019  4:00 PM [facility]   12/3/2019  4:00 PM [facility]   2019  4:00 PM [facility]   2019  4:00 PM [facility]   2019  4:00 PM BREMO FT CHAIR 1 [facility]   2019 10:00 AM BREMO FT CHAIR 1 [facility]   2019 10:00 AM BREMO FT CHAIR 1 [facility]   2019  4:00 PM BREMO FT CHAIR 1 [address]   12/10/2019  4:00 PM [facility]   12/11/2019  4:00 PM BREMO FT CHAIR [facility]   12/12/2019  4:00 PM BREMO FT CHAIR 1 [facility]   12/13/2019  4:00 PM [facility] FT [facility]IR [facility]   12/14/2019 10:00 AM BREMO FT CHAIR 1 [address], RN  November 22, 2019  6:15 PM

## 2019-11-23 ENCOUNTER — HOSPITAL ENCOUNTER (OUTPATIENT)
Dept: INFUSION THERAPY | Age: 64
Discharge: HOME | End: 2019-11-23
Payer: MEDICARE

## 2019-11-23 VITALS
DIASTOLIC BLOOD PRESSURE: 64 MMHG | HEART RATE: 67 BPM | SYSTOLIC BLOOD PRESSURE: 115 MMHG | RESPIRATION RATE: 16 BRPM | TEMPERATURE: 97.2 F

## 2019-11-23 PROCEDURE — 74011000250 HC RX REV CODE- 250: Performed by: INTERNAL MEDICINE

## 2019-11-23 PROCEDURE — 96372 THER/PROPH/DIAG INJ SC/IM: CPT

## 2019-11-23 PROCEDURE — 74011250636 HC RX REV CODE- 250/636: Performed by: INTERNAL MEDICINE

## 2019-11-23 RX ADMIN — DAPTOMYCIN 600 MG: 500 INJECTION, POWDER, LYOPHILIZED, FOR SOLUTION INTRAVENOUS at 08:49

## 2019-11-23 NOTE — PROGRESS NOTES
Outpatient Infusion Center Short Visit Progress Note    0845 Pt admit to [facility] for daily daptomycin ambulatory in stable condition. Assessment completed. Patient reports that he went to [facility] ER yesterday for increased heart rate. He was discharged and cleared by ER MD to continue antibiotics as long as he follows up with PCP this week. Patient denies any pain, chest discomfort, shortness of breath or difficulty breathing today. PICC line with positive blood return in both ports. Patient Vitals for the past 12 hrs:   Temp Pulse Resp BP   11/23/19 0850 97.2 °F (36.2 °C) 67 16 115/64         Medications Administered     DAPTOmycin (CUBICIN) 600 mg in sterile water (preservative free) 12 mL IV syringe RF formulation     Admin Date  11/23/2019 Action  Given Dose  600 mg Route  IntraVENous Administered By  [address] [place] [address] Pt tolerated treatment well. D/c home ambulatory in no distress. Pt aware of next appointment scheduled for 11/24/2019.     Georgianne Cabot, RN

## 2019-11-24 ENCOUNTER — HOSPITAL ENCOUNTER (OUTPATIENT)
Dept: INFUSION THERAPY | Age: 64
Discharge: HOME | End: 2019-11-24
Payer: MEDICARE

## 2019-11-24 VITALS
HEART RATE: 69 BPM | TEMPERATURE: 97.3 F | DIASTOLIC BLOOD PRESSURE: 66 MMHG | SYSTOLIC BLOOD PRESSURE: 120 MMHG | RESPIRATION RATE: 16 BRPM

## 2019-11-24 PROCEDURE — 96374 THER/PROPH/DIAG INJ IV PUSH: CPT

## 2019-11-24 PROCEDURE — 74011250636 HC RX REV CODE- 250/636: Performed by: INTERNAL MEDICINE

## 2019-11-24 PROCEDURE — 74011000250 HC RX REV CODE- 250: Performed by: INTERNAL MEDICINE

## 2019-11-24 RX ORDER — SODIUM CHLORIDE 9 MG/ML
10 INJECTION INTRAMUSCULAR; INTRAVENOUS; SUBCUTANEOUS AS NEEDED
Status: DISCONTINUED | OUTPATIENT
Start: 2019-11-24 | End: 2019-11-25 | Stop reason: HOSPADM

## 2019-11-24 RX ORDER — HEPARIN 100 UNIT/ML
500 SYRINGE INTRAVENOUS AS NEEDED
Status: DISCONTINUED | OUTPATIENT
Start: 2019-11-24 | End: 2019-11-25 | Stop reason: HOSPADM

## 2019-11-24 RX ADMIN — Medication 500 UNITS: at 11:32

## 2019-11-24 RX ADMIN — DAPTOMYCIN 600 MG: 500 INJECTION, POWDER, LYOPHILIZED, FOR SOLUTION INTRAVENOUS at 11:28

## 2019-11-24 RX ADMIN — SODIUM CHLORIDE 10 ML: 9 INJECTION INTRAMUSCULAR; INTRAVENOUS; SUBCUTANEOUS at 11:32

## 2019-11-24 NOTE — PROGRESS NOTES
Outpatient Infusion Center Short Visit Progress Note    4091 Patient admitted to [place] for Daptomycin ambulatory in stable condition. Assessment completed. No new concerns voiced. Vital Signs:  Visit Vitals  /66 (BP 1 Location: Right arm, BP Patient Position: Sitting)   Pulse 69   Temp 97.3 °F (36.3 °C)   Resp 16       Patient has double lumen abhinav's catheter both ports have + blood return. Flushed and heparinized per protocol. Medications:  Medications Administered     DAPTOmycin (CUBICIN) 600 mg in sterile water (preservative free) 12 mL IV syringe RF formulation     Admin Date  11/24/2019 Action  Given Dose  600 mg Route  IntraVENous Administered By  Brianna Portal A          heparin (porcine) pf 500 Units     Admin Date  11/24/2019 Action  Given Dose  500 Units Route  IntraVENous Administered By  Brianna Portal A          sodium chloride 0.9% injection 10 mL     Admin Date  11/24/2019 Action  Given Dose  10 mL Route  IntraVENous Administered By  Brianna Portal A                Patient tolerated treatment well. Patient discharged from [place] [address] ambulatory in no distress at 1130. Patient aware of next appointment.     Future Appointments   Date Time Provider Laine Roper   11/25/2019  4:00 PM [facility]   11/26/2019  4:00 PM [facility]   11/27/2019  4:00 PM [facility]   11/28/2019 10:00 AM [facility]   11/29/2019 10:30 AM [facility]   11/30/2019 10:30 AM [facility]   12/1/2019 10:30 AM [facility]   12/2/2019  4:00 PM [facility]   12/3/2019  4:00 PM [facility]   12/4/2019  4:00 PM [facility]   12/5/2019  4:00 PM [facility]   12/6/2019  4:00 PM [facility]MO FT CHAIR 1 [facility]   12/7/2019 10:00 AM BREMO FT CHAIR 1 [facility]    12/8/2019 10:00 AM BREMO FT CHAIR 1 [facility]   12/9/2019  4:00 PM BREMO FT CHAIR 1 [facility]   12/10/2019  4:00 PM BREMO FT CHAIR 1 [facility]   12/11/2019  4:00 PM BREMO FT CHAIR 1 [facility]    12/12/2019  4:00 PM BREMO FT CHAIR 1 [facility]    12/13/2019  4:00 PM BREMO FT CHAIR 1 [facility]   12/14/2019 10:00 AM BREMO FT CHAIR 1 [facility]

## 2019-11-25 ENCOUNTER — HOSPITAL ENCOUNTER (OUTPATIENT)
Dept: INFUSION THERAPY | Age: 64
Discharge: HOME | End: 2019-11-25
Payer: MEDICARE

## 2019-11-25 VITALS
SYSTOLIC BLOOD PRESSURE: 116 MMHG | DIASTOLIC BLOOD PRESSURE: 62 MMHG | RESPIRATION RATE: 18 BRPM | HEART RATE: 80 BPM | TEMPERATURE: 96.5 F

## 2019-11-25 LAB
ANION GAP SERPL CALC-SCNC: 4 MMOL/L (ref 5–15)
BASOPHILS # BLD: 0.1 K/UL (ref 0–0.1)
BASOPHILS NFR BLD: 1 % (ref 0–1)
BUN SERPL-MCNC: 26 MG/DL (ref 6–20)
BUN/CREAT SERPL: 19 (ref 12–20)
CALCIUM SERPL-MCNC: 8.9 MG/DL (ref 8.5–10.1)
CHLORIDE SERPL-SCNC: 107 MMOL/L (ref 97–108)
CK SERPL-CCNC: 68 U/L (ref 39–308)
CO2 SERPL-SCNC: 23 MMOL/L (ref 21–32)
CREAT SERPL-MCNC: 1.36 MG/DL (ref 0.7–1.3)
DIFFERENTIAL METHOD BLD: ABNORMAL
EOSINOPHIL # BLD: 0.1 K/UL (ref 0–0.4)
EOSINOPHIL NFR BLD: 1 % (ref 0–7)
ERYTHROCYTE [DISTWIDTH] IN BLOOD BY AUTOMATED COUNT: 15.9 % (ref 11.5–14.5)
ERYTHROCYTE [SEDIMENTATION RATE] IN BLOOD: 61 MM/HR (ref 0–20)
GLUCOSE SERPL-MCNC: 156 MG/DL (ref 65–100)
HCT VFR BLD AUTO: 35.2 % (ref 36.6–50.3)
HGB BLD-MCNC: 10.8 G/DL (ref 12.1–17)
IMM GRANULOCYTES # BLD AUTO: 0 K/UL (ref 0–0.04)
IMM GRANULOCYTES NFR BLD AUTO: 0 % (ref 0–0.5)
LYMPHOCYTES # BLD: 2.4 K/UL (ref 0.8–3.5)
LYMPHOCYTES NFR BLD: 33 % (ref 12–49)
MCH RBC QN AUTO: 27.1 PG (ref 26–34)
MCHC RBC AUTO-ENTMCNC: 30.7 G/DL (ref 30–36.5)
MCV RBC AUTO: 88.2 FL (ref 80–99)
MONOCYTES # BLD: 0.6 K/UL (ref 0–1)
MONOCYTES NFR BLD: 8 % (ref 5–13)
NEUTS SEG # BLD: 4.1 K/UL (ref 1.8–8)
NEUTS SEG NFR BLD: 57 % (ref 32–75)
NRBC # BLD: 0 K/UL (ref 0–0.01)
NRBC BLD-RTO: 0 PER 100 WBC
PLATELET # BLD AUTO: 133 K/UL (ref 150–400)
PMV BLD AUTO: 9.6 FL (ref 8.9–12.9)
POTASSIUM SERPL-SCNC: 4.2 MMOL/L (ref 3.5–5.1)
RBC # BLD AUTO: 3.99 M/UL (ref 4.1–5.7)
SODIUM SERPL-SCNC: 134 MMOL/L (ref 136–145)
WBC # BLD AUTO: 7.2 K/UL (ref 4.1–11.1)

## 2019-11-25 PROCEDURE — 96374 THER/PROPH/DIAG INJ IV PUSH: CPT

## 2019-11-25 PROCEDURE — 74011000250 HC RX REV CODE- 250: Performed by: INTERNAL MEDICINE

## 2019-11-25 PROCEDURE — 82550 ASSAY OF CK (CPK): CPT

## 2019-11-25 PROCEDURE — 85652 RBC SED RATE AUTOMATED: CPT

## 2019-11-25 PROCEDURE — 36415 COLL VENOUS BLD VENIPUNCTURE: CPT

## 2019-11-25 PROCEDURE — 80048 BASIC METABOLIC PNL TOTAL CA: CPT

## 2019-11-25 PROCEDURE — 85025 COMPLETE CBC W/AUTO DIFF WBC: CPT

## 2019-11-25 PROCEDURE — 74011250636 HC RX REV CODE- 250/636: Performed by: INTERNAL MEDICINE

## 2019-11-25 RX ORDER — HEPARIN 100 UNIT/ML
500 SYRINGE INTRAVENOUS AS NEEDED
Status: DISCONTINUED | OUTPATIENT
Start: 2019-11-25 | End: 2019-11-26 | Stop reason: HOSPADM

## 2019-11-25 RX ORDER — SODIUM CHLORIDE 0.9 % (FLUSH) 0.9 %
5-10 SYRINGE (ML) INJECTION AS NEEDED
Status: DISCONTINUED | OUTPATIENT
Start: 2019-11-25 | End: 2019-11-26 | Stop reason: HOSPADM

## 2019-11-25 RX ADMIN — Medication 500 UNITS: at 16:29

## 2019-11-25 RX ADMIN — Medication 10 ML: at 16:05

## 2019-11-25 RX ADMIN — Medication 10 ML: at 16:24

## 2019-11-25 RX ADMIN — Medication 10 ML: at 16:07

## 2019-11-25 RX ADMIN — DAPTOMYCIN 600 MG: 500 INJECTION, POWDER, LYOPHILIZED, FOR SOLUTION INTRAVENOUS at 16:25

## 2019-11-25 RX ADMIN — Medication 500 UNITS: at 16:08

## 2019-11-25 RX ADMIN — Medication 10 ML: at 16:28

## 2019-11-25 NOTE — PROGRESS NOTES
Outpatient Infusion Center Short Visit Progress Note    1600 Pt admit to [facility] for Daptomycin ambulatory in stable condition. Assessment completed. No new concerns voiced, double lumen midline flushes well, good blood return, labs drawn and sent for processing. Patient Vitals for the past 12 hrs:   Temp Pulse Resp BP   11/25/19 1600 96.5 °F (35.8 °C) 80 18 116/62        Medications:  Medications Administered     DAPTOmycin (CUBICIN) 600 mg in sterile water (preservative free) 12 mL IV syringe RF formulation     Admin Date  11/25/2019 Action  Given Dose  600 mg Route  IntraVENous Administered By  Octmamiis A          heparin (porcine) pf 500 Units     Admin Date  11/25/2019 Action  Given Dose  500 Units Route  IntraVENous Administered By  Cathyann Revels Date  11/25/2019 Action  Given Dose  500 Units Route  IntraVENous Administered By  Elfreda Browserlingis A          sodium chloride (NS) flush 5-10 mL     Admin Date  11/25/2019 Action  Given Dose  10 mL Route  IntraVENous Administered By  Cathyann Revels Date  11/25/2019 Action  Given Dose  10 mL Route  IntraVENous Administered By  [facility] Date  11/25/2019 Action  Given Dose  10 mL Route  IntraVENous Administered By  [facility] Date  11/25/2019 Action  Given Dose  10 mL Route  IntraVENous Administered By  ElfrDev4Xis A                  1630 Pt tolerated treatment well, lumens flushed, heparinized and capped,  D/c home ambulatory in no distress. Pt aware of next appointment scheduled for 11/26/19.

## 2019-11-26 ENCOUNTER — HOSPITAL ENCOUNTER (OUTPATIENT)
Dept: INFUSION THERAPY | Age: 64
Discharge: HOME | End: 2019-11-26
Payer: MEDICARE

## 2019-11-26 VITALS
HEART RATE: 70 BPM | DIASTOLIC BLOOD PRESSURE: 69 MMHG | SYSTOLIC BLOOD PRESSURE: 121 MMHG | RESPIRATION RATE: 18 BRPM | TEMPERATURE: 96.8 F

## 2019-11-26 PROCEDURE — 74011250636 HC RX REV CODE- 250/636: Performed by: INTERNAL MEDICINE

## 2019-11-26 PROCEDURE — 74011000250 HC RX REV CODE- 250: Performed by: INTERNAL MEDICINE

## 2019-11-26 PROCEDURE — 96374 THER/PROPH/DIAG INJ IV PUSH: CPT

## 2019-11-26 RX ADMIN — DAPTOMYCIN 600 MG: 500 INJECTION, POWDER, LYOPHILIZED, FOR SOLUTION INTRAVENOUS at 16:09

## 2019-11-26 NOTE — PROGRESS NOTES
[phone] Pt admit to [facility] for Daptomycin ambulatory in stable condition. Assessment completed. No new concerns voiced. PICC with positive blood return. Visit Vitals  /69   Pulse 70   Temp 96.8 °F (36 °C)   Resp 18       Medications:  Medications Administered     DAPTOmycin (CUBICIN) 600 mg in sterile water (preservative free) 12 mL IV syringe RF formulation     Admin Date  11/26/2019 Action  Given Dose  600 mg Route  IntraVENous Administered By  Merlin Mantis, RN                  1334 Pt tolerated treatment well. PICC maintained positive blood return throughout treatment, flushed with positive blood return at conclusion and PICC heparinized and curos caps placed on end claves. D/c home ambulatory in no distress. Pt aware of next [facility] appointment scheduled for 11/27/19.

## 2019-11-27 ENCOUNTER — HOSPITAL ENCOUNTER (OUTPATIENT)
Dept: INFUSION THERAPY | Age: 64
Discharge: HOME | End: 2019-11-27
Payer: MEDICARE

## 2019-11-27 VITALS
TEMPERATURE: 97.9 F | DIASTOLIC BLOOD PRESSURE: 65 MMHG | SYSTOLIC BLOOD PRESSURE: 124 MMHG | RESPIRATION RATE: 18 BRPM | HEART RATE: 66 BPM

## 2019-11-27 PROCEDURE — 96374 THER/PROPH/DIAG INJ IV PUSH: CPT

## 2019-11-27 PROCEDURE — 74011000250 HC RX REV CODE- 250: Performed by: INTERNAL MEDICINE

## 2019-11-27 PROCEDURE — 74011250636 HC RX REV CODE- 250/636: Performed by: INTERNAL MEDICINE

## 2019-11-27 RX ADMIN — DAPTOMYCIN 600 MG: 500 INJECTION, POWDER, LYOPHILIZED, FOR SOLUTION INTRAVENOUS at 16:23

## 2019-11-27 NOTE — PROGRESS NOTES
[facility] Short Visit Note:    5376:  Pt arrived ambulatory and in no distress for DAILY DAPTOMYCIN  and tolerated well. Nunez Bares catheter with positive blood return in both lumens, flushed with saline and heparin at end of treatment  D/Cd from [facility] ambulatory and in no distress. Next visit 11-28-19.     Visit Vitals  /65   Pulse 66   Temp 97.9 °F (36.6 °C)   Resp 18     Medications Administered     DAPTOmycin (CUBICIN) 600 mg in sterile water (preservative free) 12 mL IV syringe RF formulation     Admin Date  11/27/2019 Action  Given Dose  600 mg Route  IntraVENous Administered By  Swati Rousseau RN

## 2019-11-28 ENCOUNTER — HOSPITAL ENCOUNTER (OUTPATIENT)
Dept: INFUSION THERAPY | Age: 64
Discharge: HOME | End: 2019-11-28
Payer: MEDICARE

## 2019-11-28 VITALS
DIASTOLIC BLOOD PRESSURE: 65 MMHG | RESPIRATION RATE: 16 BRPM | SYSTOLIC BLOOD PRESSURE: 104 MMHG | TEMPERATURE: 96.7 F | HEART RATE: 67 BPM

## 2019-11-28 PROCEDURE — 96374 THER/PROPH/DIAG INJ IV PUSH: CPT

## 2019-11-28 RX ORDER — SODIUM CHLORIDE 0.9 % (FLUSH) 0.9 %
5-10 SYRINGE (ML) INJECTION AS NEEDED
Status: DISCONTINUED | OUTPATIENT
Start: 2019-11-28 | End: 2019-11-29 | Stop reason: HOSPADM

## 2019-11-28 RX ORDER — HEPARIN 100 UNIT/ML
500 SYRINGE INTRAVENOUS AS NEEDED
Status: DISCONTINUED | OUTPATIENT
Start: 2019-11-28 | End: 2019-11-29 | Stop reason: HOSPADM

## 2019-11-28 NOTE — PROGRESS NOTES
[facility] Outpatient Infusion Center Note:    8726ZE arrived at [facility] ambulatory and in no distress for dias antibiotic. Assessment stable, no new complaints voiced. Medications received:  Daptomycin  Tolerated treatment well, no adverse reaction noted. D/Cd from [facility] ambulatory and in no distress accompanied by *self  Next appt 11/29  Visit Vitals  /65   Pulse 67   Temp 96.7 °F (35.9 °C)   Resp 16     No results found for this or any previous visit (from the past 12 hour(s)).

## 2019-11-29 ENCOUNTER — HOSPITAL ENCOUNTER (OUTPATIENT)
Dept: INFUSION THERAPY | Age: 64
Discharge: HOME | End: 2019-11-29
Payer: MEDICARE

## 2019-11-29 VITALS
TEMPERATURE: 96.6 F | SYSTOLIC BLOOD PRESSURE: 101 MMHG | HEART RATE: 130 BPM | RESPIRATION RATE: 18 BRPM | DIASTOLIC BLOOD PRESSURE: 67 MMHG

## 2019-11-29 PROCEDURE — 96374 THER/PROPH/DIAG INJ IV PUSH: CPT

## 2019-11-29 PROCEDURE — 74011000250 HC RX REV CODE- 250: Performed by: INTERNAL MEDICINE

## 2019-11-29 PROCEDURE — 74011250636 HC RX REV CODE- 250/636: Performed by: INTERNAL MEDICINE

## 2019-11-29 RX ORDER — HEPARIN 100 UNIT/ML
500 SYRINGE INTRAVENOUS AS NEEDED
Status: DISCONTINUED | OUTPATIENT
Start: 2019-11-29 | End: 2019-11-30 | Stop reason: HOSPADM

## 2019-11-29 RX ORDER — SODIUM CHLORIDE 0.9 % (FLUSH) 0.9 %
5-10 SYRINGE (ML) INJECTION AS NEEDED
Status: DISCONTINUED | OUTPATIENT
Start: 2019-11-29 | End: 2019-11-30 | Stop reason: HOSPADM

## 2019-11-29 RX ADMIN — Medication 500 UNITS: at 09:50

## 2019-11-29 RX ADMIN — Medication 10 ML: at 09:49

## 2019-11-29 RX ADMIN — Medication 10 ML: at 09:50

## 2019-11-29 RX ADMIN — Medication 500 UNITS: at 09:51

## 2019-11-29 RX ADMIN — DAPTOMYCIN 600 MG: 500 INJECTION, POWDER, LYOPHILIZED, FOR SOLUTION INTRAVENOUS at 09:45

## 2019-11-29 RX ADMIN — Medication 10 ML: at 09:48

## 2019-11-29 RX ADMIN — Medication 10 ML: at 09:44

## 2019-11-29 NOTE — PROGRESS NOTES
Outpatient Infusion Center Short Visit Progress Note    2331 Pt admit to [facility] for daily Daptomycin ambulatory in stable condition. Assessment completed. No new concerns voiced. Double lumen line flushed w/ positive blood return. Dressing changed per protocol. Notified Dr. Shanique Parra of pt's HR of 130. Pt states he has been having episodes of Afib and has been in contact with his cardiologist. [facility] Pt contacted cardiologist while in [facility] and received changes to his home medications. Medications:  Daptomycin 600 mg IVP    Blood pressure 101/67, pulse (!) 130, temperature 96.6 °F (35.9 °C), resp. rate 18.    0955 Pt tolerated treatment well. D/c home ambulatory in no distress. Pt aware of next appointment scheduled for 11/30/2019.

## 2019-11-30 ENCOUNTER — HOSPITAL ENCOUNTER (OUTPATIENT)
Dept: INFUSION THERAPY | Age: 64
Discharge: HOME | End: 2019-11-30
Payer: MEDICARE

## 2019-11-30 VITALS
DIASTOLIC BLOOD PRESSURE: 62 MMHG | SYSTOLIC BLOOD PRESSURE: 116 MMHG | TEMPERATURE: 98.1 F | RESPIRATION RATE: 18 BRPM | HEART RATE: 74 BPM

## 2019-11-30 LAB
ANION GAP SERPL CALC-SCNC: 3 MMOL/L (ref 5–15)
BASOPHILS # BLD: 0.1 K/UL (ref 0–0.1)
BASOPHILS NFR BLD: 1 % (ref 0–1)
BUN SERPL-MCNC: 27 MG/DL (ref 6–20)
BUN/CREAT SERPL: 25 (ref 12–20)
CALCIUM SERPL-MCNC: 8.4 MG/DL (ref 8.5–10.1)
CHLORIDE SERPL-SCNC: 109 MMOL/L (ref 97–108)
CK SERPL-CCNC: 59 U/L (ref 39–308)
CO2 SERPL-SCNC: 25 MMOL/L (ref 21–32)
CREAT SERPL-MCNC: 1.1 MG/DL (ref 0.7–1.3)
DIFFERENTIAL METHOD BLD: ABNORMAL
EOSINOPHIL # BLD: 0.2 K/UL (ref 0–0.4)
EOSINOPHIL NFR BLD: 3 % (ref 0–7)
ERYTHROCYTE [DISTWIDTH] IN BLOOD BY AUTOMATED COUNT: 15.9 % (ref 11.5–14.5)
GLUCOSE SERPL-MCNC: 175 MG/DL (ref 65–100)
HCT VFR BLD AUTO: 35 % (ref 36.6–50.3)
HGB BLD-MCNC: 10.8 G/DL (ref 12.1–17)
IMM GRANULOCYTES # BLD AUTO: 0 K/UL (ref 0–0.04)
IMM GRANULOCYTES NFR BLD AUTO: 0 % (ref 0–0.5)
INR PPP: 2.5 (ref 0.9–1.1)
LYMPHOCYTES # BLD: 2.7 K/UL (ref 0.8–3.5)
LYMPHOCYTES NFR BLD: 37 % (ref 12–49)
MCH RBC QN AUTO: 27.3 PG (ref 26–34)
MCHC RBC AUTO-ENTMCNC: 30.9 G/DL (ref 30–36.5)
MCV RBC AUTO: 88.4 FL (ref 80–99)
MONOCYTES # BLD: 0.6 K/UL (ref 0–1)
MONOCYTES NFR BLD: 8 % (ref 5–13)
NEUTS SEG # BLD: 3.7 K/UL (ref 1.8–8)
NEUTS SEG NFR BLD: 51 % (ref 32–75)
NRBC # BLD: 0 K/UL (ref 0–0.01)
NRBC BLD-RTO: 0 PER 100 WBC
PLATELET # BLD AUTO: 145 K/UL (ref 150–400)
PMV BLD AUTO: 10.1 FL (ref 8.9–12.9)
POTASSIUM SERPL-SCNC: 4.3 MMOL/L (ref 3.5–5.1)
PROTHROMBIN TIME: 24.5 SEC (ref 9–11.1)
RBC # BLD AUTO: 3.96 M/UL (ref 4.1–5.7)
SODIUM SERPL-SCNC: 137 MMOL/L (ref 136–145)
WBC # BLD AUTO: 7.2 K/UL (ref 4.1–11.1)

## 2019-11-30 PROCEDURE — 96374 THER/PROPH/DIAG INJ IV PUSH: CPT

## 2019-11-30 PROCEDURE — 85025 COMPLETE CBC W/AUTO DIFF WBC: CPT

## 2019-11-30 PROCEDURE — 74011000250 HC RX REV CODE- 250: Performed by: INTERNAL MEDICINE

## 2019-11-30 PROCEDURE — 85610 PROTHROMBIN TIME: CPT

## 2019-11-30 PROCEDURE — 80048 BASIC METABOLIC PNL TOTAL CA: CPT

## 2019-11-30 PROCEDURE — 82550 ASSAY OF CK (CPK): CPT

## 2019-11-30 PROCEDURE — 74011250636 HC RX REV CODE- 250/636: Performed by: INTERNAL MEDICINE

## 2019-11-30 PROCEDURE — 36415 COLL VENOUS BLD VENIPUNCTURE: CPT

## 2019-11-30 RX ADMIN — DAPTOMYCIN 600 MG: 500 INJECTION, POWDER, LYOPHILIZED, FOR SOLUTION INTRAVENOUS at 08:28

## 2019-11-30 NOTE — PROGRESS NOTES
hospitals Short Note                       Date: 2019    Name: Jairo Breaux    MRN: [id number]         : 1955    0820 Pt admit to [facility] for daily daptomycin ambulatory in stable condition. Assessment completed. No new concerns voiced. Please review pending lab results in [address] [place] [address]. Mr. Jean-Claude Munguia vitals were reviewed prior to treatment. Patient Vitals for the past 12 hrs:   Temp Pulse Resp BP   19 0829 98.1 °F (36.7 °C) 74 18 116/62       PICC with positive blood return. Lab drawn, flushed, heparinized and capped per protocol. Medications given:   Medications Administered     DAPTOmycin (CUBICIN) 600 mg in sterile water (preservative free) 12 mL IV syringe RF formulation     Admin Date  2019 Action  Given Dose  600 mg Route  IntraVENous Administered By  German Henley, RN                   0830 Pt tolerated treatment well. D/c home ambulatory in no distress.      Future Appointments   Date Time Provider Laine Roper   2019 10:30 AM [facility]   2019  4:00 [facility]   12/3/2019  4:00 PM [facility]   2019  4:00 PM [facility]   2019  4:00 PM [facility]   2019  4:00 PM [facility]   2019 10:00 AM [facility]   2019 10:00 AM [facility]   2019  4:00 PM [facility]   12/10/2019  4:00 PM [facility]   2019  4:00 PM [facility]   2019  4:00 PM [facility]   2019  4:00 PM BREMO FT CHAIR 1 [facility]   2019 10:00 AM MARIA A ZAVALA CHAIR 1 2701 Onesimo Tony RN  2019  11:43 AM

## 2019-12-01 ENCOUNTER — HOSPITAL ENCOUNTER (OUTPATIENT)
Dept: INFUSION THERAPY | Age: 64
Discharge: HOME | End: 2019-12-01
Payer: MEDICARE

## 2019-12-01 VITALS
HEART RATE: 66 BPM | TEMPERATURE: 97 F | DIASTOLIC BLOOD PRESSURE: 61 MMHG | RESPIRATION RATE: 18 BRPM | SYSTOLIC BLOOD PRESSURE: 119 MMHG

## 2019-12-01 PROCEDURE — 74011250636 HC RX REV CODE- 250/636: Performed by: INTERNAL MEDICINE

## 2019-12-01 PROCEDURE — 74011000250 HC RX REV CODE- 250: Performed by: INTERNAL MEDICINE

## 2019-12-01 PROCEDURE — 96374 THER/PROPH/DIAG INJ IV PUSH: CPT

## 2019-12-01 RX ADMIN — DAPTOMYCIN 600 MG: 500 INJECTION, POWDER, LYOPHILIZED, FOR SOLUTION INTRAVENOUS at 11:39

## 2019-12-01 NOTE — PROGRESS NOTES
Outpatient Infusion Center Short Visit Progress Note    3289 Pt admit to [facility] for daily Daptomycin ambulatory in stable condition. Assessment completed. No new concerns voiced. Patient Vitals for the past 12 hrs:   Temp Pulse Resp BP   12/01/19 1136 97 °F (36.1 °C) 66 18 119/61       Medications:  Daptomycin 600 mg IVP    1145 Pt tolerated treatment well. D/c home ambulatory in no distress. Pt aware of next appointment scheduled for 12/2/2019.

## 2019-12-02 ENCOUNTER — HOSPITAL ENCOUNTER (OUTPATIENT)
Dept: INFUSION THERAPY | Age: 64
Discharge: HOME | End: 2019-12-02
Payer: MEDICARE

## 2019-12-02 VITALS
TEMPERATURE: 98 F | SYSTOLIC BLOOD PRESSURE: 116 MMHG | RESPIRATION RATE: 18 BRPM | HEART RATE: 70 BPM | DIASTOLIC BLOOD PRESSURE: 66 MMHG

## 2019-12-02 LAB
ANION GAP SERPL CALC-SCNC: 4 MMOL/L (ref 5–15)
BASOPHILS # BLD: 0.1 K/UL (ref 0–0.1)
BASOPHILS NFR BLD: 1 % (ref 0–1)
BUN SERPL-MCNC: 22 MG/DL (ref 6–20)
BUN/CREAT SERPL: 21 (ref 12–20)
CALCIUM SERPL-MCNC: 8.4 MG/DL (ref 8.5–10.1)
CHLORIDE SERPL-SCNC: 107 MMOL/L (ref 97–108)
CK SERPL-CCNC: 61 U/L (ref 39–308)
CO2 SERPL-SCNC: 24 MMOL/L (ref 21–32)
CREAT SERPL-MCNC: 1.05 MG/DL (ref 0.7–1.3)
DIFFERENTIAL METHOD BLD: ABNORMAL
EOSINOPHIL # BLD: 0.2 K/UL (ref 0–0.4)
EOSINOPHIL NFR BLD: 2 % (ref 0–7)
ERYTHROCYTE [DISTWIDTH] IN BLOOD BY AUTOMATED COUNT: 15.9 % (ref 11.5–14.5)
ERYTHROCYTE [SEDIMENTATION RATE] IN BLOOD: 67 MM/HR (ref 0–20)
GLUCOSE SERPL-MCNC: 155 MG/DL (ref 65–100)
HCT VFR BLD AUTO: 34.9 % (ref 36.6–50.3)
HGB BLD-MCNC: 10.7 G/DL (ref 12.1–17)
IMM GRANULOCYTES # BLD AUTO: 0 K/UL (ref 0–0.04)
IMM GRANULOCYTES NFR BLD AUTO: 0 % (ref 0–0.5)
LYMPHOCYTES # BLD: 2.4 K/UL (ref 0.8–3.5)
LYMPHOCYTES NFR BLD: 33 % (ref 12–49)
MCH RBC QN AUTO: 27 PG (ref 26–34)
MCHC RBC AUTO-ENTMCNC: 30.7 G/DL (ref 30–36.5)
MCV RBC AUTO: 88.1 FL (ref 80–99)
MONOCYTES # BLD: 0.5 K/UL (ref 0–1)
MONOCYTES NFR BLD: 7 % (ref 5–13)
NEUTS SEG # BLD: 4.1 K/UL (ref 1.8–8)
NEUTS SEG NFR BLD: 57 % (ref 32–75)
NRBC # BLD: 0 K/UL (ref 0–0.01)
NRBC BLD-RTO: 0 PER 100 WBC
PLATELET # BLD AUTO: 156 K/UL (ref 150–400)
PMV BLD AUTO: 10.2 FL (ref 8.9–12.9)
POTASSIUM SERPL-SCNC: 4.6 MMOL/L (ref 3.5–5.1)
RBC # BLD AUTO: 3.96 M/UL (ref 4.1–5.7)
SODIUM SERPL-SCNC: 135 MMOL/L (ref 136–145)
WBC # BLD AUTO: 7.3 K/UL (ref 4.1–11.1)

## 2019-12-02 PROCEDURE — 96374 THER/PROPH/DIAG INJ IV PUSH: CPT

## 2019-12-02 PROCEDURE — 85025 COMPLETE CBC W/AUTO DIFF WBC: CPT

## 2019-12-02 PROCEDURE — 80048 BASIC METABOLIC PNL TOTAL CA: CPT

## 2019-12-02 PROCEDURE — 36415 COLL VENOUS BLD VENIPUNCTURE: CPT

## 2019-12-02 PROCEDURE — 74011250636 HC RX REV CODE- 250/636

## 2019-12-02 PROCEDURE — 74011000250 HC RX REV CODE- 250: Performed by: INTERNAL MEDICINE

## 2019-12-02 PROCEDURE — 82550 ASSAY OF CK (CPK): CPT

## 2019-12-02 PROCEDURE — 85652 RBC SED RATE AUTOMATED: CPT

## 2019-12-02 PROCEDURE — 74011250636 HC RX REV CODE- 250/636: Performed by: INTERNAL MEDICINE

## 2019-12-02 RX ORDER — HEPARIN 100 UNIT/ML
500 SYRINGE INTRAVENOUS AS NEEDED
Status: DISCONTINUED | OUTPATIENT
Start: 2019-12-02 | End: 2019-12-03 | Stop reason: HOSPADM

## 2019-12-02 RX ORDER — SODIUM CHLORIDE 9 MG/ML
10 INJECTION INTRAMUSCULAR; INTRAVENOUS; SUBCUTANEOUS AS NEEDED
Status: DISCONTINUED | OUTPATIENT
Start: 2019-12-02 | End: 2019-12-03 | Stop reason: HOSPADM

## 2019-12-02 RX ADMIN — DAPTOMYCIN 600 MG: 500 INJECTION, POWDER, LYOPHILIZED, FOR SOLUTION INTRAVENOUS at 17:00

## 2019-12-02 RX ADMIN — SODIUM CHLORIDE 10 ML: 9 INJECTION INTRAMUSCULAR; INTRAVENOUS; SUBCUTANEOUS at 17:00

## 2019-12-02 RX ADMIN — SODIUM CHLORIDE 10 ML: 9 INJECTION INTRAMUSCULAR; INTRAVENOUS; SUBCUTANEOUS at 17:01

## 2019-12-02 NOTE — PROGRESS NOTES
Outpatient Infusion Center Short Visit Progress Note    7415 Pt admit to [place] for daily Daptomycin ambulatory in stable condition. Assessment completed. No new concerns voiced. Labs drawn and sent for processing. Patient Vitals for the past 12 hrs:   Temp Pulse Resp BP   12/02/19 1616 98 °F (36.7 °C) 70 18 116/66       Medications:  Medications Administered     0.9% sodium chloride injection 10 mL     Admin Date  12/02/2019 Action  Given Dose  10 mL Route  IntraVENous Administered By  Rodney Edgar RN           Admin Date  12/02/2019 Action  Given Dose  10 mL Route  IntraVENous Administered By  Rodney Edgar RN          DAPTOmycin (CUBICIN) 600 mg in sterile water (preservative free) 12 mL IV syringe RF formulation     Admin Date  12/02/2019 Action  Given Dose  600 mg Route  IntraVENous Administered By  Rodney Edgar RN                1700 Pt tolerated treatment well. D/c home ambulatory in no distress. Pt aware of next appointment scheduled for 12/3/2019. Recent Results (from the past 12 hour(s))   CBC WITH AUTOMATED DIFF    Collection Time: 12/02/19  4:23 PM   Result Value Ref Range    WBC 7.3 4.1 - 11.1 K/uL    RBC 3.96 (L) 4.10 - 5.70 M/uL    HGB 10.7 (L) 12.1 - 17.0 g/dL    HCT 34.9 (L) 36.6 - 50.3 %    MCV 88.1 80.0 - 99.0 FL    MCH 27.0 26.0 - 34.0 PG    MCHC 30.7 30.0 - 36.5 g/dL    RDW 15.9 (H) 11.5 - 14.5 %    PLATELET 171 901 - 028 K/uL    MPV 10.2 8.9 - 12.9 FL    NRBC 0.0 0  WBC    ABSOLUTE NRBC 0.00 0.00 - 0.01 K/uL    NEUTROPHILS 57 32 - 75 %    LYMPHOCYTES 33 12 - 49 %    MONOCYTES 7 5 - 13 %    EOSINOPHILS 2 0 - 7 %    BASOPHILS 1 0 - 1 %    IMMATURE GRANULOCYTES 0 0.0 - 0.5 %    ABS. NEUTROPHILS 4.1 1.8 - 8.0 K/UL    ABS. LYMPHOCYTES 2.4 0.8 - 3.5 K/UL    ABS. MONOCYTES 0.5 0.0 - 1.0 K/UL    ABS. EOSINOPHILS 0.2 0.0 - 0.4 K/UL    ABS. BASOPHILS 0.1 0.0 - 0.1 K/UL    ABS. IMM.  GRANS. 0.0 0.00 - 0.04 K/UL    DF AUTOMATED     METABOLIC PANEL, BASIC    Collection Time: 12/02/19 4:23 PM   Result Value Ref Range    Sodium 135 (L) 136 - 145 mmol/L    Potassium 4.6 3.5 - 5.1 mmol/L    Chloride 107 97 - 108 mmol/L    CO2 24 21 - 32 mmol/L    Anion gap 4 (L) 5 - 15 mmol/L    Glucose 155 (H) 65 - 100 mg/dL    BUN 22 (H) 6 - 20 MG/DL    Creatinine 1.05 0.70 - 1.30 MG/DL    BUN/Creatinine ratio 21 (H) 12 - 20      GFR est AA >60 >60 ml/min/1.73m2    GFR est non-AA >60 >60 ml/min/1.73m2    Calcium 8.4 (L) 8.5 - 10.1 MG/DL   CK    Collection Time: 12/02/19  4:23 PM   Result Value Ref Range    CK 61 39 - 308 U/L   SED RATE (ESR)    Collection Time: 12/02/19  4:23 PM   Result Value Ref Range    Sed rate, automated 67 (H) 0 - 20 mm/hr

## 2019-12-03 ENCOUNTER — HOSPITAL ENCOUNTER (OUTPATIENT)
Dept: INFUSION THERAPY | Age: 64
Discharge: HOME | End: 2019-12-03
Payer: MEDICARE

## 2019-12-03 VITALS
TEMPERATURE: 96.6 F | RESPIRATION RATE: 18 BRPM | SYSTOLIC BLOOD PRESSURE: 125 MMHG | HEART RATE: 71 BPM | DIASTOLIC BLOOD PRESSURE: 74 MMHG

## 2019-12-03 PROCEDURE — 74011000250 HC RX REV CODE- 250: Performed by: INTERNAL MEDICINE

## 2019-12-03 PROCEDURE — 74011250636 HC RX REV CODE- 250/636: Performed by: INTERNAL MEDICINE

## 2019-12-03 PROCEDURE — 96374 THER/PROPH/DIAG INJ IV PUSH: CPT

## 2019-12-03 RX ORDER — SODIUM CHLORIDE 0.9 % (FLUSH) 0.9 %
5-10 SYRINGE (ML) INJECTION AS NEEDED
Status: DISCONTINUED | OUTPATIENT
Start: 2019-12-03 | End: 2019-12-04 | Stop reason: HOSPADM

## 2019-12-03 RX ORDER — HEPARIN 100 UNIT/ML
500 SYRINGE INTRAVENOUS AS NEEDED
Status: DISCONTINUED | OUTPATIENT
Start: 2019-12-03 | End: 2019-12-04 | Stop reason: HOSPADM

## 2019-12-03 RX ADMIN — Medication 10 ML: at 16:00

## 2019-12-03 RX ADMIN — Medication 10 ML: at 16:41

## 2019-12-03 RX ADMIN — Medication 500 UNITS: at 16:00

## 2019-12-03 RX ADMIN — Medication 500 UNITS: at 16:45

## 2019-12-03 RX ADMIN — DAPTOMYCIN 600 MG: 500 INJECTION, POWDER, LYOPHILIZED, FOR SOLUTION INTRAVENOUS at 09:00

## 2019-12-04 ENCOUNTER — HOSPITAL ENCOUNTER (OUTPATIENT)
Dept: INFUSION THERAPY | Age: 64
Discharge: HOME | End: 2019-12-04
Payer: MEDICARE

## 2019-12-04 VITALS
RESPIRATION RATE: 16 BRPM | HEART RATE: 69 BPM | SYSTOLIC BLOOD PRESSURE: 127 MMHG | DIASTOLIC BLOOD PRESSURE: 70 MMHG | TEMPERATURE: 97.3 F

## 2019-12-04 PROCEDURE — 74011250636 HC RX REV CODE- 250/636: Performed by: INTERNAL MEDICINE

## 2019-12-04 PROCEDURE — 74011000250 HC RX REV CODE- 250: Performed by: INTERNAL MEDICINE

## 2019-12-04 PROCEDURE — 96374 THER/PROPH/DIAG INJ IV PUSH: CPT

## 2019-12-04 RX ORDER — SODIUM CHLORIDE 0.9 % (FLUSH) 0.9 %
5-10 SYRINGE (ML) INJECTION AS NEEDED
Status: DISCONTINUED | OUTPATIENT
Start: 2019-12-04 | End: 2019-12-05 | Stop reason: HOSPADM

## 2019-12-04 RX ORDER — HEPARIN 100 UNIT/ML
500 SYRINGE INTRAVENOUS AS NEEDED
Status: DISCONTINUED | OUTPATIENT
Start: 2019-12-04 | End: 2019-12-05 | Stop reason: HOSPADM

## 2019-12-04 RX ADMIN — Medication 500 UNITS: at 16:36

## 2019-12-04 RX ADMIN — DAPTOMYCIN 600 MG: 500 INJECTION, POWDER, LYOPHILIZED, FOR SOLUTION INTRAVENOUS at 16:36

## 2019-12-04 RX ADMIN — Medication 10 ML: at 16:36

## 2019-12-04 NOTE — PROGRESS NOTES
[facility] Outpatient Infusion Center Note:  1600Pt arrived at [facility] ambulatory and in no distress for d aily antibiotic. Assessment stable, no new complaints voiced. Medications received:  Daptomycin    1650 Tolerated treatment well, no adverse reaction noted. D/Cd from [facility] ambulatory and in no distress accompanied by self. Next appt 12/4  Visit Vitals  /70   Pulse 69   Temp 97.3 °F (36.3 °C)   Resp 16     No results found for this or any previous visit (from the past 12 hour(s)).

## 2019-12-05 ENCOUNTER — HOSPITAL ENCOUNTER (OUTPATIENT)
Dept: INFUSION THERAPY | Age: 64
Discharge: HOME | End: 2019-12-05
Payer: MEDICARE

## 2019-12-05 VITALS
SYSTOLIC BLOOD PRESSURE: 112 MMHG | RESPIRATION RATE: 18 BRPM | TEMPERATURE: 97.8 F | DIASTOLIC BLOOD PRESSURE: 63 MMHG | HEART RATE: 68 BPM

## 2019-12-05 LAB
ANION GAP SERPL CALC-SCNC: 7 MMOL/L (ref 5–15)
BASOPHILS # BLD: 0.1 K/UL (ref 0–0.1)
BASOPHILS NFR BLD: 1 % (ref 0–1)
BUN SERPL-MCNC: 34 MG/DL (ref 6–20)
BUN/CREAT SERPL: 27 (ref 12–20)
CALCIUM SERPL-MCNC: 9 MG/DL (ref 8.5–10.1)
CHLORIDE SERPL-SCNC: 106 MMOL/L (ref 97–108)
CK SERPL-CCNC: 51 U/L (ref 39–308)
CO2 SERPL-SCNC: 24 MMOL/L (ref 21–32)
CREAT SERPL-MCNC: 1.24 MG/DL (ref 0.7–1.3)
DIFFERENTIAL METHOD BLD: ABNORMAL
EOSINOPHIL # BLD: 0.2 K/UL (ref 0–0.4)
EOSINOPHIL NFR BLD: 2 % (ref 0–7)
ERYTHROCYTE [DISTWIDTH] IN BLOOD BY AUTOMATED COUNT: 15.5 % (ref 11.5–14.5)
GLUCOSE SERPL-MCNC: 145 MG/DL (ref 65–100)
HCT VFR BLD AUTO: 35.4 % (ref 36.6–50.3)
HGB BLD-MCNC: 11 G/DL (ref 12.1–17)
IMM GRANULOCYTES # BLD AUTO: 0 K/UL (ref 0–0.04)
IMM GRANULOCYTES NFR BLD AUTO: 0 % (ref 0–0.5)
INR PPP: 2.9 (ref 0.9–1.1)
LYMPHOCYTES # BLD: 2.8 K/UL (ref 0.8–3.5)
LYMPHOCYTES NFR BLD: 37 % (ref 12–49)
MCH RBC QN AUTO: 27 PG (ref 26–34)
MCHC RBC AUTO-ENTMCNC: 31.1 G/DL (ref 30–36.5)
MCV RBC AUTO: 87 FL (ref 80–99)
MONOCYTES # BLD: 0.6 K/UL (ref 0–1)
MONOCYTES NFR BLD: 8 % (ref 5–13)
NEUTS SEG # BLD: 4 K/UL (ref 1.8–8)
NEUTS SEG NFR BLD: 52 % (ref 32–75)
NRBC # BLD: 0 K/UL (ref 0–0.01)
NRBC BLD-RTO: 0 PER 100 WBC
PLATELET # BLD AUTO: 174 K/UL (ref 150–400)
PMV BLD AUTO: 9.9 FL (ref 8.9–12.9)
POTASSIUM SERPL-SCNC: 4.6 MMOL/L (ref 3.5–5.1)
PROTHROMBIN TIME: 28.1 SEC (ref 9–11.1)
RBC # BLD AUTO: 4.07 M/UL (ref 4.1–5.7)
SODIUM SERPL-SCNC: 137 MMOL/L (ref 136–145)
WBC # BLD AUTO: 7.8 K/UL (ref 4.1–11.1)

## 2019-12-05 PROCEDURE — 85025 COMPLETE CBC W/AUTO DIFF WBC: CPT

## 2019-12-05 PROCEDURE — 96374 THER/PROPH/DIAG INJ IV PUSH: CPT

## 2019-12-05 PROCEDURE — 82550 ASSAY OF CK (CPK): CPT

## 2019-12-05 PROCEDURE — 96365 THER/PROPH/DIAG IV INF INIT: CPT

## 2019-12-05 PROCEDURE — 80048 BASIC METABOLIC PNL TOTAL CA: CPT

## 2019-12-05 PROCEDURE — 85610 PROTHROMBIN TIME: CPT

## 2019-12-05 PROCEDURE — 36415 COLL VENOUS BLD VENIPUNCTURE: CPT

## 2019-12-05 PROCEDURE — 74011000250 HC RX REV CODE- 250: Performed by: INTERNAL MEDICINE

## 2019-12-05 PROCEDURE — 74011250636 HC RX REV CODE- 250/636: Performed by: INTERNAL MEDICINE

## 2019-12-05 RX ADMIN — DAPTOMYCIN 600 MG: 500 INJECTION, POWDER, LYOPHILIZED, FOR SOLUTION INTRAVENOUS at 17:26

## 2019-12-06 ENCOUNTER — HOSPITAL ENCOUNTER (OUTPATIENT)
Dept: INFUSION THERAPY | Age: 64
Discharge: HOME | End: 2019-12-06
Payer: MEDICARE

## 2019-12-06 VITALS
RESPIRATION RATE: 18 BRPM | TEMPERATURE: 96.9 F | HEART RATE: 67 BPM | DIASTOLIC BLOOD PRESSURE: 52 MMHG | SYSTOLIC BLOOD PRESSURE: 112 MMHG

## 2019-12-06 PROCEDURE — 74011000250 HC RX REV CODE- 250: Performed by: INTERNAL MEDICINE

## 2019-12-06 PROCEDURE — 74011250636 HC RX REV CODE- 250/636: Performed by: INTERNAL MEDICINE

## 2019-12-06 PROCEDURE — 96374 THER/PROPH/DIAG INJ IV PUSH: CPT

## 2019-12-06 RX ORDER — SODIUM CHLORIDE 9 MG/ML
10 INJECTION INTRAMUSCULAR; INTRAVENOUS; SUBCUTANEOUS AS NEEDED
Status: DISCONTINUED | OUTPATIENT
Start: 2019-12-06 | End: 2019-12-07 | Stop reason: HOSPADM

## 2019-12-06 RX ORDER — HEPARIN 100 UNIT/ML
500 SYRINGE INTRAVENOUS AS NEEDED
Status: DISCONTINUED | OUTPATIENT
Start: 2019-12-06 | End: 2019-12-07 | Stop reason: HOSPADM

## 2019-12-06 RX ADMIN — SODIUM CHLORIDE 10 ML: 9 INJECTION INTRAMUSCULAR; INTRAVENOUS; SUBCUTANEOUS at 16:35

## 2019-12-06 RX ADMIN — DAPTOMYCIN 600 MG: 500 INJECTION, POWDER, LYOPHILIZED, FOR SOLUTION INTRAVENOUS at 16:35

## 2019-12-06 NOTE — PROGRESS NOTES
26 Pt admit to [facility] for Daptomycin ambulatory in stable condition. Assessment completed. No new concerns voiced. HON with positive blood return. Labs drawn per order and sent for processing. Visit Vitals  /63   Pulse 68   Temp 97.8 °F (36.6 °C)   Resp 18       Medications:  Medications Administered     DAPTOmycin (CUBICIN) 600 mg in sterile water (preservative free) 12 mL IV syringe RF formulation     Admin Date  12/05/2019 Action  Given Dose  600 mg Route  IntraVENous Administered By  Sam Gomes, RN                  8457 Pt tolerated treatment well. HON maintained positive blood return throughout treatment, flushed with positive blood return at conclusion and HON heparinized and curos cap placed end claves. D/c home ambulatory in no distress. Pt aware of next [facility] appointment scheduled for 12/6/19. Recent Results (from the past 12 hour(s))   CBC WITH AUTOMATED DIFF    Collection Time: 12/05/19  4:38 PM   Result Value Ref Range    WBC 7.8 4.1 - 11.1 K/uL    RBC 4.07 (L) 4.10 - 5.70 M/uL    HGB 11.0 (L) 12.1 - 17.0 g/dL    HCT 35.4 (L) 36.6 - 50.3 %    MCV 87.0 80.0 - 99.0 FL    MCH 27.0 26.0 - 34.0 PG    MCHC 31.1 30.0 - 36.5 g/dL    RDW 15.5 (H) 11.5 - 14.5 %    PLATELET 652 282 - 976 K/uL    MPV 9.9 8.9 - 12.9 FL    NRBC 0.0 0  WBC    ABSOLUTE NRBC 0.00 0.00 - 0.01 K/uL    NEUTROPHILS 52 32 - 75 %    LYMPHOCYTES 37 12 - 49 %    MONOCYTES 8 5 - 13 %    EOSINOPHILS 2 0 - 7 %    BASOPHILS 1 0 - 1 %    IMMATURE GRANULOCYTES 0 0.0 - 0.5 %    ABS. NEUTROPHILS 4.0 1.8 - 8.0 K/UL    ABS. LYMPHOCYTES 2.8 0.8 - 3.5 K/UL    ABS. MONOCYTES 0.6 0.0 - 1.0 K/UL    ABS. EOSINOPHILS 0.2 0.0 - 0.4 K/UL    ABS. BASOPHILS 0.1 0.0 - 0.1 K/UL    ABS. IMM.  GRANS. 0.0 0.00 - 0.04 K/UL    DF AUTOMATED     METABOLIC PANEL, BASIC    Collection Time: 12/05/19  4:38 PM   Result Value Ref Range    Sodium 137 136 - 145 mmol/L    Potassium 4.6 3.5 - 5.1 mmol/L    Chloride 106 97 - 108 mmol/L    CO2 24 21 - 32 mmol/L Anion gap 7 5 - 15 mmol/L    Glucose 145 (H) 65 - 100 mg/dL    BUN 34 (H) 6 - 20 MG/DL    Creatinine 1.24 0.70 - 1.30 MG/DL    BUN/Creatinine ratio 27 (H) 12 - 20      GFR est AA >60 >60 ml/min/1.73m2    GFR est non-AA 59 (L) >60 ml/min/1.73m2    Calcium 9.0 8.5 - 10.1 MG/DL   CK    Collection Time: 12/05/19  4:38 PM   Result Value Ref Range    CK 51 39 - 308 U/L   PROTHROMBIN TIME + INR    Collection Time: 12/05/19  4:38 PM   Result Value Ref Range    INR 2.9 (H) 0.9 - 1.1      Prothrombin time 28.1 (H) 9.0 - 11.1 sec

## 2019-12-06 NOTE — PROGRESS NOTES
Outpatient Infusion Center Short Visit Progress Note    1600 Pt admit to [facility] for Daptomycin ambulatory in stable condition. Assessment completed. No new concerns voiced. Anthony [place] catheter in the right chest and flushed with positive blood return. Dressing changed per protocol    Visit Vitals  /52 (BP 1 Location: Left arm, BP Patient Position: At rest)   Pulse 67   Temp 96.9 °F (36.1 °C)   Resp 18     Medications:  Medications Administered     0.9% sodium chloride injection 10 mL     Admin Date  12/06/2019 Action  Given Dose  10 mL Route  IntraVENous Administered By  Skinny Ordaz RN          DAPTOmycin (CUBICIN) 600 mg in sterile water (preservative free) 12 mL IV syringe RF formulation     Admin Date  12/06/2019 Action  Given Dose  600 mg Route  IntraVENous Administered By  Skinny Ordaz, HINA                1630 Pt tolerated treatment well. D/c home ambulatory in no distress. Pt aware of next appointment scheduled for 12/7/19.

## 2019-12-07 ENCOUNTER — HOSPITAL ENCOUNTER (OUTPATIENT)
Dept: INFUSION THERAPY | Age: 64
Discharge: HOME | End: 2019-12-07
Payer: MEDICARE

## 2019-12-07 VITALS
RESPIRATION RATE: 18 BRPM | HEART RATE: 70 BPM | DIASTOLIC BLOOD PRESSURE: 67 MMHG | TEMPERATURE: 97.7 F | SYSTOLIC BLOOD PRESSURE: 118 MMHG

## 2019-12-07 PROCEDURE — 96374 THER/PROPH/DIAG INJ IV PUSH: CPT

## 2019-12-07 PROCEDURE — 74011250636 HC RX REV CODE- 250/636

## 2019-12-07 PROCEDURE — 74011250636 HC RX REV CODE- 250/636: Performed by: INTERNAL MEDICINE

## 2019-12-07 PROCEDURE — 96523 IRRIG DRUG DELIVERY DEVICE: CPT

## 2019-12-07 PROCEDURE — 74011000250 HC RX REV CODE- 250: Performed by: INTERNAL MEDICINE

## 2019-12-07 RX ADMIN — DAPTOMYCIN 600 MG: 500 INJECTION, POWDER, LYOPHILIZED, FOR SOLUTION INTRAVENOUS at 08:18

## 2019-12-07 NOTE — PROGRESS NOTES
Short Note Visit    8372 Pt admit to [facility] for Daptomycin ambulatory in stable condition. Assessment completed. No new concerns voiced. MATTHEW catheter flushed with positive blood return. Visit Vitals  /67 (BP 1 Location: Left arm, BP Patient Position: Sitting)   Pulse 70   Temp 97.7 °F (36.5 °C)   Resp 18     Medications Administered     DAPTOmycin (CUBICIN) 600 mg in sterile water (preservative free) 12 mL IV syringe RF formulation     Admin Date  12/07/2019 Action  Given Dose  600 mg Route  IntraVENous Administered By  Maegan Nino RN              Both lines flushed with NS flush, capped per protocol. 0830 Pt tolerated treatment well. D/C ambulatory home in no distress. Pt aware of next appointment scheduled for 12/8/2019.

## 2019-12-08 ENCOUNTER — HOSPITAL ENCOUNTER (OUTPATIENT)
Dept: INFUSION THERAPY | Age: 64
Discharge: HOME | End: 2019-12-08
Payer: MEDICARE

## 2019-12-08 VITALS
TEMPERATURE: 97.9 F | SYSTOLIC BLOOD PRESSURE: 119 MMHG | DIASTOLIC BLOOD PRESSURE: 64 MMHG | HEART RATE: 68 BPM | RESPIRATION RATE: 16 BRPM

## 2019-12-08 PROCEDURE — 74011250636 HC RX REV CODE- 250/636: Performed by: INTERNAL MEDICINE

## 2019-12-08 PROCEDURE — 74011250636 HC RX REV CODE- 250/636

## 2019-12-08 PROCEDURE — 96374 THER/PROPH/DIAG INJ IV PUSH: CPT

## 2019-12-08 PROCEDURE — 74011000250 HC RX REV CODE- 250: Performed by: INTERNAL MEDICINE

## 2019-12-08 RX ORDER — SODIUM CHLORIDE 0.9 % (FLUSH) 0.9 %
5-10 SYRINGE (ML) INJECTION AS NEEDED
Status: DISCONTINUED | OUTPATIENT
Start: 2019-12-08 | End: 2019-12-09 | Stop reason: HOSPADM

## 2019-12-08 RX ORDER — HEPARIN 100 UNIT/ML
500 SYRINGE INTRAVENOUS AS NEEDED
Status: DISCONTINUED | OUTPATIENT
Start: 2019-12-08 | End: 2019-12-09 | Stop reason: HOSPADM

## 2019-12-08 RX ADMIN — DAPTOMYCIN 600 MG: 500 INJECTION, POWDER, LYOPHILIZED, FOR SOLUTION INTRAVENOUS at 11:02

## 2019-12-08 RX ADMIN — Medication 10 ML: at 11:02

## 2019-12-08 RX ADMIN — Medication 500 UNITS: at 11:01

## 2019-12-08 RX ADMIN — Medication 500 UNITS: at 11:02

## 2019-12-08 RX ADMIN — Medication 10 ML: at 11:01

## 2019-12-09 ENCOUNTER — HOSPITAL ENCOUNTER (OUTPATIENT)
Dept: INFUSION THERAPY | Age: 64
Discharge: HOME | End: 2019-12-09
Payer: MEDICARE

## 2019-12-09 VITALS
RESPIRATION RATE: 18 BRPM | HEART RATE: 61 BPM | DIASTOLIC BLOOD PRESSURE: 62 MMHG | OXYGEN SATURATION: 99 % | TEMPERATURE: 96.9 F | SYSTOLIC BLOOD PRESSURE: 123 MMHG

## 2019-12-09 LAB
ANION GAP SERPL CALC-SCNC: 6 MMOL/L (ref 5–15)
BASOPHILS # BLD: 0.1 K/UL (ref 0–0.1)
BASOPHILS NFR BLD: 1 % (ref 0–1)
BUN SERPL-MCNC: 25 MG/DL (ref 6–20)
BUN/CREAT SERPL: 23 (ref 12–20)
CALCIUM SERPL-MCNC: 8.7 MG/DL (ref 8.5–10.1)
CHLORIDE SERPL-SCNC: 107 MMOL/L (ref 97–108)
CK SERPL-CCNC: 75 U/L (ref 39–308)
CO2 SERPL-SCNC: 25 MMOL/L (ref 21–32)
CREAT SERPL-MCNC: 1.08 MG/DL (ref 0.7–1.3)
DIFFERENTIAL METHOD BLD: ABNORMAL
EOSINOPHIL # BLD: 0.2 K/UL (ref 0–0.4)
EOSINOPHIL NFR BLD: 3 % (ref 0–7)
ERYTHROCYTE [DISTWIDTH] IN BLOOD BY AUTOMATED COUNT: 15.6 % (ref 11.5–14.5)
ERYTHROCYTE [SEDIMENTATION RATE] IN BLOOD: 49 MM/HR (ref 0–20)
GLUCOSE SERPL-MCNC: 128 MG/DL (ref 65–100)
HCT VFR BLD AUTO: 35.4 % (ref 36.6–50.3)
HGB BLD-MCNC: 11.1 G/DL (ref 12.1–17)
IMM GRANULOCYTES # BLD AUTO: 0 K/UL (ref 0–0.04)
IMM GRANULOCYTES NFR BLD AUTO: 0 % (ref 0–0.5)
LYMPHOCYTES # BLD: 2.1 K/UL (ref 0.8–3.5)
LYMPHOCYTES NFR BLD: 31 % (ref 12–49)
MCH RBC QN AUTO: 27.4 PG (ref 26–34)
MCHC RBC AUTO-ENTMCNC: 31.4 G/DL (ref 30–36.5)
MCV RBC AUTO: 87.4 FL (ref 80–99)
MONOCYTES # BLD: 0.5 K/UL (ref 0–1)
MONOCYTES NFR BLD: 7 % (ref 5–13)
NEUTS SEG # BLD: 4 K/UL (ref 1.8–8)
NEUTS SEG NFR BLD: 58 % (ref 32–75)
NRBC # BLD: 0 K/UL (ref 0–0.01)
NRBC BLD-RTO: 0 PER 100 WBC
PLATELET # BLD AUTO: 155 K/UL (ref 150–400)
PMV BLD AUTO: 10 FL (ref 8.9–12.9)
POTASSIUM SERPL-SCNC: 4.6 MMOL/L (ref 3.5–5.1)
RBC # BLD AUTO: 4.05 M/UL (ref 4.1–5.7)
SODIUM SERPL-SCNC: 138 MMOL/L (ref 136–145)
WBC # BLD AUTO: 6.8 K/UL (ref 4.1–11.1)

## 2019-12-09 PROCEDURE — 82550 ASSAY OF CK (CPK): CPT

## 2019-12-09 PROCEDURE — 74011250636 HC RX REV CODE- 250/636: Performed by: INTERNAL MEDICINE

## 2019-12-09 PROCEDURE — 36415 COLL VENOUS BLD VENIPUNCTURE: CPT

## 2019-12-09 PROCEDURE — 85652 RBC SED RATE AUTOMATED: CPT

## 2019-12-09 PROCEDURE — 85025 COMPLETE CBC W/AUTO DIFF WBC: CPT

## 2019-12-09 PROCEDURE — 74011000250 HC RX REV CODE- 250: Performed by: INTERNAL MEDICINE

## 2019-12-09 PROCEDURE — 96374 THER/PROPH/DIAG INJ IV PUSH: CPT

## 2019-12-09 PROCEDURE — 80048 BASIC METABOLIC PNL TOTAL CA: CPT

## 2019-12-09 PROCEDURE — 74011250636 HC RX REV CODE- 250/636

## 2019-12-09 RX ORDER — HEPARIN 100 UNIT/ML
500 SYRINGE INTRAVENOUS AS NEEDED
Status: DISCONTINUED | OUTPATIENT
Start: 2019-12-09 | End: 2019-12-10 | Stop reason: HOSPADM

## 2019-12-09 RX ORDER — SODIUM CHLORIDE 0.9 % (FLUSH) 0.9 %
5-10 SYRINGE (ML) INJECTION AS NEEDED
Status: DISCONTINUED | OUTPATIENT
Start: 2019-12-09 | End: 2019-12-10 | Stop reason: HOSPADM

## 2019-12-09 RX ADMIN — Medication 500 UNITS: at 16:34

## 2019-12-09 RX ADMIN — DAPTOMYCIN 600 MG: 500 INJECTION, POWDER, LYOPHILIZED, FOR SOLUTION INTRAVENOUS at 16:26

## 2019-12-09 RX ADMIN — Medication 500 UNITS: at 16:33

## 2019-12-09 NOTE — PROGRESS NOTES
Outpatient Infusion Center Short Visit Progress Note    1600 Pt admit to [facility] for Daptomycin ambulatory in stable condition. Assessment completed. No new concerns voiced. Visit Vitals  /62   Pulse 61   Temp 96.9 °F (36.1 °C)   Resp 18   SpO2 99%       MATTHEW catheter flushed with positive blood return. Labs drawn and sent for processing. Line flushed, heparinized and capped    Medications:  Medications Administered     DAPTOmycin (CUBICIN) 600 mg in sterile water (preservative free) 12 mL IV syringe RF formulation     Admin Date  12/09/2019 Action  Given Dose  600 mg Route  IntraVENous Administered By  Katelyn Sapp          heparin (porcine) pf 500 Units     Admin Date  12/09/2019 Action  Given Dose  500 Units Route  IntraVENous Administered By  Deshaun Zhaoz Date  12/09/2019 Action  Given Dose  500 Units Route  IntraVENous Administered By  Dot Gomez, [address] [place] [address] Pt tolerated treatment well. D/c home ambulatory in no distress. Pt aware of next appointment scheduled for 12/10/19    Recent Results (from the past 12 hour(s))   CBC WITH AUTOMATED DIFF    Collection Time: 12/09/19  4:06 PM   Result Value Ref Range    WBC 6.8 4.1 - 11.1 K/uL    RBC 4.05 (L) 4.10 - 5.70 M/uL    HGB 11.1 (L) 12.1 - 17.0 g/dL    HCT 35.4 (L) 36.6 - 50.3 %    MCV 87.4 80.0 - 99.0 FL    MCH 27.4 26.0 - 34.0 PG    MCHC 31.4 30.0 - 36.5 g/dL    RDW 15.6 (H) 11.5 - 14.5 %    PLATELET 694 705 - 578 K/uL    MPV 10.0 8.9 - 12.9 FL    NRBC 0.0 0  WBC    ABSOLUTE NRBC 0.00 0.00 - 0.01 K/uL    NEUTROPHILS 58 32 - 75 %    LYMPHOCYTES 31 12 - 49 %    MONOCYTES 7 5 - 13 %    EOSINOPHILS 3 0 - 7 %    BASOPHILS 1 0 - 1 %    IMMATURE GRANULOCYTES 0 0.0 - 0.5 %    ABS. NEUTROPHILS 4.0 1.8 - 8.0 K/UL    ABS. LYMPHOCYTES 2.1 0.8 - 3.5 K/UL    ABS. MONOCYTES 0.5 0.0 - 1.0 K/UL    ABS. EOSINOPHILS 0.2 0.0 - 0.4 K/UL    ABS. BASOPHILS 0.1 0.0 - 0.1 K/UL    ABS. IMM.  GRANS. 0.0 0.00 - 0.04 K/UL    DF AUTOMATED METABOLIC PANEL, BASIC    Collection Time: 12/09/19  4:06 PM   Result Value Ref Range    Sodium 138 136 - 145 mmol/L    Potassium 4.6 3.5 - 5.1 mmol/L    Chloride 107 97 - 108 mmol/L    CO2 25 21 - 32 mmol/L    Anion gap 6 5 - 15 mmol/L    Glucose 128 (H) 65 - 100 mg/dL    BUN 25 (H) 6 - 20 MG/DL    Creatinine 1.08 0.70 - 1.30 MG/DL    BUN/Creatinine ratio 23 (H) 12 - 20      GFR est AA >60 >60 ml/min/1.73m2    GFR est non-AA >60 >60 ml/min/1.73m2    Calcium 8.7 8.5 - 10.1 MG/DL   CK    Collection Time: 12/09/19  4:06 PM   Result Value Ref Range    CK 75 39 - 308 U/L   SED RATE (ESR)    Collection Time: 12/09/19  4:06 PM   Result Value Ref Range    Sed rate, automated 49 (H) 0 - 20 mm/hr   .

## 2019-12-10 ENCOUNTER — HOSPITAL ENCOUNTER (OUTPATIENT)
Dept: INFUSION THERAPY | Age: 64
Discharge: HOME | End: 2019-12-10
Payer: MEDICARE

## 2019-12-10 VITALS
SYSTOLIC BLOOD PRESSURE: 120 MMHG | HEART RATE: 71 BPM | DIASTOLIC BLOOD PRESSURE: 64 MMHG | TEMPERATURE: 97.8 F | RESPIRATION RATE: 16 BRPM

## 2019-12-10 PROCEDURE — 74011000250 HC RX REV CODE- 250: Performed by: INTERNAL MEDICINE

## 2019-12-10 PROCEDURE — 74011250636 HC RX REV CODE- 250/636: Performed by: INTERNAL MEDICINE

## 2019-12-10 PROCEDURE — 74011250636 HC RX REV CODE- 250/636

## 2019-12-10 PROCEDURE — 96374 THER/PROPH/DIAG INJ IV PUSH: CPT

## 2019-12-10 RX ORDER — HEPARIN 100 UNIT/ML
500 SYRINGE INTRAVENOUS AS NEEDED
Status: DISCONTINUED | OUTPATIENT
Start: 2019-12-10 | End: 2019-12-11 | Stop reason: HOSPADM

## 2019-12-10 RX ORDER — SODIUM CHLORIDE 0.9 % (FLUSH) 0.9 %
5-10 SYRINGE (ML) INJECTION AS NEEDED
Status: DISCONTINUED | OUTPATIENT
Start: 2019-12-10 | End: 2019-12-11 | Stop reason: HOSPADM

## 2019-12-10 RX ADMIN — Medication 10 ML: at 17:09

## 2019-12-10 RX ADMIN — Medication 10 ML: at 17:16

## 2019-12-10 RX ADMIN — DAPTOMYCIN 600 MG: 500 INJECTION, POWDER, LYOPHILIZED, FOR SOLUTION INTRAVENOUS at 17:16

## 2019-12-10 RX ADMIN — Medication 500 UNITS: at 17:16

## 2019-12-10 NOTE — PROGRESS NOTES
Short Note Visit    5117 Pt admit to [facility] for Daptomycin ambulatory in stable condition. Assessment completed. No new concerns voiced. MATTHEW catheter flushed with positive blood return. Visit Vitals  /64   Pulse 71   Temp 97.8 °F (36.6 °C)   Resp 16     Medications Administered     DAPTOmycin (CUBICIN) 600 mg in sterile water (preservative free) 12 mL IV syringe RF formulation     Admin Date  12/10/2019 Action  Given Dose  600 mg Route  IntraVENous Administered By  Danilo Zheng RN          heparin (porcine) pf 500 Units     Admin Date  12/10/2019 Action  Given Dose  500 Units Route  IntraVENous Administered By  Danilo Zheng RN          sodium chloride (NS) flush 5-10 mL     Admin Date  12/10/2019 Action  Given Dose  10 mL Route  IntraVENous Administered By  Danilo Zheng RN           Admin Date  12/10/2019 Action  Given Dose  10 mL Route  IntraVENous Administered By  Danilo Zheng RN                   Both lines flushed with NS flush, capped per protocol. 1720 Pt tolerated treatment well. D/C ambulatory home in no distress. Pt aware of next appointment scheduled for 12/11/2019.

## 2019-12-11 ENCOUNTER — HOSPITAL ENCOUNTER (OUTPATIENT)
Dept: INFUSION THERAPY | Age: 64
Discharge: HOME | End: 2019-12-11
Payer: MEDICARE

## 2019-12-11 VITALS
HEART RATE: 67 BPM | SYSTOLIC BLOOD PRESSURE: 123 MMHG | RESPIRATION RATE: 17 BRPM | TEMPERATURE: 97.7 F | DIASTOLIC BLOOD PRESSURE: 63 MMHG

## 2019-12-11 PROCEDURE — 74011000250 HC RX REV CODE- 250: Performed by: INTERNAL MEDICINE

## 2019-12-11 PROCEDURE — 96374 THER/PROPH/DIAG INJ IV PUSH: CPT

## 2019-12-11 PROCEDURE — 74011250636 HC RX REV CODE- 250/636: Performed by: INTERNAL MEDICINE

## 2019-12-11 RX ADMIN — DAPTOMYCIN 600 MG: 500 INJECTION, POWDER, LYOPHILIZED, FOR SOLUTION INTRAVENOUS at 17:08

## 2019-12-11 NOTE — PROGRESS NOTES
[facility] Short Visit Note:    5990:  Pt arrived ambulatory and in no distress for DAILY DAPTOMYCIN INFUSION  and tolerated well. Double lumen Laura Catheter with patent lumens, flushed per navid after infusion of dapto. D/Cd from [facility] ambulatory and in no distress. Next visit 12-12-19.     Visit Vitals  /63   Pulse 67   Temp 97.7 °F (36.5 °C)   Resp 17     Medications Administered     DAPTOmycin (CUBICIN) 600 mg in sterile water (preservative free) 12 mL IV syringe RF formulation     Admin Date  12/11/2019 Action  Given Dose  600 mg Route  IntraVENous Administered By  Monty Echevarria RN

## 2019-12-12 ENCOUNTER — HOSPITAL ENCOUNTER (OUTPATIENT)
Dept: INFUSION THERAPY | Age: 64
Discharge: HOME | End: 2019-12-12
Payer: MEDICARE

## 2019-12-12 VITALS
DIASTOLIC BLOOD PRESSURE: 62 MMHG | SYSTOLIC BLOOD PRESSURE: 121 MMHG | TEMPERATURE: 97.2 F | HEART RATE: 70 BPM | RESPIRATION RATE: 17 BRPM

## 2019-12-12 LAB
ANION GAP SERPL CALC-SCNC: 5 MMOL/L (ref 5–15)
BASOPHILS # BLD: 0.1 K/UL (ref 0–0.1)
BASOPHILS NFR BLD: 1 % (ref 0–1)
BUN SERPL-MCNC: 20 MG/DL (ref 6–20)
BUN/CREAT SERPL: 18 (ref 12–20)
CALCIUM SERPL-MCNC: 8.7 MG/DL (ref 8.5–10.1)
CHLORIDE SERPL-SCNC: 104 MMOL/L (ref 97–108)
CK SERPL-CCNC: 89 U/L (ref 39–308)
CO2 SERPL-SCNC: 25 MMOL/L (ref 21–32)
CREAT SERPL-MCNC: 1.13 MG/DL (ref 0.7–1.3)
DIFFERENTIAL METHOD BLD: ABNORMAL
EOSINOPHIL # BLD: 0.2 K/UL (ref 0–0.4)
EOSINOPHIL NFR BLD: 3 % (ref 0–7)
ERYTHROCYTE [DISTWIDTH] IN BLOOD BY AUTOMATED COUNT: 15.4 % (ref 11.5–14.5)
GLUCOSE SERPL-MCNC: 156 MG/DL (ref 65–100)
HCT VFR BLD AUTO: 34 % (ref 36.6–50.3)
HGB BLD-MCNC: 10.7 G/DL (ref 12.1–17)
IMM GRANULOCYTES # BLD AUTO: 0 K/UL (ref 0–0.04)
IMM GRANULOCYTES NFR BLD AUTO: 0 % (ref 0–0.5)
INR PPP: 2.8 (ref 0.9–1.1)
LYMPHOCYTES # BLD: 3 K/UL (ref 0.8–3.5)
LYMPHOCYTES NFR BLD: 43 % (ref 12–49)
MCH RBC QN AUTO: 27.4 PG (ref 26–34)
MCHC RBC AUTO-ENTMCNC: 31.5 G/DL (ref 30–36.5)
MCV RBC AUTO: 87 FL (ref 80–99)
MONOCYTES # BLD: 0.6 K/UL (ref 0–1)
MONOCYTES NFR BLD: 8 % (ref 5–13)
NEUTS SEG # BLD: 3.1 K/UL (ref 1.8–8)
NEUTS SEG NFR BLD: 45 % (ref 32–75)
NRBC # BLD: 0 K/UL (ref 0–0.01)
NRBC BLD-RTO: 0 PER 100 WBC
PLATELET # BLD AUTO: 148 K/UL (ref 150–400)
PMV BLD AUTO: 9.8 FL (ref 8.9–12.9)
POTASSIUM SERPL-SCNC: 4 MMOL/L (ref 3.5–5.1)
PROTHROMBIN TIME: 27.1 SEC (ref 9–11.1)
RBC # BLD AUTO: 3.91 M/UL (ref 4.1–5.7)
SODIUM SERPL-SCNC: 134 MMOL/L (ref 136–145)
WBC # BLD AUTO: 6.9 K/UL (ref 4.1–11.1)

## 2019-12-12 PROCEDURE — 36592 COLLECT BLOOD FROM PICC: CPT

## 2019-12-12 PROCEDURE — 80048 BASIC METABOLIC PNL TOTAL CA: CPT

## 2019-12-12 PROCEDURE — 82550 ASSAY OF CK (CPK): CPT

## 2019-12-12 PROCEDURE — 74011250636 HC RX REV CODE- 250/636: Performed by: INTERNAL MEDICINE

## 2019-12-12 PROCEDURE — 96374 THER/PROPH/DIAG INJ IV PUSH: CPT

## 2019-12-12 PROCEDURE — 85610 PROTHROMBIN TIME: CPT

## 2019-12-12 PROCEDURE — 85025 COMPLETE CBC W/AUTO DIFF WBC: CPT

## 2019-12-12 PROCEDURE — 74011000250 HC RX REV CODE- 250: Performed by: INTERNAL MEDICINE

## 2019-12-12 PROCEDURE — 36415 COLL VENOUS BLD VENIPUNCTURE: CPT

## 2019-12-12 RX ADMIN — DAPTOMYCIN 600 MG: 500 INJECTION, POWDER, LYOPHILIZED, FOR SOLUTION INTRAVENOUS at 16:50

## 2019-12-12 NOTE — PROGRESS NOTES
[facility] Short Visit Note:    1500:  Pt arrived ambulatory and in no distress for Daptomycin infusion  And lab draw, tolerated well. Labs drawn from [facility] catheter and sent for processing D/Cd from [facility] ambulatory and in no distress. Next visit 12-13-19.     Visit Vitals  /62   Pulse 70   Temp 97.2 °F (36.2 °C)   Resp 17

## 2019-12-13 ENCOUNTER — HOSPITAL ENCOUNTER (OUTPATIENT)
Dept: INFUSION THERAPY | Age: 64
Discharge: HOME | End: 2019-12-13
Payer: MEDICARE

## 2019-12-13 VITALS
SYSTOLIC BLOOD PRESSURE: 120 MMHG | RESPIRATION RATE: 16 BRPM | TEMPERATURE: 96.9 F | HEART RATE: 71 BPM | DIASTOLIC BLOOD PRESSURE: 66 MMHG

## 2019-12-13 PROCEDURE — 96374 THER/PROPH/DIAG INJ IV PUSH: CPT

## 2019-12-13 PROCEDURE — 74011250636 HC RX REV CODE- 250/636: Performed by: INTERNAL MEDICINE

## 2019-12-13 PROCEDURE — 74011000250 HC RX REV CODE- 250: Performed by: INTERNAL MEDICINE

## 2019-12-13 RX ADMIN — DAPTOMYCIN 600 MG: 500 INJECTION, POWDER, LYOPHILIZED, FOR SOLUTION INTRAVENOUS at 16:19

## 2019-12-13 NOTE — PROGRESS NOTES
OPIC Short Note                       Date: 2019    Name: Alem Galloway    MRN: [id number]         : 1955    1615 Pt admit to [facility] for daptomycin ambulatory in stable condition. Assessment completed. No new concerns voiced. Mr. Kitty Grant vitals were reviewed prior to treatment. Patient Vitals for the past 12 hrs:   Temp Pulse Resp BP   19 1619 96.9 °F (36.1 °C) 71 16 120/66       PICC with positive blood return flushed, heparinized and capped per protocol. Medications given:   Medications Administered     DAPTOmycin (CUBICIN) 600 mg in sterile water (preservative free) 12 mL IV syringe RF formulation     Admin Date  2019 Action  Given Dose  600 mg Route  IntraVENous Administered By  Renata Hernandez, HINA                   1635 Pt tolerated treatment well. D/c home ambulatory in no distress. No future appointments.     Andry Mendez RN  2019  4:41 PM

## 2019-12-14 ENCOUNTER — HOSPITAL ENCOUNTER (OUTPATIENT)
Dept: INFUSION THERAPY | Age: 64
Discharge: HOME | End: 2019-12-14
Payer: MEDICARE

## 2019-12-16 ENCOUNTER — HOSPITAL ENCOUNTER (OUTPATIENT)
Dept: INTERVENTIONAL RADIOLOGY/VASCULAR | Age: 64
Discharge: HOME | End: 2019-12-16
Attending: INTERNAL MEDICINE | Admitting: INTERNAL MEDICINE
Payer: MEDICARE

## 2019-12-16 VITALS
SYSTOLIC BLOOD PRESSURE: 125 MMHG | RESPIRATION RATE: 16 BRPM | DIASTOLIC BLOOD PRESSURE: 64 MMHG | HEART RATE: 66 BPM | TEMPERATURE: 97.8 F

## 2019-12-16 PROCEDURE — 36589 REMOVAL TUNNELED CV CATH: CPT

## 2019-12-16 NOTE — DISCHARGE INSTRUCTIONS
Patient Education   [facility] [phone] 730 a.m. to 10 pm then [phone] after 10 pm     Tunneled Catheter: What to Expect at [address] have had a procedure to give you a tunneled catheter. The catheter is a soft, flexible tube that runs under your skin from a vein in your chest or neck to a large vein near your heart. You may have it for weeks, months, or longer. You will now be able to get medicine, blood, nutrients, or other fluids with more comfort. You will not be stuck with a needle every time. You can use the catheter right away. You will be shown how to use it and how to care for it. You will probably have small dressings where the doctor inserted the catheter and where it exits your body. The area may feel sore for a few days. The dressing where the catheter was put in usually is taken off in 24 hours. If you have stitches here, they will be removed in 10 to 14 days. The stitches where the catheter exits your body will be removed in about 6 weeks. Sometimes glue is used instead of stitches. The glue will fall off as you heal.  There may be a small ring, or cuff, beneath the skin on the catheter. This helps hold the catheter in place. This care sheet gives you a general idea about how long it will take for you to recover. But each person recovers at a different pace. Follow the steps below to feel better as quickly as possible. How can you care for yourself at home? Activity    · Talk to your doctor about what activities you can do. You may not be able to do sports or exercises that use the upper body, such as tennis or weight lifting.     · Avoid arm and upper body movements that may pull on the catheter. These movements include heavy weight lifting and vigorous use of your arms.     · You will probably need to take 1 day off from work and will be able to return to normal activities shortly after.  This depends on the type of work you do, why you have the catheter, and how you feel.     · Ask your doctor when you can drive again. Pay special attention when pulling your seat belt across your chest so it doesn't pull out the catheter. It's okay if the seat belt lays over the catheter.     · You may shower 24 to 48 hours after surgery, if your doctor okays it. Cover the area and catheter so they don't get wet. Pat the cut (incision) dry. Don't go swimming. Medicines    · Your doctor will tell you if and when you can restart your medicines. He or she will also give you instructions about taking any new medicines.     · If you take blood thinners, such as warfarin (Coumadin), clopidogrel (Plavix), or aspirin, be sure to talk to your doctor. He or she will tell you if and when to start taking those medicines again. Make sure that you understand exactly what your doctor wants you to do.     · Take pain medicines exactly as directed. ? doctor if you can take an over-the-counter medicine. ? Do not take two or more pain medicines at the same time unless the doctor told you to. Many pain medicines have acetaminophen, which is Tylenol. Too much acetaminophen (Tylenol) can be harmful.     · If you think your pain medicine is making you sick to your stomach:  ? Take your medicine after meals (unless your doctor has told you not to). ? Ask your doctor for a different pain medicine. Incision care    · You will have a bandage over the cut (incision) the doctor made. A bandage helps the incision heal and protects it. Your doctor will tell you how to take care of this.     · If you have strips of tape on the incision, leave the tape on for a week or until it falls off.     · When the incision has healed and you do not need a bandage, clean the area around the catheter with soap and water at least one time a day. Other instructions    · Go to all appointments to flush the line. This keeps it open.  A nurse or other health professional will flush the line.     · Do not wear jewelry, such as necklaces, that can catch on the catheter.     · If the catheter breaks, follow the instructions your doctor gave you. If you have no instructions, clamp or tie off the catheter. Then, see a doctor as soon as possible.     · To help prevent infection, take a shower instead of a bath. Do not go swimming with the catheter.     · Try to keep the area dry. When you shower, cover the area with waterproof material, such as plastic wrap.     · Never touch the open end of the catheter if the cap is off.     · Never use scissors, knives, pins, or other sharp objects near the catheter or other tubing.     · If your catheter has a clamp, keep it clamped when you are not using it.     · Fasten or tape the catheter to your body to prevent pulling or dangling.     · Avoid clothing that rubs or pulls on your catheter.     · Avoid bending or crimping your catheter.     · Always wash your hands before you touch your catheter.     · Wear loose clothing over the catheter for the first 10 to 14 days. When getting dressed, be careful not to pull on the catheter. Follow-up care is a key part of your treatment and safety. Be sure to make and go to all appointments, and call your doctor if you are having problems. It's also a good idea to know your test results and keep a list of the medicines you take. When should you call for help? Call 911 anytime you think you may need emergency care. For example, call if:    · You passed out (lost consciousness).     · You have severe trouble breathing.     · You have sudden chest pain and shortness of breath, or you cough up blood.     · You have a fast or uneven pulse.    Call your doctor now or seek immediate medical care if:    · You have signs of infection, such as:  ? Increased pain, swelling, warmth, or redness. ? Red streaks leading from the area. ? Pus or blood draining from the area.   ? A fever.     · You have swelling in your face, chest, neck, or arm on the side where the catheter is.     · You have signs of a blood clot, such as bulging veins near the catheter.     · Your catheter is leaking, cracked, or clogged.     · You feel resistance when you inject medicine or fluids into your catheter.     · Your catheter is out of place. This may happen after severe coughing or vomiting, or if you pull on the catheter.     · You have chest pain or shortness of breath.    Watch closely for changes in your health, and be sure to contact your doctor if:    · You have any concerns about your catheter. Where can you learn more? Go to http://jordy-michelle.info/. Enter D346 in the search box to learn more about \"Tunneled Catheter: What to Expect at Home. \"  Current as of: June 26, 2019  Content Version: 12.2  © 7810-3341 [employer]. Care instructions adapted under license by [employer] (which disclaims liability or warranty for this information). If you have questions about a medical condition or this instruction, always ask your healthcare professional. [address] any warranty or liability for your use of this information.

## 2020-05-11 NOTE — ROUTINE PROCESS
TRANSFER - OUT REPORT: 
 
Verbal report given to Melida(name) on Debbi Gall III  being transferred to [facility](unit) for routine progression of care Report consisted of patients Situation, Background, Assessment and  
Recommendations(SBAR). Information from the following report(s) SBAR, Procedure Summary and MAR was reviewed with the receiving nurse. Lines:  
Double Lumen Laura catheter 11/01/19 Right Subclavian (Active) Peripheral IV 10/30/19 Right Antecubital (Active) Site Assessment Clean, dry, & intact 11/1/2019  9:30 AM  
Phlebitis Assessment 0 11/1/2019  9:30 AM  
Infiltration Assessment 0 11/1/2019  9:30 AM  
Dressing Status Clean, dry, & intact 11/1/2019  9:30 AM  
Dressing Type Transparent 11/1/2019  9:30 AM  
Hub Color/Line Status Capped 11/1/2019  9:30 AM  
Action Taken Open ports on tubing capped 11/1/2019  9:30 AM  
Alcohol Cap Used Yes 11/1/2019  9:30 AM  
  
 
Opportunity for questions and clarification was provided. Patient transported with: 
 [employer]
no

## 2023-08-03 NOTE — PROGRESS NOTES
Bedside shift change report given to Fay Acevedo RN (oncoming nurse) HINA Greene(offgoing nurse). Report given with SBAR. Yes negative

## (undated) DEVICE — INFECTION CONTROL KIT SYS

## (undated) DEVICE — Z CONVERTED USE 2271043 CONTAINER SPEC COLL 4OZ SCR ON LID PEEL PCH

## (undated) DEVICE — Device

## (undated) DEVICE — TRAY CATH 16F URIN MTR LTX -- CONVERT TO ITEM 363111

## (undated) DEVICE — DRAIN KT WND 10FR RND 400ML --

## (undated) DEVICE — 3M™ IOBAN™ 2 ANTIMICROBIAL INCISE DRAPE 6651EZ: Brand: IOBAN™ 2

## (undated) DEVICE — HANDPIECE SET WITH BONE CLEANING TIP AND SUCTION TUBE: Brand: INTERPULSE

## (undated) DEVICE — SCRUB DRY SURG EZ SCRUB BRUSH PREOPERATIVE GRN

## (undated) DEVICE — DRAPE,EXTREMITY,89X128,STERILE: Brand: MEDLINE

## (undated) DEVICE — MIXING SYS CEM BNE VAC -- QUICKVAC 15/BX

## (undated) DEVICE — CONTAINER,SPECIMEN,3OZ,OR STRL: Brand: MEDLINE

## (undated) DEVICE — SWAB CULT DBL W/O CHAR RAYON TIP AMIES GEL CLMN FOR COLL

## (undated) DEVICE — NEEDLE HYPO 21GA L1.5IN INTRAMUSCULAR S STL LATCH BVL UP

## (undated) DEVICE — PADDING CST 6IN STERILE --

## (undated) DEVICE — SYRINGE MED 20ML STD CLR PLAS LUERLOCK TIP N CTRL DISP

## (undated) DEVICE — SOLUTION IRRIG 1000ML H2O STRL BLT

## (undated) DEVICE — ZIMMER® STERILE DISPOSABLE TOURNIQUET CUFF WITH PLC, DUAL PORT, SINGLE BLADDER, 34 IN. (86 CM)

## (undated) DEVICE — SPONGE LAP 18X18IN STRL -- 5/PK

## (undated) DEVICE — Z DISCONTINUED USE 2744636  DRESSING AQUACEL 14 IN ALG W3.5XL14IN POLYUR FLM CVR W/ HYDRCOLL

## (undated) DEVICE — PREP SKN PREVAIL 40ML APPL --

## (undated) DEVICE — T4 HOOD

## (undated) DEVICE — SLIM BODY SKIN STAPLER: Brand: APPOSE ULC

## (undated) DEVICE — STRAP,POSITIONING,KNEE/BODY,FOAM,4X60": Brand: MEDLINE

## (undated) DEVICE — BANDAGE COMPR M W6INXL10YD WHT BGE VELC E MTRX HK AND LOOP

## (undated) DEVICE — REM POLYHESIVE ADULT PATIENT RETURN ELECTRODE: Brand: VALLEYLAB

## (undated) DEVICE — SUTURE VCRL SZ 2-0 L36IN ABSRB UD L40MM CT 1/2 CIR J957H

## (undated) DEVICE — SUTURE VCRL SZ 0 L36IN ABSRB VLT L40MM CT 1/2 CIR J358H

## (undated) DEVICE — STERILE POLYISOPRENE POWDER-FREE SURGICAL GLOVES: Brand: PROTEXIS

## (undated) DEVICE — 4-PORT MANIFOLD: Brand: NEPTUNE 2

## (undated) DEVICE — ZIMMER® STERILE DISPOSABLE TOURNIQUET CUFF WITH PLC, DUAL PORT, SINGLE BLADDER, 24 IN. (61 CM)

## (undated) DEVICE — SUTURE VCRL SZ 2 L54IN ABSRB UD L65MM TP-1 1/2 CIR J880T

## (undated) DEVICE — 2108 SERIES SAGITTAL BLADE (18.6 X 0.8 X 73.8MM)

## (undated) DEVICE — SOLUTION IRRIG 3000ML 0.9% SOD CHL FLX CONT 0797208] ICU MEDICAL INC]

## (undated) DEVICE — DEVON™ KNEE AND BODY STRAP 60" X 3" (1.5 M X 7.6 CM): Brand: DEVON

## (undated) DEVICE — STERILE POLYISOPRENE POWDER-FREE SURGICAL GLOVES WITH EMOLLIENT COATING: Brand: PROTEXIS

## (undated) DEVICE — SOLUTION IV 50ML 0.9% SOD CHL

## (undated) DEVICE — PADDING CAST SPEC 6INX4YD COT --

## (undated) DEVICE — IMMOBILIZER KNEE CUTAWAY 24 IN

## (undated) DEVICE — NEEDLE HYPO 18GA L1.5IN PNK S STL HUB POLYPR SHLD REG BVL

## (undated) DEVICE — HOOK LOCK LATEX FREE ELASTIC BANDAGE D/L 6INX10YD

## (undated) DEVICE — 2108 SERIES SAGITTAL BLADE, NO OFFSET  (12.4 X 1.19 X 82.1MM)